# Patient Record
Sex: FEMALE | Race: WHITE | Employment: FULL TIME | URBAN - METROPOLITAN AREA
[De-identification: names, ages, dates, MRNs, and addresses within clinical notes are randomized per-mention and may not be internally consistent; named-entity substitution may affect disease eponyms.]

---

## 2019-09-06 ENCOUNTER — HOSPITAL ENCOUNTER (EMERGENCY)
Age: 34
Discharge: HOME | End: 2019-09-06
Attending: EMERGENCY MEDICINE
Payer: COMMERCIAL

## 2019-09-06 ENCOUNTER — APPOINTMENT (OUTPATIENT)
Dept: GENERAL RADIOLOGY | Age: 34
End: 2019-09-06
Attending: EMERGENCY MEDICINE
Payer: COMMERCIAL

## 2019-09-06 VITALS
HEART RATE: 72 BPM | RESPIRATION RATE: 16 BRPM | HEIGHT: 68 IN | OXYGEN SATURATION: 100 % | DIASTOLIC BLOOD PRESSURE: 58 MMHG | SYSTOLIC BLOOD PRESSURE: 102 MMHG | WEIGHT: 200 LBS | TEMPERATURE: 97.9 F | BODY MASS INDEX: 30.31 KG/M2

## 2019-09-06 DIAGNOSIS — M25.512 PAIN IN JOINT OF LEFT SHOULDER: Primary | ICD-10-CM

## 2019-09-06 PROCEDURE — 73030 X-RAY EXAM OF SHOULDER: CPT

## 2019-09-06 PROCEDURE — 99283 EMERGENCY DEPT VISIT LOW MDM: CPT | Performed by: NURSE PRACTITIONER

## 2019-09-06 PROCEDURE — 74011250637 HC RX REV CODE- 250/637: Performed by: NURSE PRACTITIONER

## 2019-09-06 RX ORDER — TRAMADOL HYDROCHLORIDE 50 MG/1
50 TABLET ORAL
Status: COMPLETED | OUTPATIENT
Start: 2019-09-06 | End: 2019-09-06

## 2019-09-06 RX ORDER — DICLOFENAC SODIUM 50 MG/1
50 TABLET, DELAYED RELEASE ORAL 2 TIMES DAILY
Qty: 20 TAB | Refills: 0 | Status: SHIPPED | OUTPATIENT
Start: 2019-09-06 | End: 2019-09-16

## 2019-09-06 RX ORDER — METHOCARBAMOL 750 MG/1
750 TABLET, FILM COATED ORAL 3 TIMES DAILY
Qty: 30 TAB | Refills: 0 | Status: SHIPPED | OUTPATIENT
Start: 2019-09-06 | End: 2019-09-16

## 2019-09-06 RX ORDER — DEXAMETHASONE SODIUM PHOSPHATE 100 MG/10ML
10 INJECTION INTRAMUSCULAR; INTRAVENOUS
Status: COMPLETED | OUTPATIENT
Start: 2019-09-06 | End: 2019-09-06

## 2019-09-06 RX ADMIN — TRAMADOL HYDROCHLORIDE 50 MG: 50 TABLET, FILM COATED ORAL at 12:07

## 2019-09-06 RX ADMIN — DEXAMETHASONE SODIUM PHOSPHATE 10 MG: 10 INJECTION INTRAMUSCULAR; INTRAVENOUS at 12:08

## 2019-09-06 NOTE — ED TRIAGE NOTES
Pt reports L shoulder hurts, states it is hurting her entire arm. Pt states she does a lot of repetitive movement at work and that she feels that is the cause.  Pain with ROM Scheduled tomorrow at 8:30

## 2019-09-06 NOTE — ED PROVIDER NOTES
Patient states left shoulder pain that radiates down her left arm. She states she has numbness in her left fingers. She states pain has been getting progressively worse over the past week. She states today while at work she started having pain with range of motion. She states she works at the [employer]. She denies known injury. She denies neck pain. The history is provided by the patient. No past medical history on file. No past surgical history on file. No family history on file. Social History     Socioeconomic History    Marital status:      Spouse name: Not on file    Number of children: Not on file    Years of education: Not on file    Highest education level: Not on file   Occupational History    Not on file   Social Needs    Financial resource strain: Not on file    Food insecurity:     Worry: Not on file     Inability: Not on file    Transportation needs:     Medical: Not on file     Non-medical: Not on file   Tobacco Use    Smoking status: Not on file   Substance and Sexual Activity    Alcohol use: Not on file    Drug use: Not on file    Sexual activity: Not on file   Lifestyle    Physical activity:     Days per week: Not on file     Minutes per session: Not on file    Stress: Not on file   Relationships    Social connections:     Talks on phone: Not on file     Gets together: Not on file     Attends Advent service: Not on file     Active member of club or organization: Not on file     Attends meetings of clubs or organizations: Not on file     Relationship status: Not on file    Intimate partner violence:     Fear of current or ex partner: Not on file     Emotionally abused: Not on file     Physically abused: Not on file     Forced sexual activity: Not on file   Other Topics Concern    Not on file   Social History Narrative    Not on file         ALLERGIES: Patient has no known allergies.     Review of Systems   Constitutional: Negative for chills and fever.   Cardiovascular: Negative for chest pain. Gastrointestinal: Negative for abdominal pain, nausea and vomiting. Musculoskeletal: Positive for arthralgias. Negative for back pain, joint swelling and neck pain. Neurological: Positive for numbness. Vitals:    09/06/19 1104   BP: 144/88   Pulse: 86   Resp: 16   Temp: 97.9 °F (36.6 °C)   SpO2: 98%   Weight: 90.7 kg (200 lb)   Height: 5' 8\" (1.727 m)            Physical Exam   Constitutional: She is oriented to person, place, and time. No distress. HENT:   Head: Normocephalic and atraumatic. Eyes: Conjunctivae and EOM are normal.   Neck: Normal range of motion. Neck supple. No spinous process tenderness and no muscular tenderness present. Cardiovascular: Normal rate and regular rhythm. Pulmonary/Chest: Effort normal and breath sounds normal.   Musculoskeletal:        Left shoulder: She exhibits decreased range of motion (due to pain), tenderness and pain. Left hand: She exhibits no tenderness, no bony tenderness, normal two-point discrimination and no swelling. Normal sensation noted. Normal strength noted. Neurological: She is alert and oriented to person, place, and time. Skin: Skin is warm and dry. She is not diaphoretic. Psychiatric: She has a normal mood and affect. Her behavior is normal.   Nursing note and vitals reviewed. Xr Shoulder Lt Ap/lat Min 2 V    Result Date: 9/6/2019  Left shoulder Clinical indications: Left shoulder pain for one week FINDINGS: Three views of the left shoulder show no fracture or dislocation. The joint spaces are well-maintained. The soft tissues are unremarkable. IMPRESSION: Negative left shoulder    MDM  Number of Diagnoses or Management Options  Pain in joint of left shoulder:   Diagnosis management comments: Xray negative for acute abnormalities.         Amount and/or Complexity of Data Reviewed  Tests in the radiology section of CPT®: ordered and reviewed  Tests in the medicine section of CPT®: ordered    Risk of Complications, Morbidity, and/or Mortality  Diagnostic procedures: low  Management options: low    Patient Progress  Patient progress: stable         Procedures

## 2019-09-06 NOTE — LETTER
[facility] EMERGENCY DEPT 
[address] [facility] 
[phone] Work/School Note Date: 9/6/2019 To Whom It May concern: 
 
Rojelio Zarco was seen and treated today in the emergency room by the following provider(s): 
Attending Provider: Salomón Maher DO 
Nurse Practitioner: SRIRAM Brown. Rojelio Zarco was seen in the emergency department 09/06/2019.  
 
Sincerely, 
 
 
 
 
SRIRAM Cameron

## 2019-09-06 NOTE — DISCHARGE INSTRUCTIONS
Medications as prescribed. Follow up with your primary care provider for a recheck if symptoms fail to improve or worsen. Return to the emergency department as needed.

## 2019-09-06 NOTE — ED NOTES
I have reviewed discharge instructions with the patient. The patient verbalized understanding. Patient left ED via Discharge Method: ambulatory to Home with self. Opportunity for questions and clarification provided. Patient given 2 scripts. To continue your aftercare when you leave the hospital, you may receive an automated call from our care team to check in on how you are doing. This is a free service and part of our promise to provide the best care and service to meet your aftercare needs.  If you have questions, or wish to unsubscribe from this service please call [phone]. Thank you for Choosing our [address] [place] [address] Emergency Department.

## 2022-05-27 ENCOUNTER — OFFICE VISIT (OUTPATIENT)
Dept: FAMILY MEDICINE CLINIC | Facility: CLINIC | Age: 37
End: 2022-05-27
Payer: COMMERCIAL

## 2022-05-27 VITALS
HEART RATE: 100 BPM | RESPIRATION RATE: 19 BRPM | WEIGHT: 211.4 LBS | BODY MASS INDEX: 32.04 KG/M2 | HEIGHT: 68 IN | SYSTOLIC BLOOD PRESSURE: 132 MMHG | OXYGEN SATURATION: 97 % | DIASTOLIC BLOOD PRESSURE: 70 MMHG

## 2022-05-27 DIAGNOSIS — R51.9 SINUS HEADACHE: ICD-10-CM

## 2022-05-27 DIAGNOSIS — J02.0 PHARYNGITIS DUE TO STREPTOCOCCUS SPECIES: ICD-10-CM

## 2022-05-27 DIAGNOSIS — J30.89 OTHER ALLERGIC RHINITIS: ICD-10-CM

## 2022-05-27 DIAGNOSIS — Z00.00 ROUTINE GENERAL MEDICAL EXAMINATION AT A HEALTH CARE FACILITY: Primary | ICD-10-CM

## 2022-05-27 DIAGNOSIS — F41.1 GENERALIZED ANXIETY DISORDER: ICD-10-CM

## 2022-05-27 DIAGNOSIS — G43.009 MIGRAINE WITHOUT AURA AND WITHOUT STATUS MIGRAINOSUS, NOT INTRACTABLE: ICD-10-CM

## 2022-05-27 DIAGNOSIS — E55.9 VITAMIN D DEFICIENCY: ICD-10-CM

## 2022-05-27 DIAGNOSIS — M54.6 CHRONIC MIDLINE THORACIC BACK PAIN: ICD-10-CM

## 2022-05-27 DIAGNOSIS — G89.29 CHRONIC MIDLINE THORACIC BACK PAIN: ICD-10-CM

## 2022-05-27 DIAGNOSIS — J32.9 RECURRENT SINUSITIS: ICD-10-CM

## 2022-05-27 PROBLEM — R07.0 THROAT PAIN: Status: ACTIVE | Noted: 2022-05-27

## 2022-05-27 PROBLEM — M54.9 BACK PAIN: Status: ACTIVE | Noted: 2022-05-27

## 2022-05-27 PROBLEM — J02.9 THROAT INFECTION: Status: ACTIVE | Noted: 2022-05-27

## 2022-05-27 LAB
GROUP A STREP ANTIGEN, POC: POSITIVE
VALID INTERNAL CONTROL, POC: YES

## 2022-05-27 PROCEDURE — 96372 THER/PROPH/DIAG INJ SC/IM: CPT | Performed by: NURSE PRACTITIONER

## 2022-05-27 PROCEDURE — 99214 OFFICE O/P EST MOD 30 MIN: CPT | Performed by: NURSE PRACTITIONER

## 2022-05-27 PROCEDURE — 99395 PREV VISIT EST AGE 18-39: CPT | Performed by: NURSE PRACTITIONER

## 2022-05-27 PROCEDURE — 87880 STREP A ASSAY W/OPTIC: CPT | Performed by: NURSE PRACTITIONER

## 2022-05-27 RX ORDER — TIZANIDINE 4 MG/1
4 TABLET ORAL
Qty: 30 TABLET | Refills: 2 | Status: SHIPPED | OUTPATIENT
Start: 2022-05-27

## 2022-05-27 RX ORDER — CEFDINIR 300 MG/1
300 CAPSULE ORAL 2 TIMES DAILY
Qty: 20 CAPSULE | Refills: 0 | Status: SHIPPED | OUTPATIENT
Start: 2022-05-27 | End: 2022-06-06

## 2022-05-27 RX ORDER — SUMATRIPTAN 50 MG/1
50 TABLET, FILM COATED ORAL
COMMUNITY
End: 2022-05-27

## 2022-05-27 RX ORDER — FAMOTIDINE 40 MG/1
TABLET, FILM COATED ORAL
COMMUNITY
Start: 2022-05-06 | End: 2022-05-27 | Stop reason: DRUGHIGH

## 2022-05-27 RX ORDER — ERGOCALCIFEROL 1.25 MG/1
CAPSULE ORAL
Qty: 5 CAPSULE | Refills: 2 | Status: SHIPPED | OUTPATIENT
Start: 2022-05-27 | End: 2022-08-19 | Stop reason: SDUPTHER

## 2022-05-27 RX ORDER — TRIAMCINOLONE ACETONIDE 40 MG/ML
40 INJECTION, SUSPENSION INTRA-ARTICULAR; INTRAMUSCULAR ONCE
Status: COMPLETED | OUTPATIENT
Start: 2022-05-27 | End: 2022-05-27

## 2022-05-27 RX ORDER — OMEPRAZOLE 40 MG/1
40 CAPSULE, DELAYED RELEASE ORAL DAILY
Qty: 30 CAPSULE | Refills: 5 | Status: SHIPPED | OUTPATIENT
Start: 2022-05-27

## 2022-05-27 RX ORDER — HYDROCODONE BITARTRATE AND ACETAMINOPHEN 5; 325 MG/1; MG/1
1 TABLET ORAL EVERY 6 HOURS PRN
Qty: 10 TABLET | Refills: 0 | Status: SHIPPED | OUTPATIENT
Start: 2022-05-27 | End: 2022-05-30

## 2022-05-27 RX ORDER — PREDNISONE 20 MG/1
TABLET ORAL
Qty: 11 TABLET | Refills: 0 | Status: SHIPPED | OUTPATIENT
Start: 2022-05-27 | End: 2022-06-13 | Stop reason: ALTCHOICE

## 2022-05-27 RX ORDER — KETOROLAC TROMETHAMINE 30 MG/ML
60 INJECTION, SOLUTION INTRAMUSCULAR; INTRAVENOUS ONCE
Status: COMPLETED | OUTPATIENT
Start: 2022-05-27 | End: 2022-05-27

## 2022-05-27 RX ORDER — AZELASTINE 1 MG/ML
SPRAY, METERED NASAL
COMMUNITY
Start: 2022-05-06

## 2022-05-27 RX ORDER — KETOROLAC TROMETHAMINE 10 MG/1
10 TABLET, FILM COATED ORAL EVERY 6 HOURS
COMMUNITY
Start: 2021-11-15 | End: 2022-05-27

## 2022-05-27 RX ORDER — ERGOCALCIFEROL 1.25 MG/1
CAPSULE ORAL
COMMUNITY
Start: 2022-04-03 | End: 2022-05-27 | Stop reason: SDUPTHER

## 2022-05-27 RX ADMIN — TRIAMCINOLONE ACETONIDE 40 MG: 40 INJECTION, SUSPENSION INTRA-ARTICULAR; INTRAMUSCULAR at 14:30

## 2022-05-27 RX ADMIN — KETOROLAC TROMETHAMINE 60 MG: 30 INJECTION, SOLUTION INTRAMUSCULAR; INTRAVENOUS at 14:30

## 2022-05-27 ASSESSMENT — ENCOUNTER SYMPTOMS
BLOOD IN STOOL: 0
ALLERGIC/IMMUNOLOGIC NEGATIVE: 1
SORE THROAT: 1
STRIDOR: 0
WHEEZING: 0
PHOTOPHOBIA: 0
CONSTIPATION: 0
BACK PAIN: 1
NAUSEA: 0
CHOKING: 0
RHINORRHEA: 0
ABDOMINAL DISTENTION: 0
VOMITING: 0
COUGH: 0
EYE ITCHING: 0
VOICE CHANGE: 0
ABDOMINAL PAIN: 0
RECTAL PAIN: 0
EYE PAIN: 0
ANAL BLEEDING: 0
EYE REDNESS: 0
SINUS PAIN: 1
CHEST TIGHTNESS: 0
EYE DISCHARGE: 0
APNEA: 0
SINUS PRESSURE: 1
GASTROINTESTINAL NEGATIVE: 1
TROUBLE SWALLOWING: 0
FACIAL SWELLING: 0
EYES NEGATIVE: 1
COLOR CHANGE: 0
RESPIRATORY NEGATIVE: 1
DIARRHEA: 0
SHORTNESS OF BREATH: 0

## 2022-05-27 NOTE — PROGRESS NOTES
PROGRESS NOTE    Chief Complaint   Patient presents with    Annual Exam     presenting for CPX and to discuss labs, migraines, depression and anxiety. States that since she has started the Prozac she has not had kayli horrible migraines. States that she got strep in october and has been sxic since even with abx. States that she has an appointment with ENT in 5 days. SUBJECTIVE:     Marquis Chaudhary is a very pleasant 30 y.o. female with hx of anxiety, migraine headache, seasonal allergies, vitamin D deficiency, GERD, back pain, and arthritis, seen today in office for her annual medical physical.  Patient continues to have sinus headache, pressure, bilateral ear fullness and pain with right worse than left. Patient also continues to have sore throat, painful swallowing. Patient has been treated for acute pharyngitis with strep coverage and has failed course of Keflex, Z-Fabiano, 2 courses of oral prednisone. Patient has been taking her Astelin and Zyrtec daily in addition to famotidine at bedtime for postnasal drip coverage in addition to reflux as cause. Patient has also been referred to ENT and has an appointment on 5/31 to establish care. Today, patient reports very painful swallowing with right ear pain in addition to having difficulty in sleeping the last couple nights due to discomfort. She reports no current fever or chills, no chest pain, no shortness of breath no palpitation, no unilateral focal weakness, no nausea, no vomiting, no diarrhea, no rash. Patient also has history of thoracic and lumbar back pain and arthritis. She has had more tightness and discomfort to her upper back recently. No recent trauma or falls or injuries. Past Medical History, Past Surgical History, Family history, Social History, and Medications were all reviewed with the patient today and updated as necessary.        Current Outpatient Medications   Medication Sig Dispense Refill    azelastine (ASTELIN) 0.1 % nasal spray 1 SPRAY BY BOTH NOSTRILS ROUTE TWO (2) TIMES A DAY. USE IN EACH NOSTRIL AS DIRECTED      vitamin D (ERGOCALCIFEROL) 1.25 MG (95067 UT) CAPS capsule TAKE 1 CAPSULE BY MOUTH EVERY SEVEN (7) DAYS. WITH FOOD 5 capsule 2    omeprazole (PRILOSEC) 40 MG delayed release capsule Take 1 capsule by mouth daily At bedtime for stomach/reflux 30 capsule 5    cefdinir (OMNICEF) 300 MG capsule Take 1 capsule by mouth 2 times daily for 10 days With food (antibiotic) 20 capsule 0    tiZANidine (ZANAFLEX) 4 MG tablet Take 1 tablet by mouth nightly as needed (Muscle Spasm) May cause drowsiness 30 tablet 2    predniSONE (DELTASONE) 20 MG tablet Take 2 tablets with food by mouth every morning for 4 days, then 1 tablet with food by mouth every morning for 3 days then stop. (start 5/28/22) 11 tablet 0    HYDROcodone-acetaminophen (NORCO) 5-325 MG per tablet Take 1 tablet by mouth every 6 hours as needed for Pain for up to 3 days. Intended supply: 3 days. Take lowest dose possible to manage pain 10 tablet 0    FLUoxetine (PROZAC) 10 MG capsule Take 10 mg by mouth daily      Norgestim-Eth Estrad Triphasic 0.18/0.215/0.25 MG-25 MCG TABS 1 tablet po qday per package instructions      Rimegepant Sulfate (NURTEC) 75 MG TBDP Take 75 mg by mouth daily as needed       No current facility-administered medications for this visit. Allergies   Allergen Reactions    Sertraline Anxiety     Worsening symptoms with jaw clenching     Patient Active Problem List   Diagnosis    Back pain    Throat pain    Throat infection     History reviewed. No pertinent past medical history.   Past Surgical History:   Procedure Laterality Date    CHOLECYSTECTOMY      GYN       Family History   Problem Relation Age of Onset    No Known Problems Mother     No Known Problems Father      Social History     Tobacco Use    Smoking status: Former Smoker    Smokeless tobacco: Never Used   Substance Use Topics    Alcohol use: Yes         REVIEW OF SYSTEM    Review of Systems   Constitutional: Negative. Negative for activity change, appetite change, chills, diaphoresis, fatigue, fever and unexpected weight change. HENT: Positive for congestion, ear pain, sinus pressure, sinus pain and sore throat. Negative for dental problem, drooling, ear discharge, facial swelling, hearing loss, mouth sores, nosebleeds, postnasal drip, rhinorrhea, sneezing, tinnitus, trouble swallowing and voice change. Eyes: Negative. Negative for photophobia, pain, discharge, redness, itching and visual disturbance. Respiratory: Negative. Negative for apnea, cough, choking, chest tightness, shortness of breath, wheezing and stridor. Cardiovascular: Negative. Negative for chest pain, palpitations and leg swelling. Gastrointestinal: Negative. Negative for abdominal distention, abdominal pain, anal bleeding, blood in stool, constipation, diarrhea, nausea, rectal pain and vomiting. Endocrine: Negative. Negative for cold intolerance, heat intolerance, polydipsia, polyphagia and polyuria. Genitourinary: Negative. Negative for decreased urine volume, difficulty urinating, dysuria, enuresis, flank pain, frequency, genital sores, hematuria and urgency. Musculoskeletal: Positive for arthralgias, back pain, myalgias and neck pain. Negative for gait problem, joint swelling and neck stiffness. Skin: Negative. Negative for color change, pallor, rash and wound. Allergic/Immunologic: Negative. Negative for environmental allergies, food allergies and immunocompromised state. Neurological: Positive for headaches (hx of migraines). Negative for dizziness, tremors, seizures, syncope, facial asymmetry, speech difficulty, weakness, light-headedness and numbness. Hematological: Negative. Negative for adenopathy. Does not bruise/bleed easily. Psychiatric/Behavioral: Positive for sleep disturbance.  Negative for agitation, behavioral problems, confusion, decreased concentration, dysphoric mood, hallucinations, self-injury and suicidal ideas. The patient is nervous/anxious (controlled). The patient is not hyperactive. OBJECTIVE:  /70 (Site: Left Upper Arm, Position: Sitting, Cuff Size: Medium Adult)   Pulse 100   Resp 19   Ht 5' 8\" (1.727 m)   Wt 211 lb 6.4 oz (95.9 kg)   SpO2 97%   BMI 32.14 kg/m²      Physical Exam  Constitutional:       General: She is not in acute distress. Appearance: Normal appearance. She is not ill-appearing, toxic-appearing or diaphoretic. HENT:      Head: Normocephalic and atraumatic. Right Ear: Ear canal and external ear normal. There is no impacted cerumen. Left Ear: Ear canal and external ear normal. There is no impacted cerumen. Ears:      Comments: Bilateral TM dull with effusion. No erythema, no rupture, no active drainage or discharge     Nose: No congestion or rhinorrhea. Comments: Positive for deviated septum     Mouth/Throat:      Mouth: Mucous membranes are moist.      Pharynx: Oropharynx is clear. Posterior oropharyngeal erythema present. Comments: Right tonsillar hypertrophy grade 2-3. No exudate noted  Eyes:      Extraocular Movements: Extraocular movements intact. Conjunctiva/sclera: Conjunctivae normal.      Pupils: Pupils are equal, round, and reactive to light. Neck:      Vascular: No carotid bruit. Cardiovascular:      Rate and Rhythm: Normal rate and regular rhythm. Pulses: Normal pulses. Heart sounds: Normal heart sounds. Pulmonary:      Effort: Pulmonary effort is normal. No respiratory distress. Breath sounds: Normal breath sounds. No stridor. No wheezing, rhonchi or rales. Chest:      Chest wall: No tenderness. Abdominal:      General: Abdomen is flat. Bowel sounds are normal. There is no distension. Palpations: Abdomen is soft. There is no shifting dullness, fluid wave, hepatomegaly, splenomegaly, mass or pulsatile mass.       Tenderness: There is no abdominal tenderness. There is no right CVA tenderness, left CVA tenderness, guarding or rebound. Negative signs include Silva's sign, Rovsing's sign, McBurney's sign, psoas sign and obturator sign. Hernia: No hernia is present. Musculoskeletal:         General: Tenderness (Left upper scapular region with tightness and spasm) present. Normal range of motion. Cervical back: Normal range of motion and neck supple. Tenderness present. No rigidity. Lymphadenopathy:      Cervical: Cervical adenopathy present. Skin:     General: Skin is warm and dry. Capillary Refill: Capillary refill takes less than 2 seconds. Neurological:      General: No focal deficit present. Mental Status: She is alert and oriented to person, place, and time. Psychiatric:         Mood and Affect: Mood normal.         Behavior: Behavior normal.         Thought Content: Thought content normal.         Judgment: Judgment normal.           Medical problems and test results were reviewed with the patient today. Lab Results   Component Value Date     05/19/2022    K 4.3 05/19/2022     05/19/2022    CO2 23 05/19/2022    BUN 9 05/19/2022    CREATININE 0.96 05/19/2022    GLUCOSE 74 05/19/2022    CALCIUM 9.7 05/19/2022    PROT 7.1 05/19/2022    LABALBU 4.3 05/19/2022    BILITOT 0.2 05/19/2022    ALKPHOS 67 05/19/2022    AST 12 05/19/2022    ALT 18 05/19/2022    GFRAA 98 10/27/2021    AGRATIO 1.5 05/19/2022       Lab Results   Component Value Date    TSH 2.160 05/19/2022               ASSESSMENT and PLAN    1. Routine general medical examination at a health care facility   Anticipatory guidance for age-seatbelts, avoid cell phone use in car (may use hands free), no texting while driving, avoid social drugs and tobacco, limit alcohol, fall safety, personal safety with appropriate use of helmets and equipment for protection, and appropriate use of sun screen and sun protection to prevent skin cancer. Encourage healthy diet and exercise daily. 2. Pharyngitis due to Streptococcus species  -     AMB POC RAPID STREP A  -     Strep A culture, throat; Future    Patient with reoccurring strep. Patient completed course of Keflex and prednisone last month with improvement while on antibiotics but symptoms returned as soon as medication are completed. Patient also completed a course of Z-Fabiano a couple weeks prior and low-dose prednisone with minimal improvement. Today her posterior pharynx showed right tonsillar hypertrophy with erythema and inflammation. Cervical adenopathy enlargement and tenderness noted. POC strep is positive. Throat culture obtained and sent for sensitivity. Will have patient start cefdinir twice daily for 10 days. Reviewed risk and side effects of medication including GI upset and increased risk for opportunistic infection such as yeast and C. difficile. Patient was advised to take medication with food and to increase probiotic supplementation (via yogurt, cottage cheese, cultured foods/drinks or OTC probiotic supplements) to avoid any opportunistic infection. Due to reoccurring strep and recurrent sinusitis/headache/deviated septum, referral has been placed to ENT and patient has an appointment on 5/21 to establish care. 3. Recurrent sinusitis  -     cefdinir (OMNICEF) 300 MG capsule; Take 1 capsule by mouth 2 times daily for 10 days With food (antibiotic), Disp-20 capsule, R-0Normal  -     predniSONE (DELTASONE) 20 MG tablet; Take 2 tablets with food by mouth every morning for 4 days, then 1 tablet with food by mouth every morning for 3 days then stop. (start 5/28/22), Disp-11 tablet, R-0Normal  -     CT SINUS W WO CONTRAST; Future  -     CT FACIAL BONES W CONTRAST; Future    As above. Due to level of discomfort and pain, administered Kenalog 40 mg IM in office along with Toradol 60 mg. We will have patient start prednisone course orally tomorrow.   Reviewed risks and side effects of medication including increased GI upset, increased risks for cardiac disease, flushing, hyperactivity, irritability, agitation, insomnia. Patient was advised to take medication with food and to take early in the day. Pt was also advised to stay hydrated. CT of sinus/facial bones ordered for evaluation. Patient to keep her appointment with ENT on 5/31 to establish care and further work-up. 4. Sinus headache  -     ketorolac (TORADOL) injection 60 mg; 60 mg, IntraMUSCular, ONCE, 1 dose, On Fri 5/27/22 at 1515Do not administer for more than 5 days  -     predniSONE (DELTASONE) 20 MG tablet; Take 2 tablets with food by mouth every morning for 4 days, then 1 tablet with food by mouth every morning for 3 days then stop. (start 5/28/22), Disp-11 tablet, R-0Normal  -     CT SINUS W WO CONTRAST; Future  -     CT FACIAL BONES W CONTRAST; Future    As above. 5. Other allergic rhinitis   As above. Patient was advised to continue with Astelin but add in Flonase with 1 spray from each nasal spray per nostril once in the morning and once at bedtime. Continue with Zyrtec. We will continue to hold Singulair at this time due to her history of anxiety. 6. Chronic midline thoracic back pain  -     ketorolac (TORADOL) injection 60 mg; 60 mg, IntraMUSCular, ONCE, 1 dose, On Fri 5/27/22 at 1515Do not administer for more than 5 days  -     tiZANidine (ZANAFLEX) 4 MG tablet; Take 1 tablet by mouth nightly as needed (Muscle Spasm) May cause drowsiness, Disp-30 tablet, R-2Normal  -     predniSONE (DELTASONE) 20 MG tablet; Take 2 tablets with food by mouth every morning for 4 days, then 1 tablet with food by mouth every morning for 3 days then stop. (start 5/28/22), Disp-11 tablet, R-0Normal  -     HYDROcodone-acetaminophen (NORCO) 5-325 MG per tablet; Take 1 tablet by mouth every 6 hours as needed for Pain for up to 3 days. Intended supply: 3 days.  Take lowest dose possible to manage pain, Disp-10 tablet, R-0Normal    Due to level of pain and discomfort to her upper back, throat, administer Kenalog 40 mg IM and Toradol 60 mg IM today in office for relief. Patient can continue with Tylenol as needed OTC for inflammation and pain. Advised patient to avoid taking ibuprofen and other oral NSAID when taking prednisone due to increased risk of GI bleed. Norco as needed for severe pain. Review risk and side effects of medication including drowsiness, dizziness, increased risk of constipation, increased risk of injury. Patient was advised not to take medication and drive or operate machinery due to increased risk of injury. Patient was also advised to exercise caution when getting up after taking medication to avoid falls. Patient was also advised to not take Norco concurrently with tizanidine due to similar sedation risk and increased risk of overdose/injuries. 7. Vitamin D deficiency  -     vitamin D (ERGOCALCIFEROL) 1.25 MG (61307 UT) CAPS capsule; TAKE 1 CAPSULE BY MOUTH EVERY SEVEN (7) DAYS. WITH FOOD, Disp-5 capsule, R-2Normal    Vitamin D level low. We will have patient restart 50,000 international units of ergocalciferol once weekly with food. We will recheck level in 3 months. 8. Migraine without aura and without status migrainosus, not intractable   Stable. Continue current therapy. 9. Generalized anxiety disorder   Will have patient continue with Prozac 10 mg once daily at this time. We will follow-up in 2 to 3 weeks. If no improvement, we will increase dose.       Orders Placed This Encounter   Procedures    Strep A culture, throat     Standing Status:   Future     Standing Expiration Date:   5/27/2023    CT SINUS W WO CONTRAST     Standing Status:   Future     Standing Expiration Date:   5/27/2023     Order Specific Question:   STAT Creatinine as needed:     Answer:   No     Order Specific Question:   Reason for exam:     Answer:   recurrent sinusitis, headache,    CT FACIAL BONES W CONTRAST     Standing Status:   Future     Standing Expiration Date:   5/27/2023     Order Specific Question:   STAT Creatinine as needed:     Answer:   No    AMB POC RAPID STREP A         Elements of this note have been dictated using speech recognition software. As a result, errors of speech recognition may have occurred. On this date 5/27/2022 I have spent 40 minutes reviewing previous notes, test results and face to face with the patient discussing the diagnosis and importance of compliance with the treatment plan as well as documenting on the day of the visit. Greater than 50% of this visit was spent counseling the patient about test results, prognosis, importance of compliance, education about disease process, benefits of medications, instructions for management of acute symptoms, and follow up plans.          Brenda Nava, APRN - CNP

## 2022-05-29 LAB
BACTERIA SPEC CULT: ABNORMAL
SERVICE CMNT-IMP: ABNORMAL

## 2022-06-02 ENCOUNTER — HOSPITAL ENCOUNTER (OUTPATIENT)
Dept: CT IMAGING | Age: 37
Discharge: HOME | End: 2022-06-05
Payer: COMMERCIAL

## 2022-06-02 DIAGNOSIS — R51.9 SINUS HEADACHE: ICD-10-CM

## 2022-06-02 DIAGNOSIS — J32.9 RECURRENT SINUSITIS: ICD-10-CM

## 2022-06-02 PROCEDURE — 70486 CT MAXILLOFACIAL W/O DYE: CPT

## 2022-06-13 ENCOUNTER — TELEMEDICINE (OUTPATIENT)
Dept: FAMILY MEDICINE CLINIC | Facility: CLINIC | Age: 37
End: 2022-06-13
Payer: COMMERCIAL

## 2022-06-13 DIAGNOSIS — R09.81 NASAL CONGESTION: ICD-10-CM

## 2022-06-13 DIAGNOSIS — J02.9 SORE THROAT: ICD-10-CM

## 2022-06-13 DIAGNOSIS — J02.0 ACUTE STREPTOCOCCAL PHARYNGITIS: ICD-10-CM

## 2022-06-13 DIAGNOSIS — J01.01 ACUTE RECURRENT MAXILLARY SINUSITIS: Primary | ICD-10-CM

## 2022-06-13 DIAGNOSIS — R09.82 POST-NASAL DRIP: ICD-10-CM

## 2022-06-13 DIAGNOSIS — J34.89 SINUS PRESSURE: ICD-10-CM

## 2022-06-13 PROCEDURE — 99213 OFFICE O/P EST LOW 20 MIN: CPT | Performed by: NURSE PRACTITIONER

## 2022-06-13 RX ORDER — AMOXICILLIN AND CLAVULANATE POTASSIUM 875; 125 MG/1; MG/1
1 TABLET, FILM COATED ORAL 2 TIMES DAILY
Qty: 20 TABLET | Refills: 0 | Status: SHIPPED | OUTPATIENT
Start: 2022-06-13 | End: 2022-06-23

## 2022-06-13 ASSESSMENT — ENCOUNTER SYMPTOMS
EYE PAIN: 0
RHINORRHEA: 1
TROUBLE SWALLOWING: 0
RECTAL PAIN: 0
DIARRHEA: 0
RESPIRATORY NEGATIVE: 1
ABDOMINAL DISTENTION: 0
ANAL BLEEDING: 0
BACK PAIN: 0
CHEST TIGHTNESS: 0
SORE THROAT: 1
EYES NEGATIVE: 1
GASTROINTESTINAL NEGATIVE: 1
VOICE CHANGE: 0
SHORTNESS OF BREATH: 0
STRIDOR: 0
ABDOMINAL PAIN: 0
EYE DISCHARGE: 0
VOMITING: 0
SINUS PRESSURE: 1
BLOOD IN STOOL: 0
WHEEZING: 0
FACIAL SWELLING: 0
NAUSEA: 0
ALLERGIC/IMMUNOLOGIC NEGATIVE: 1
COUGH: 0
CONSTIPATION: 0
SINUS PAIN: 1

## 2022-06-13 NOTE — PROGRESS NOTES
[address] [facility] 109 [address] [facility]  Phone: [phone] Fax [phone]  Bright Friedman MS, APRN, FNP-C  6/13/2022    Louie Stevens is a 30 y.o. female who was seen by synchronous (real-time) audio-video technology on 6/13/2022 for Pharyngitis (Pt was seen by her PCP-Dixie Ramirez NP on 5/27/22 for acute strep pharyngitis and acute recurrent maxillary sinusitis. Pt was treated with Cefdinir 300 mg po bid for 10 days, Prednisone taper, and was continued on her Astelin, Flonase, and Zyrtec. Pt reports improving some, but has had a continued sore throat and reports continued nasal congestion with pale yellow rhinorrhea, continued bilat maxillary sinus pressure and pain, and continued post nasal drip. ) and Sinusitis (Pt denies any fever, chills, body aches, malaise, SOB, wheezing, CP, cough, N/V/D/abd pain associated. Pt had CT of her sinuses 6/2/22-result posted in lab section of note. Pt states that she sees her ENT this Thursday, 6/16/22. )        Assessment & Plan:     1. Acute recurrent maxillary sinusitis  2. Acute streptococcal pharyngitis  Pt was seen by her PCP-Dixie Ramirez NP on 5/27/22 for acute strep pharyngitis and acute recurrent maxillary sinusitis. Pt was treated with Cefdinir 300 mg po bid for 10 days, Prednisone taper, and was continued on her Astelin, Flonase, and Zyrtec. Pt reports improving some, but has had a continued sore throat and reports continued nasal congestion with pale yellow rhinorrhea, continued bilat maxillary sinus pressure and pain, and continued post nasal drip. Pt denies any fever, chills, body aches, malaise, SOB, wheezing, CP, cough, N/V/D/abd pain associated. Pt had CT of her sinuses 6/2/22-result posted in lab section of note. Pt states that she sees her ENT this Thursday, 6/16/22. Discussed with pt. Pt improved some, but have some persistent symptoms of sore throat/sinusitis. Will cover with additional abx. Checked allergies.  Will cover with Augmentin 875 mg po bid with food for 10 days. Will treat other symptoms as below. Pt encouraged to keep appointment with her ENT Thursday 6/16/22 as scheduled. ENT can decide if pt should continue for the full 10 days of additional Augmentin based on her physical exam findings in office. Pt to f/u with her PCP-Dixie Fonseca NP in 4 weeks to discuss findings of ENT. Agrees to call sooner for concerns/new or worsening symptoms. Agrees to go to ER for severe symptoms as discussed. - amoxicillin-clavulanate (AUGMENTIN) 875-125 MG per tablet; Take 1 tablet by mouth 2 times daily for 10 days  Dispense: 20 tablet; Refill: 0    3. Nasal congestion  4. Sinus pressure  5. Post-nasal drip  6. Sore throat  Will have pt continue her Astelin, Flonase, and Zyrtec. Recommend warm salt water gargles as well. Return in about 4 weeks (around 7/11/2022) for With Naty Brady to discuss findings of ENT. Call sooner for concerns. ER for severe symptoms. Raymundo Reis [phone]  Subjective:       Prior to Admission medications    Medication Sig Start Date End Date Taking? Authorizing Provider   amoxicillin-clavulanate (AUGMENTIN) 875-125 MG per tablet Take 1 tablet by mouth 2 times daily for 10 days 6/13/22 6/23/22 Yes TIARA Weller - NP   azelastine (ASTELIN) 0.1 % nasal spray 1 SPRAY BY BOTH NOSTRILS ROUTE TWO (2) TIMES A DAY. USE IN EACH NOSTRIL AS DIRECTED 5/6/22  Yes Historical Provider, MD   vitamin D (ERGOCALCIFEROL) 1.25 MG (38806 UT) CAPS capsule TAKE 1 CAPSULE BY MOUTH EVERY SEVEN (7) DAYS.  WITH FOOD 5/27/22  Yes TIARA Cisneros CNP   omeprazole (PRILOSEC) 40 MG delayed release capsule Take 1 capsule by mouth daily At bedtime for stomach/reflux 5/27/22  Yes TIARA Cisneros CNP   tiZANidine (ZANAFLEX) 4 MG tablet Take 1 tablet by mouth nightly as needed (Muscle Spasm) May cause drowsiness 5/27/22  Yes TIARA Sheppard CNP   FLUoxetine (PROZAC) 10 MG capsule Take 10 mg by mouth daily 4/25/22  Yes Ar Automatic Reconciliation   Norgestim-Eth Estrad Triphasic 0.18/0.215/0.25 MG-25 MCG TABS 1 tablet po qday per package instructions 9/11/19  Yes Ar Automatic Reconciliation   Rimegepant Sulfate (NURTEC) 75 MG TBDP Take 75 mg by mouth daily as needed 3/23/22  Yes Ar Automatic Reconciliation       Allergies   Allergen Reactions    Sertraline Anxiety     Worsening symptoms with jaw clenching   , History reviewed. No pertinent past medical history. ,   Past Surgical History:   Procedure Laterality Date    CHOLECYSTECTOMY      GYN     ,   Social History     Tobacco Use    Smoking status: Former Smoker    Smokeless tobacco: Never Used   Substance Use Topics    Alcohol use: Yes    Drug use: Never   ,   Family History   Problem Relation Age of Onset    No Known Problems Mother     No Known Problems Father          Review of Systems   Constitutional: Negative. Negative for appetite change, chills, diaphoresis, fatigue, fever and unexpected weight change. HENT: Positive for congestion (with pale yellow d/c), postnasal drip, rhinorrhea (pale yellow), sinus pressure (bilat maxillary sinus pressure/pain), sinus pain (bilat maxillary sinus pressure/pain) and sore throat. Negative for ear discharge, ear pain, facial swelling, hearing loss, mouth sores, nosebleeds, sneezing, tinnitus, trouble swallowing and voice change. Eyes: Negative. Negative for pain, discharge and visual disturbance. Respiratory: Negative. Negative for cough, chest tightness, shortness of breath, wheezing and stridor. Cardiovascular: Negative. Negative for chest pain, palpitations and leg swelling. Gastrointestinal: Negative. Negative for abdominal distention, abdominal pain, anal bleeding, blood in stool, constipation, diarrhea, nausea, rectal pain and vomiting. Endocrine: Negative. Genitourinary: Negative.   Negative for decreased urine volume, difficulty urinating, dyspareunia, dysuria, flank pain, frequency, genital sores, hematuria, menstrual problem, pelvic pain, urgency, vaginal bleeding, vaginal discharge and vaginal pain. Musculoskeletal: Negative. Negative for arthralgias, back pain, gait problem, joint swelling, myalgias, neck pain and neck stiffness. Skin: Negative. Negative for pallor and rash. Allergic/Immunologic: Negative. Negative for environmental allergies. Neurological: Negative. Negative for dizziness, tremors, syncope, weakness, light-headedness, numbness and headaches. Hematological: Negative. Negative for adenopathy. Does not bruise/bleed easily. Physical Exam  Constitutional:       General: She is not in acute distress. Appearance: Normal appearance. She is not ill-appearing, toxic-appearing or diaphoretic. HENT:      Head: Normocephalic and atraumatic. Pulmonary:      Effort: Pulmonary effort is normal. No respiratory distress. Comments: No audible wheezing  Neurological:      Mental Status: She is alert and oriented to person, place, and time. Psychiatric:         Mood and Affect: Mood normal.         Behavior: Behavior normal.         Thought Content:  Thought content normal.         Judgment: Judgment normal.       Component      Latest Ref Rng & Units 5/27/2022           7:35 PM   Special Requests       NO SPECIAL REQUESTS   Culture       HEAVY Streptococcus pyogenes sero group A (A)     Component      Latest Ref Rng & Units 5/27/2022           2:04 PM   VALID INTERNAL CONTROL, POC       yes   Group A Strep Antigen, POC      Negative Positive     Component      Latest Ref Rng & Units 5/19/2022          11:03 AM   GLUCOSE, FASTING,GF      65 - 99 mg/dL 74   BUN,BUNPL      6 - 20 mg/dL 9   Creatinine      0.57 - 1.00 mg/dL 0.96   EGFR      >59 mL/min/1.73 79   Bun/Cre Ratio      9 - 23 NA 9   Sodium      134 - 144 mmol/L 139   Potassium      3.5 - 5.2 mmol/L 4.3   Chloride      96 - 106 mmol/L 100   CO2      20 - 29 mmol/L 23   CALCIUM, SERUM, 439662      8.7 - 10.2 mg/dL 9. 7   Total Protein      6.0 - 8.5 g/dL 7.1   Albumin      3.8 - 4.8 g/dL 4.3   Globulin, Total      1.5 - 4.5 g/dL 2.8   Albumin/Globulin Ratio      1.2 - 2.2 NA 1.5   Bilirubin      0.0 - 1.2 mg/dL 0.2   Alk Phos      44 - 121 IU/L 67   AST      0 - 40 IU/L 12   ALT      0 - 32 IU/L 18     22  Narrative   CT PARANASAL SINUSES WITHOUT CONTRAST.       INDICATION: Chronic sinusitis, more on the right, worsening.       TECHNIQUE: Axial 2.5 mm scans with coronal reconstructions.  Radiation dose   reduction techniques were used for this study.  Our CT scanners use one or more   of the following:  Automated exposure control, adjustment of the mA and or kV   according to patient size, iterative reconstruction.       COMPARISON: None.       FINDINGS:    FRONTAL SINUSES: Small and clear without mucosal thickening or fluid.         ETHMOID SINUSES: Clear both anteriorly and posteriorly.         SPHENOID SINUSES: Also clear without mucosal thickening or fluid.         MAXILLARY SINUSES / OSTEOMEATAL UNITS:   RIGHT: No mucosal thickening or fluid. Patent infundibulum and thin uncinate   process. LEFT:  Mucous retention cyst posterior floor. Patent infundibulum and thin   uncinate process. No free fluid.       TURBINATES: Small left muna bullosa.         NASAL SEPTUM: Deviated and a leftward transverse nasal septal spur.       MASTOID air cells: Included portion clear.       Included intracranial contents: Unremarkable.  Globes and orbits: Also   unremarkable.           Impression   Mucous retention cyst left maxillary sinus. No free fluid. Nasal   septal deviation. Small muna bullosa on the left. Zak Seay (: 1985) is a 30 y.o. female, Established patient patient, seen via A/V VV for evaluation of the following chief complaint(s):  Pharyngitis (Pt was seen by her PCP-Kim Candance Long, NP on 22 for acute strep pharyngitis and acute recurrent maxillary sinusitis.  Pt was treated with Cefdinir 300 mg po bid for 10 days, Prednisone taper, and was continued on her Astelin, Flonase, and Zyrtec. Pt reports improving some, but has had a continued sore throat and reports continued nasal congestion with pale yellow rhinorrhea, continued bilat maxillary sinus pressure and pain, and continued post nasal drip. ) and Sinusitis (Pt denies any fever, chills, body aches, malaise, SOB, wheezing, CP, cough, N/V/D/abd pain associated. Pt had CT of her sinuses 6/2/22-result posted in lab section of note. Pt states that she sees her ENT this Thursday, 6/16/22. ) LMP 4 weeks ago per pt. Denies chance of pregnancy. Reports menses due any day. We discussed the expected course, resolution and complications of the diagnosis(es) in detail. Medication risks, benefits, costs, interactions, and alternatives were discussed as indicated. I advised her to contact the office if her condition worsens, changes or fails to improve as anticipated. She expressed understanding with the diagnosis(es) and plan. Silvio Favre, was evaluated through a synchronous (real-time) audio-video encounter. The patient (or guardian if applicable) is aware that this is a billable service. Verbal consent to proceed has been obtained within the past 12 months. The visit was conducted pursuant to the emergency declaration under the [facility], [address] [place] [address] authority and the [employer] General Act. Patient identification was verified, and a caregiver was present when appropriate. The patient was located in a state where the provider was credentialed to provide care. Silvio Favre, was evaluated through a synchronous (real-time) audio-video encounter. The patient (or guardian if applicable) is aware that this is a billable service, which includes applicable co-pays. This Virtual Visit was conducted with patient's (and/or legal guardian's) consent.  The visit was conducted pursuant to the emergency declaration under the [facility], [facility] authority and the [employer] General Act. Patient identification was verified, and a caregiver was present when appropriate. The patient was located at Other: work. Provider was located at [facility] ([address] [place], [place] [address]t): [address] Pankaj Galvan [address],  [address]    TIARA Polo NP

## 2022-06-13 NOTE — PATIENT INSTRUCTIONS
Patient Education      Patient Education        Sore Throat: Care Instructions  Overview     Infection by bacteria or a virus causes most sore throats. Cigarette smoke, dry air, air pollution, allergies, and yelling can also cause a sore throat. Sore throats can be painful and annoying. Fortunately, most sore throats go away on their own. If you have a bacterial infection, your doctor may prescribeantibiotics. Follow-up care is a key part of your treatment and safety. Be sure to make and go to all appointments, and call your doctor if you are having problems. It's also a good idea to know your test results and keep alist of the medicines you take. How can you care for yourself at home?  If your doctor prescribed antibiotics, take them as directed. Do not stop taking them just because you feel better. You need to take the full course of antibiotics.  Gargle with warm salt water several times a day to help reduce swelling and relieve pain. Mix 1/2 teaspoon of salt in 1 cup of warm water.  Take an over-the-counter pain medicine, such as acetaminophen (Tylenol), ibuprofen (Advil, Motrin), or naproxen (Aleve). Read and follow all instructions on the label.  Be careful when taking over-the-counter cold or flu medicines and Tylenol at the same time. Many of these medicines have acetaminophen, which is Tylenol. Read the labels to make sure that you are not taking more than the recommended dose. Too much acetaminophen (Tylenol) can be harmful.  Drink plenty of fluids. Fluids may help soothe an irritated throat. Hot fluids, such as tea or soup, may help decrease throat pain.  Use over-the-counter throat lozenges to soothe pain. Regular cough drops or hard candy may also help. These should not be given to young children because of the risk of choking.  Do not smoke or allow others to smoke around you. If you need help quitting, talk to your doctor about stop-smoking programs and medicines.  These can increase your chances of quitting for good.  Use a vaporizer or humidifier to add moisture to your bedroom. Follow the directions for cleaning the machine. When should you call for help? Call your doctor now or seek immediate medical care if:     You have trouble breathing.      Your sore throat gets much worse on one side.      You have new or worse trouble swallowing.      You have a new or higher fever. Watch closely for changes in your health, and be sure to contact your doctor ifyou do not get better as expected. Where can you learn more? Go to https://Alo7peMeasurefuleb.[employer]. org and sign in to your [employer] account. Enter F440 in the [employer] box to learn more about \"Sore Throat: Care Instructions. \"     If you do not have an account, please click on the \"Sign Up Now\" link. Current as of: September 8, 2021               Content Version: 13.2  © 2006-2022 [employer]. Care instructions adapted under license by [facility] ([facility]). If you have questions about a medical condition or this instruction, always ask your healthcare professional. [place] [address] any warranty or liability for your use of this information. Sinusitis: Care Instructions  Your Care Instructions     Sinusitis is an infection of the lining of the sinus cavities in your head. Sinusitis often follows a cold. It causes pain and pressure in your head andface. In most cases, sinusitis gets better on its own in 1 to 2 weeks. But some mildsymptoms may last for several weeks. Sometimes antibiotics are needed. Follow-up care is a key part of your treatment and safety. Be sure to make and go to all appointments, and call your doctor if you are having problems. It's also a good idea to know your test results and keep alist of the medicines you take. How can you care for yourself at home?    Take an over-the-counter pain medicine, such as acetaminophen (Tylenol), ibuprofen (Advil, Motrin), or naproxen (Aleve). Read and follow all instructions on the label.  If the doctor prescribed antibiotics, take them as directed. Do not stop taking them just because you feel better. You need to take the full course of antibiotics.  Be careful when taking over-the-counter cold or flu medicines and Tylenol at the same time. Many of these medicines have acetaminophen, which is Tylenol. Read the labels to make sure that you are not taking more than the recommended dose. Too much acetaminophen (Tylenol) can be harmful.  Breathe warm, moist air from a steamy shower, a hot bath, or a sink filled with hot water. Avoid cold, dry air. Using a humidifier in your home may help. Follow the directions for cleaning the machine.  Use saline (saltwater) nasal washes. This can help keep your nasal passages open and wash out mucus and bacteria. You can buy saline nose drops at a grocery store or drugstore. Or you can make your own at home by adding 1 teaspoon of salt and 1 teaspoon of baking soda to 2 cups of distilled water. If you make your own, fill a bulb syringe with the solution, insert the tip into your nostril, and squeeze gently. Margueritte Gash your nose.  Put a hot, wet towel or a warm gel pack on your face 3 or 4 times a day for 5 to 10 minutes each time.  Try a decongestant nasal spray like oxymetazoline (Afrin). Do not use it for more than 3 days in a row. Using it for more than 3 days can make your congestion worse. When should you call for help? Call your doctor now or seek immediate medical care if:     You have new or worse swelling or redness in your face or around your eyes.      You have a new or higher fever. Watch closely for changes in your health, and be sure to contact your doctor if:     You have new or worse facial pain.      The mucus from your nose becomes thicker (like pus) or has new blood in it.      You are not getting better as expected. Where can you learn more?   Go to https://chpepiceweb.health[employer]. org and sign in to your [employer] account. Enter U475 in the [facility] box to learn more about \"Sinusitis: Care Instructions. \"     If you do not have an account, please click on the \"Sign Up Now\" link. Current as of: September 8, 2021               Content Version: 13.2  © 0332-4633 [employer], [facility]. Care instructions adapted under license by [facility] ([facility]). If you have questions about a medical condition or this instruction, always ask your healthcare professional. [place] [address] any warranty or liability for your use of this information.

## 2022-07-13 RX ORDER — DROSPIRENONE 4 MG/1
1 TABLET, FILM COATED ORAL EVERY MORNING
COMMUNITY
Start: 2022-06-20

## 2022-07-15 ENCOUNTER — OFFICE VISIT (OUTPATIENT)
Dept: FAMILY MEDICINE CLINIC | Facility: CLINIC | Age: 37
End: 2022-07-15
Payer: COMMERCIAL

## 2022-07-15 VITALS
SYSTOLIC BLOOD PRESSURE: 118 MMHG | WEIGHT: 213 LBS | HEIGHT: 68 IN | OXYGEN SATURATION: 98 % | BODY MASS INDEX: 32.28 KG/M2 | DIASTOLIC BLOOD PRESSURE: 72 MMHG | HEART RATE: 82 BPM

## 2022-07-15 DIAGNOSIS — J32.9 RECURRENT SINUSITIS: ICD-10-CM

## 2022-07-15 DIAGNOSIS — F41.1 GENERALIZED ANXIETY DISORDER: ICD-10-CM

## 2022-07-15 DIAGNOSIS — R09.82 POST-NASAL DRIP: ICD-10-CM

## 2022-07-15 DIAGNOSIS — J34.89 SINUS PRESSURE: ICD-10-CM

## 2022-07-15 DIAGNOSIS — J02.9 SORE THROAT: Primary | ICD-10-CM

## 2022-07-15 LAB
GROUP A STREP ANTIGEN, POC: NEGATIVE
VALID INTERNAL CONTROL, POC: YES

## 2022-07-15 PROCEDURE — 87880 STREP A ASSAY W/OPTIC: CPT | Performed by: NURSE PRACTITIONER

## 2022-07-15 PROCEDURE — 99214 OFFICE O/P EST MOD 30 MIN: CPT | Performed by: NURSE PRACTITIONER

## 2022-07-15 RX ORDER — BUPROPION HYDROCHLORIDE 150 MG/1
150 TABLET ORAL EVERY MORNING
Qty: 30 TABLET | Refills: 3 | Status: SHIPPED | OUTPATIENT
Start: 2022-07-15 | End: 2022-08-23 | Stop reason: SDUPTHER

## 2022-07-15 RX ORDER — MONTELUKAST SODIUM 10 MG/1
10 TABLET ORAL NIGHTLY
Qty: 30 TABLET | Refills: 5 | Status: SHIPPED | OUTPATIENT
Start: 2022-07-15 | End: 2022-07-18 | Stop reason: SDUPTHER

## 2022-07-15 ASSESSMENT — PATIENT HEALTH QUESTIONNAIRE - PHQ9
SUM OF ALL RESPONSES TO PHQ QUESTIONS 1-9: 0
2. FEELING DOWN, DEPRESSED OR HOPELESS: 0
SUM OF ALL RESPONSES TO PHQ9 QUESTIONS 1 & 2: 0
1. LITTLE INTEREST OR PLEASURE IN DOING THINGS: 0
SUM OF ALL RESPONSES TO PHQ QUESTIONS 1-9: 0

## 2022-07-17 ENCOUNTER — TELEPHONE (OUTPATIENT)
Dept: FAMILY MEDICINE CLINIC | Facility: CLINIC | Age: 37
End: 2022-07-17

## 2022-07-17 ASSESSMENT — ENCOUNTER SYMPTOMS
CONSTIPATION: 0
RECTAL PAIN: 0
ABDOMINAL PAIN: 0
STRIDOR: 0
ANAL BLEEDING: 0
NAUSEA: 0
VOMITING: 0
BLOOD IN STOOL: 0
COLOR CHANGE: 0
DIARRHEA: 0
WHEEZING: 0
VOICE CHANGE: 0
PHOTOPHOBIA: 0
APNEA: 0
ABDOMINAL DISTENTION: 0
ALLERGIC/IMMUNOLOGIC NEGATIVE: 1
EYE PAIN: 0
SINUS PRESSURE: 0
CHEST TIGHTNESS: 0
SINUS PAIN: 1
EYES NEGATIVE: 1
RHINORRHEA: 0
EYE REDNESS: 0
SHORTNESS OF BREATH: 0
FACIAL SWELLING: 0
SORE THROAT: 0
COUGH: 1
CHOKING: 0
EYE DISCHARGE: 0
GASTROINTESTINAL NEGATIVE: 1
EYE ITCHING: 0
BACK PAIN: 0
TROUBLE SWALLOWING: 0

## 2022-07-17 NOTE — TELEPHONE ENCOUNTER
Please start PA for singulair 10 mg daily. Currenlty taking zyrtec, flonase, and astelin nasal spray. Still has recurrent sinusitis and postnasal drip.

## 2022-07-18 DIAGNOSIS — J34.89 SINUS PRESSURE: ICD-10-CM

## 2022-07-18 DIAGNOSIS — J32.9 RECURRENT SINUSITIS: ICD-10-CM

## 2022-07-18 RX ORDER — MONTELUKAST SODIUM 10 MG/1
10 TABLET ORAL NIGHTLY
Qty: 30 TABLET | Refills: 5 | Status: SHIPPED | OUTPATIENT
Start: 2022-07-18

## 2022-07-18 NOTE — PROGRESS NOTES
Pharmacy states they did not receive this medication and are requesting we resend it.  Resent as prescribed by Larisa Peralta

## 2022-07-21 ENCOUNTER — TELEMEDICINE (OUTPATIENT)
Dept: FAMILY MEDICINE CLINIC | Facility: CLINIC | Age: 37
End: 2022-07-21
Payer: COMMERCIAL

## 2022-07-21 DIAGNOSIS — J02.9 SORE THROAT: ICD-10-CM

## 2022-07-21 DIAGNOSIS — R05.9 COUGH: ICD-10-CM

## 2022-07-21 DIAGNOSIS — J02.0 ACUTE STREPTOCOCCAL PHARYNGITIS: ICD-10-CM

## 2022-07-21 DIAGNOSIS — R09.81 NASAL CONGESTION: ICD-10-CM

## 2022-07-21 DIAGNOSIS — R09.82 POST-NASAL DRIP: ICD-10-CM

## 2022-07-21 DIAGNOSIS — J34.89 SINUS PRESSURE: ICD-10-CM

## 2022-07-21 DIAGNOSIS — J01.00 ACUTE MAXILLARY SINUSITIS, RECURRENCE NOT SPECIFIED: Primary | ICD-10-CM

## 2022-07-21 PROCEDURE — 99213 OFFICE O/P EST LOW 20 MIN: CPT | Performed by: NURSE PRACTITIONER

## 2022-07-21 RX ORDER — BENZONATATE 100 MG/1
100 CAPSULE ORAL 3 TIMES DAILY PRN
Qty: 21 CAPSULE | Refills: 0 | Status: SHIPPED | OUTPATIENT
Start: 2022-07-21 | End: 2022-07-28

## 2022-07-21 RX ORDER — PREDNISONE 20 MG/1
TABLET ORAL
Qty: 11 TABLET | Refills: 0 | Status: SHIPPED | OUTPATIENT
Start: 2022-07-21 | End: 2022-08-23 | Stop reason: ALTCHOICE

## 2022-07-21 RX ORDER — AMOXICILLIN AND CLAVULANATE POTASSIUM 875; 125 MG/1; MG/1
1 TABLET, FILM COATED ORAL 2 TIMES DAILY
Qty: 20 TABLET | Refills: 0 | Status: SHIPPED | OUTPATIENT
Start: 2022-07-21 | End: 2022-07-31

## 2022-07-21 ASSESSMENT — ENCOUNTER SYMPTOMS
CONSTIPATION: 0
STRIDOR: 0
CHOKING: 0
ABDOMINAL DISTENTION: 0
NAUSEA: 0
WHEEZING: 0
RHINORRHEA: 1
APNEA: 0
CHEST TIGHTNESS: 0
VOICE CHANGE: 0
ABDOMINAL PAIN: 0
BACK PAIN: 0
EYE PAIN: 0
RECTAL PAIN: 0
ALLERGIC/IMMUNOLOGIC NEGATIVE: 1
EYES NEGATIVE: 1
SINUS PAIN: 1
VOMITING: 0
EYE DISCHARGE: 0
DIARRHEA: 0
FACIAL SWELLING: 0
TROUBLE SWALLOWING: 0
SORE THROAT: 1
GASTROINTESTINAL NEGATIVE: 1
BLOOD IN STOOL: 0
SINUS PRESSURE: 1
COUGH: 1
SHORTNESS OF BREATH: 0
ANAL BLEEDING: 0

## 2022-07-21 NOTE — PROGRESS NOTES
(DELTASONE) 20 MG tablet; 1 tab po bid with food for four days then 1 tab po daily with food for three days  Dispense: 11 tablet; Refill: 0    7. Cough  Will give PRN Tessalon as directed to help with PND induced dry cough. - benzonatate (TESSALON) 100 MG capsule; Take 1 capsule by mouth 3 times daily as needed for Cough  Dispense: 21 capsule; Refill: 0        Return if symptoms worsen or fail to improve, for Call in a few days if no better or sooner for worsening symptoms. ER for severe symptoms. Subjective:       Prior to Admission medications    Medication Sig Start Date End Date Taking? Authorizing Provider   amoxicillin-clavulanate (AUGMENTIN) 875-125 MG per tablet Take 1 tablet by mouth in the morning and 1 tablet before bedtime. Do all this for 10 days. 7/21/22 7/31/22 Yes TIARA Vigil NP   predniSONE (DELTASONE) 20 MG tablet 1 tab po bid with food for four days then 1 tab po daily with food for three days 7/21/22  Yes TIARA Mancuso NP   benzonatate (TESSALON) 100 MG capsule Take 1 capsule by mouth 3 times daily as needed for Cough 7/21/22 7/28/22 Yes TIARA Vigil NP   montelukast (SINGULAIR) 10 MG tablet Take 1 tablet by mouth nightly For allergies/postnasal drip 7/18/22  Yes TIARA Alvarez CNP   buPROPion (WELLBUTRIN XL) 150 MG extended release tablet Take 1 tablet by mouth every morning With food for anxiety/depression prevention 7/15/22  Yes TIARA Alvarez CNP   SLYND 4 MG TABS  6/20/22  Yes Historical Provider, MD   azelastine (ASTELIN) 0.1 % nasal spray 1 SPRAY BY BOTH NOSTRILS ROUTE TWO (2) TIMES A DAY. USE IN EACH NOSTRIL AS DIRECTED 5/6/22  Yes Historical Provider, MD   vitamin D (ERGOCALCIFEROL) 1.25 MG (72033 UT) CAPS capsule TAKE 1 CAPSULE BY MOUTH EVERY SEVEN (7) DAYS.  WITH FOOD 5/27/22  Yes TIARA Alvarez CNP   omeprazole (PRILOSEC) 40 MG delayed release capsule Take 1 capsule by mouth daily At bedtime for stomach/reflux 5/27/22  Yes Shiv Denise APRN - CNP   tiZANidine (ZANAFLEX) 4 MG tablet Take 1 tablet by mouth nightly as needed (Muscle Spasm) May cause drowsiness 5/27/22  Yes TIARA Krishna - CNP   FLUoxetine (PROZAC) 10 MG capsule Take 10 mg by mouth daily 4/25/22  Yes Ar Automatic Reconciliation   Rimegepant Sulfate (NURTEC) 75 MG TBDP Take 75 mg by mouth daily as needed 3/23/22  Yes Ar Automatic Reconciliation       Allergies   Allergen Reactions    Sertraline Anxiety     Worsening symptoms with jaw clenching   , History reviewed. No pertinent past medical history. ,   Past Surgical History:   Procedure Laterality Date    CHOLECYSTECTOMY      GYN     ,   Social History     Tobacco Use    Smoking status: Former    Smokeless tobacco: Never   Substance Use Topics    Alcohol use: Yes    Drug use: Never   ,   Family History   Problem Relation Age of Onset    No Known Problems Mother     No Known Problems Father          Review of Systems   Constitutional: Negative. Negative for appetite change, chills, diaphoresis, fatigue, fever and unexpected weight change. HENT:  Positive for congestion (yellow d/c), postnasal drip, rhinorrhea (yellow), sinus pressure (left sided maxillary sinus pressure/pain), sinus pain (left sided maxillary sinus pressure/pain) and sore throat (left sided). Negative for dental problem, drooling, ear discharge, ear pain, facial swelling, hearing loss, mouth sores, nosebleeds, sneezing, tinnitus, trouble swallowing and voice change. Eyes: Negative. Negative for pain, discharge and visual disturbance. Respiratory:  Positive for cough (dry from PND). Negative for apnea, choking, chest tightness, shortness of breath, wheezing and stridor. Cardiovascular: Negative. Negative for chest pain, palpitations and leg swelling. Gastrointestinal: Negative. Negative for abdominal distention, abdominal pain, anal bleeding, blood in stool, constipation, diarrhea, nausea, rectal pain and vomiting. Endocrine: Negative. Genitourinary: Negative. Negative for decreased urine volume, difficulty urinating, dyspareunia, dysuria, flank pain, frequency, genital sores, hematuria, menstrual problem, pelvic pain, urgency, vaginal bleeding, vaginal discharge and vaginal pain. Musculoskeletal: Negative. Negative for arthralgias, back pain, gait problem, joint swelling, myalgias, neck pain and neck stiffness. Skin: Negative. Negative for pallor and rash. Allergic/Immunologic: Negative. Negative for environmental allergies. Neurological: Negative. Negative for dizziness, tremors, syncope, weakness, light-headedness, numbness and headaches. Hematological:  Positive for adenopathy (left sided cervical lymph nodes tender per pt). Does not bruise/bleed easily. Physical Exam  Constitutional:       General: She is not in acute distress. Appearance: Normal appearance. She is not ill-appearing, toxic-appearing or diaphoretic. HENT:      Head: Normocephalic and atraumatic. Pulmonary:      Effort: Pulmonary effort is normal. No respiratory distress. Comments: No audible wheezing. Speaks in clear sentences. No drooling. Tolerating secretions  Neurological:      Mental Status: She is alert and oriented to person, place, and time. Psychiatric:         Mood and Affect: Mood normal.         Behavior: Behavior normal.         Thought Content: Thought content normal.         Judgment: Judgment normal.           Porsha Fountain (: 1985) is a 30 y.o. female, Established patient patient, seen via A/V VV for evaluation of the following chief complaint(s):  Pharyngitis (Pt saw her PCP-Dixie Peter, NP 7/15/22 for URI symptoms. Strep test was negative at that time. Ronnie Mejia felt it was viral and tx pt with Zyrtec, Flonase, Astelin, and Singulair. Pt reports taking as directed but symptoms persisted/worsened yesterday.  Pt reports having nasal congestion w/ yellow d/c, left sided maxillary sinus pressure/pain, worsened sore throat, PND, dry cough from PND, and tender lymph nodes to left side of neck. Denies any fever, chills, malaise, SOB, wheezing, CP, drooling, N/V/D/abd pain. Pt states that she is under the care of ENT and plans to have tonsillectomy and septoplasty 10/11/22. LMP-7/19/22 per pt. We discussed the expected course, resolution and complications of the diagnosis(es) in detail. Medication risks, benefits, costs, interactions, and alternatives were discussed as indicated. I advised her to contact the office if her condition worsens, changes or fails to improve as anticipated. She expressed understanding with the diagnosis(es) and plan. Mauricio Masters, was evaluated through a synchronous (real-time) audio-video encounter. The patient (or guardian if applicable) is aware that this is a billable service. Verbal consent to proceed has been obtained within the past 12 months. The visit was conducted pursuant to the emergency declaration under the [address] [place] [address] and the [employer] Act. Patient identification was verified, and a caregiver was present when appropriate. The patient was located in a state where the provider was credentialed to provide care. Mauricio Masters, was evaluated through a synchronous (real-time) audio-video encounter. The patient (or guardian if applicable) is aware that this is a billable service, which includes applicable co-pays. This Virtual Visit was conducted with patient's (and/or legal guardian's) consent. The visit was conducted pursuant to the emergency declaration under the [address] [place] [address] and the [employer] Act. Patient identification was verified, and a caregiver was present when appropriate.    The patient was located at Other: work .   Provider was located at [facility] ([address] [place], [place] [address]t): [address] Karina Galvan [address],  [address]      Meera Live APRN - NP

## 2022-08-19 DIAGNOSIS — E55.9 VITAMIN D DEFICIENCY: ICD-10-CM

## 2022-08-23 ENCOUNTER — TELEMEDICINE (OUTPATIENT)
Dept: FAMILY MEDICINE CLINIC | Facility: CLINIC | Age: 37
End: 2022-08-23
Payer: COMMERCIAL

## 2022-08-23 DIAGNOSIS — F41.1 GENERALIZED ANXIETY DISORDER: ICD-10-CM

## 2022-08-23 DIAGNOSIS — F32.A DEPRESSION, UNSPECIFIED DEPRESSION TYPE: Primary | ICD-10-CM

## 2022-08-23 PROCEDURE — 99213 OFFICE O/P EST LOW 20 MIN: CPT | Performed by: NURSE PRACTITIONER

## 2022-08-23 RX ORDER — ESCITALOPRAM OXALATE 10 MG/1
TABLET ORAL
Qty: 30 TABLET | Refills: 1 | Status: SHIPPED | OUTPATIENT
Start: 2022-08-23 | End: 2022-08-23 | Stop reason: SDUPTHER

## 2022-08-23 RX ORDER — ERGOCALCIFEROL 1.25 MG/1
CAPSULE ORAL
Qty: 5 CAPSULE | Refills: 2 | Status: SHIPPED | OUTPATIENT
Start: 2022-08-23

## 2022-08-23 RX ORDER — BUPROPION HYDROCHLORIDE 150 MG/1
150 TABLET ORAL EVERY MORNING
Qty: 30 TABLET | Refills: 5 | Status: SHIPPED | OUTPATIENT
Start: 2022-08-23 | End: 2022-09-13 | Stop reason: DRUGHIGH

## 2022-08-23 RX ORDER — ESCITALOPRAM OXALATE 10 MG/1
TABLET ORAL
Qty: 30 TABLET | Refills: 1 | Status: SHIPPED | OUTPATIENT
Start: 2022-08-23 | End: 2022-09-13 | Stop reason: SDUPTHER

## 2022-08-23 ASSESSMENT — ENCOUNTER SYMPTOMS
WHEEZING: 0
COUGH: 0
CONSTIPATION: 0
SINUS PRESSURE: 0
EYES NEGATIVE: 1
VOMITING: 0
SHORTNESS OF BREATH: 0
BACK PAIN: 0
ABDOMINAL DISTENTION: 0
GASTROINTESTINAL NEGATIVE: 1
NAUSEA: 0
RECTAL PAIN: 0
BLOOD IN STOOL: 0
CHEST TIGHTNESS: 0
SINUS PAIN: 0
EYE PAIN: 0
ABDOMINAL PAIN: 0
TROUBLE SWALLOWING: 0
CHOKING: 0
RHINORRHEA: 0
COLOR CHANGE: 0
EYE DISCHARGE: 0
STRIDOR: 0
PHOTOPHOBIA: 0
FACIAL SWELLING: 0
DIARRHEA: 0
ANAL BLEEDING: 0
APNEA: 0
ALLERGIC/IMMUNOLOGIC NEGATIVE: 1
EYE ITCHING: 0
SORE THROAT: 0
VOICE CHANGE: 0
EYE REDNESS: 0

## 2022-08-23 NOTE — PROGRESS NOTES
PROGRESS NOTE        Consent: This patient and/or their healthcare decision maker is aware that this patient-initiated Telehealth encounter is a billable service, with coverage as determined by their insurance carrier. Patient is aware that they may receive a bill and has provided verbal consent to proceed: Yes    I was in the officewhile conducting this encounter. Patient was at home. This virtual visit was conducted via [employer]. Pursuant to the emergency declaration under the [facility], [facility] authority and the [employer] Act, this Virtual  Visit was conducted to reduce the patient's risk of exposure to COVID-19 and provide continuity of care for an established patient. Services were provided through a video synchronous discussion virtually to substitute for in-person clinic visit. Due to this being a TeleHealth evaluation, many elements of the physical examination are unable to be assessed. On this date 8/23/2022 I have spent 20 minutes reviewing previous notes, test results and face to face (virtual) with the patient discussing the diagnosis and importance of compliance with the treatment plan as well as documenting on the day of the visit. . Greater than 50% of this visit was spent counseling the patient about test results, prognosis, importance of compliance, education about disease process, benefits of medications, instructions for management of acute symptoms, and follow up plans. Chief Complaint   Patient presents with    Follow-up     Presenting for follow up on depression and anxiety. States that she is taking all of her medication but would like to discuss going back to Zoloft because she doesn't really feel a difference on the Prozac. States that she knows she had some jaw clenching with the Zoloft but it made her feel better.         SUBJECTIVE:     Odalys Wiggins is a very pleasant (ERGOCALCIFEROL) 1.25 MG (55793 UT) CAPS capsule TAKE 1 CAPSULE BY MOUTH EVERY SEVEN (7) DAYS. WITH FOOD 5 capsule 2    omeprazole (PRILOSEC) 40 MG delayed release capsule Take 1 capsule by mouth daily At bedtime for stomach/reflux 30 capsule 5    tiZANidine (ZANAFLEX) 4 MG tablet Take 1 tablet by mouth nightly as needed (Muscle Spasm) May cause drowsiness 30 tablet 2    Rimegepant Sulfate (NURTEC) 75 MG TBDP Take 75 mg by mouth daily as needed       No current facility-administered medications for this visit. Allergies   Allergen Reactions    Sertraline Anxiety     Worsening symptoms with jaw clenching     Patient Active Problem List   Diagnosis    Back pain    Throat pain    Throat infection     History reviewed. No pertinent past medical history. Past Surgical History:   Procedure Laterality Date    CHOLECYSTECTOMY      GYN       Family History   Problem Relation Age of Onset    No Known Problems Mother     No Known Problems Father      Social History     Tobacco Use    Smoking status: Former    Smokeless tobacco: Never   Substance Use Topics    Alcohol use: Yes         Review of Systems   Constitutional: Negative. Negative for activity change, appetite change, chills, diaphoresis, fatigue, fever and unexpected weight change. HENT:  Negative for congestion, dental problem, drooling, ear discharge, ear pain, facial swelling, hearing loss, mouth sores, nosebleeds, postnasal drip, rhinorrhea, sinus pressure, sinus pain, sneezing, sore throat, tinnitus, trouble swallowing and voice change. Eyes: Negative. Negative for photophobia, pain, discharge, redness, itching and visual disturbance. Respiratory:  Negative for apnea, cough, choking, chest tightness, shortness of breath, wheezing and stridor. Cardiovascular: Negative. Negative for chest pain, palpitations and leg swelling. Gastrointestinal: Negative.   Negative for abdominal distention, abdominal pain, anal bleeding, blood in stool, constipation, diarrhea, nausea, rectal pain and vomiting. Endocrine: Negative. Negative for cold intolerance, heat intolerance, polydipsia, polyphagia and polyuria. Genitourinary: Negative. Negative for decreased urine volume, difficulty urinating, dysuria, enuresis, flank pain, frequency, genital sores, hematuria and urgency. Musculoskeletal: Negative. Negative for arthralgias, back pain, gait problem, joint swelling, myalgias, neck pain and neck stiffness. Skin: Negative. Negative for color change, pallor, rash and wound. Allergic/Immunologic: Negative. Negative for environmental allergies, food allergies and immunocompromised state. Neurological: Negative. Negative for dizziness, tremors, seizures, syncope, facial asymmetry, speech difficulty, weakness, light-headedness, numbness and headaches. Hematological: Negative. Negative for adenopathy. Does not bruise/bleed easily. Psychiatric/Behavioral:  Positive for dysphoric mood. Negative for agitation, behavioral problems, confusion, decreased concentration, hallucinations, self-injury, sleep disturbance and suicidal ideas. The patient is nervous/anxious. The patient is not hyperactive. OBJECTIVE:    Patient's vital signs were not performed as encounter was performed virtually. Location of virtual visit was patient's home. Physical Exam  Constitutional:       General: She is not in acute distress. Appearance: Normal appearance. She is not toxic-appearing or diaphoretic. HENT:      Head: Normocephalic and atraumatic. Neurological:      General: No focal deficit present. Mental Status: She is alert and oriented to person, place, and time. Psychiatric:         Mood and Affect: Mood normal.         Behavior: Behavior normal.         Thought Content: Thought content normal.         Judgment: Judgment normal.        Medical problems and test results were reviewed with the patient today.    PHQ-9  8/22/2022 7/15/2022 11/3/2021   Little interest or pleasure in doing things 1 0 -   Little interest or pleasure in doing things - - 2   Feeling down, depressed, or hopeless 1 0 -   Trouble falling or staying asleep, or sleeping too much - - 3   Feeling tired or having little energy - - 3   Poor appetite, weight loss, or overeating - - 3   Feeling bad about yourself - or that you are a failure or have let yourself or your family down - - 3   Trouble concentrating on things such as school, work, reading, or watching TV - - 2   Moving or speaking so slowly that other people could have noticed; or the opposite being so fidgety that others notice - - 2   Thoughts of being better off dead, or hurting yourself in some way - - 1   PHQ-2 Score 2 0 -   Total Score PHQ 2 - - 4   PHQ-9 Total Score 2 0 -   PHQ 9 Score - - 21   How difficult have these problems made it for you to do your work, take care of your home and get along with others - - Very difficult      YURIDIA-7 SCREENING 8/22/2022 11/3/2021   Feeling nervous, anxious, or on edge Nearly every day -   Not being able to stop or control worrying Several days -   Worrying too much about different things More than half the days -   Trouble relaxing More than half the days -   Being so restless that it is hard to sit still Several days -   Becoming easily annoyed or irritable Nearly every day -   Feeling afraid as if something awful might happen Several days -   YURIDIA-7 Total Score 13 -   How difficult have these problems made it for you to do your work, take care of things at home, or get along with other people? Somewhat difficult Somewhat difficult   Feeling nervous, anxious, or on edge - Several days   YURIDIA-7 Total Score - 16      ASSESSMENT and PLAN    1. Depression, unspecified depression type  -     buPROPion (WELLBUTRIN XL) 150 MG extended release tablet;  Take 1 tablet by mouth every morning With food for anxiety/depression prevention, Disp-30 tablet, R-5Normal  -     escitalopram (LEXAPRO) 10 MG tablet; Take 1/2 tablet orally with food once daily for one week before increasing to 1 tablet orally once daily with food for anxiety/depression prevention, Disp-30 tablet, R-1Normal    PHQ-9 score of 2 and mattie score of 13. Patient reports no significant improvement in current regimen of Prozac 10 mg and Wellbutrin 150 mg daily. She was on a higher dose of Prozac 20 mg previously but not able to tolerate medication due to side effects. She has also tried Zoloft in the past and reports feeling better with use of medication but recall having significant jaw clenching side effects as a result. We discussed different treatment for anxiety/depression including other SSRI or may be a trial of SNRI. We will trial a low-dose of Lexapro in place with hopes of less side effects risk and increase effectiveness. We will have patient continue to take Wellbutrin 150 mg daily. We will follow-up for medication evaluation in 2 to 3 weeks. Reiterated side effects and advised patient to let us know if she has any questions or concerns. Also advised and stressed importance of stopping medication and seek help immediately if she has any thoughts or plans of self-harm or suicide or homicide. 2. Generalized anxiety disorder  -     buPROPion (WELLBUTRIN XL) 150 MG extended release tablet; Take 1 tablet by mouth every morning With food for anxiety/depression prevention, Disp-30 tablet, R-5Normal  -     escitalopram (LEXAPRO) 10 MG tablet; Take 1/2 tablet orally with food once daily for one week before increasing to 1 tablet orally once daily with food for anxiety/depression prevention, Disp-30 tablet, R-1Normal    As above. Elements of this note have been dictated using speech recognition software. As a result, errors of speech recognition may have occurred. Return in about 3 weeks (around 9/13/2022) for Medication follow up (ok to be virtual).      Joseph Barreto, TIARA - CNP

## 2022-09-12 PROBLEM — G43.109 MIGRAINE WITH AURA: Status: ACTIVE | Noted: 2022-09-12

## 2022-09-13 ENCOUNTER — TELEMEDICINE (OUTPATIENT)
Dept: FAMILY MEDICINE CLINIC | Facility: CLINIC | Age: 37
End: 2022-09-13
Payer: COMMERCIAL

## 2022-09-13 DIAGNOSIS — F32.A DEPRESSION, UNSPECIFIED DEPRESSION TYPE: ICD-10-CM

## 2022-09-13 DIAGNOSIS — J32.9 RECURRENT SINUSITIS: ICD-10-CM

## 2022-09-13 DIAGNOSIS — J34.89 SINUS PRESSURE: ICD-10-CM

## 2022-09-13 DIAGNOSIS — F41.1 GENERALIZED ANXIETY DISORDER: Primary | ICD-10-CM

## 2022-09-13 PROCEDURE — 99214 OFFICE O/P EST MOD 30 MIN: CPT | Performed by: NURSE PRACTITIONER

## 2022-09-13 RX ORDER — PREDNISONE 20 MG/1
TABLET ORAL
Qty: 8 TABLET | Refills: 0 | Status: SHIPPED | OUTPATIENT
Start: 2022-09-13 | End: 2022-10-04 | Stop reason: ALTCHOICE

## 2022-09-13 RX ORDER — ESCITALOPRAM OXALATE 10 MG/1
TABLET ORAL
Qty: 30 TABLET | Refills: 2 | Status: SHIPPED | OUTPATIENT
Start: 2022-09-13 | End: 2022-10-04 | Stop reason: DRUGHIGH

## 2022-09-13 RX ORDER — BUPROPION HYDROCHLORIDE 300 MG/1
300 TABLET ORAL EVERY MORNING
Qty: 30 TABLET | Refills: 3 | Status: SHIPPED | OUTPATIENT
Start: 2022-09-13

## 2022-09-13 ASSESSMENT — ENCOUNTER SYMPTOMS
CHEST TIGHTNESS: 0
COLOR CHANGE: 0
RECTAL PAIN: 0
EYE ITCHING: 0
BLOOD IN STOOL: 0
CHOKING: 0
GASTROINTESTINAL NEGATIVE: 1
TROUBLE SWALLOWING: 0
WHEEZING: 0
SHORTNESS OF BREATH: 0
ALLERGIC/IMMUNOLOGIC NEGATIVE: 1
SORE THROAT: 1
STRIDOR: 0
SINUS PRESSURE: 1
RESPIRATORY NEGATIVE: 1
RHINORRHEA: 0
CONSTIPATION: 0
EYE DISCHARGE: 0
ABDOMINAL PAIN: 0
EYE PAIN: 0
ANAL BLEEDING: 0
VOMITING: 0
EYE REDNESS: 0
VOICE CHANGE: 0
EYES NEGATIVE: 1
BACK PAIN: 0
COUGH: 0
DIARRHEA: 0
FACIAL SWELLING: 0
PHOTOPHOBIA: 0
APNEA: 0
SINUS PAIN: 1
ABDOMINAL DISTENTION: 0
NAUSEA: 0

## 2022-09-13 NOTE — PROGRESS NOTES
PROGRESS NOTE        Consent: This patient and/or their healthcare decision maker is aware that this patient-initiated Telehealth encounter is a billable service, with coverage as determined by their insurance carrier. Patient is aware that they may receive a bill and has provided verbal consent to proceed: Yes    I was in the officewhile conducting this encounter. Patient was at home. This virtual visit was conducted via [employer]. Pursuant to the emergency declaration under the [facility], [facility] waiver authority and the [employer] Act, this Virtual  Visit was conducted to reduce the patient's risk of exposure to COVID-19 and provide continuity of care for an established patient. Services were provided through a video synchronous discussion virtually to substitute for in-person clinic visit. Due to this being a TeleHealth evaluation, many elements of the physical examination are unable to be assessed. On this date 9/13/2022 I have spent 30 minutes reviewing previous notes, test results and face to face (virtual) with the patient discussing the diagnosis and importance of compliance with the treatment plan as well as documenting on the day of the visit. . Greater than 50% of this visit was spent counseling the patient about test results, prognosis, importance of compliance, education about disease process, benefits of medications, instructions for management of acute symptoms, and follow up plans. Chief Complaint   Patient presents with    Follow-up     Patient taking lexapro and wellbutrin. States that her symptoms have improved.        SUBJECTIVE:     Gregg Maldonado is a very pleasant 30 y.o. female , with past medical history significant for anxiety, migraine headache, seasonal allergies, recurrent strep throat, tonsillitis and sinusitis - currently following ENT, vitamin D deficiency, GERD, back pain, and arthritis, seen virtually regarding her anxiety management. Patient reports no side effects with use of Lexapro 10 mg and Wellbutrin 150 mg XL. She does report no significant effectiveness with use of medication however but reports no worsening in symptom of depression or anxiety. Patient does report increase in sinus pressure, drainage that is bloody to clear whitish coloration and increase in sweating and feeling feverish. She is currently following ENT and currently scheduled to have septoplasty in addition to tonsillectomy on 10/11. Pertinent negatives include no fever or chills, no coughing, no chest pain, no shortness of breath, no palpitation, no unilateral or focal weakness, no nausea, no vomiting, no diarrhea. Past Medical History, Past Surgical History, Family history, Social History, and Medications were all reviewed with the patient today and updated as necessary. Current Outpatient Medications   Medication Sig Dispense Refill    buPROPion (WELLBUTRIN XL) 300 MG extended release tablet Take 1 tablet by mouth every morning With food 30 tablet 3    predniSONE (DELTASONE) 20 MG tablet Take 2 tablets with food by mouth every morning for 3 days, then 1 tablet with food by mouth every morning for 2 days then stop. 8 tablet 0    escitalopram (LEXAPRO) 10 MG tablet Take 1 tablet orally once daily with food for anxiety/depression prevention 30 tablet 2    vitamin D (ERGOCALCIFEROL) 1.25 MG (28545 UT) CAPS capsule TAKE 1 CAPSULE BY MOUTH EVERY SEVEN (7) DAYS. WITH FOOD 5 capsule 2    montelukast (SINGULAIR) 10 MG tablet Take 1 tablet by mouth nightly For allergies/postnasal drip 30 tablet 5    SLYND 4 MG TABS       azelastine (ASTELIN) 0.1 % nasal spray 1 SPRAY BY BOTH NOSTRILS ROUTE TWO (2) TIMES A DAY.  USE IN EACH NOSTRIL AS DIRECTED      omeprazole (PRILOSEC) 40 MG delayed release capsule Take 1 capsule by mouth daily At bedtime for stomach/reflux 30 capsule 5    tiZANidine (ZANAFLEX) 4 MG tablet Take 1 tablet by mouth nightly as needed (Muscle Spasm) May cause drowsiness 30 tablet 2    Rimegepant Sulfate (NURTEC) 75 MG TBDP Take 75 mg by mouth daily as needed       No current facility-administered medications for this visit. Allergies   Allergen Reactions    Sertraline Anxiety     Worsening symptoms with jaw clenching     Patient Active Problem List   Diagnosis    Back pain    Throat pain    Throat infection    Migraine with aura     History reviewed. No pertinent past medical history. Past Surgical History:   Procedure Laterality Date    CHOLECYSTECTOMY      GYN       Family History   Problem Relation Age of Onset    No Known Problems Mother     No Known Problems Father      Social History     Tobacco Use    Smoking status: Former    Smokeless tobacco: Never   Substance Use Topics    Alcohol use: Yes         Review of Systems   Constitutional:  Positive for fatigue. Negative for activity change, appetite change, chills, diaphoresis, fever and unexpected weight change. HENT:  Positive for congestion, sinus pressure, sinus pain and sore throat. Negative for dental problem, drooling, ear discharge, ear pain, facial swelling, hearing loss, mouth sores, nosebleeds, postnasal drip, rhinorrhea, sneezing, tinnitus, trouble swallowing and voice change. Eyes: Negative. Negative for photophobia, pain, discharge, redness, itching and visual disturbance. Respiratory: Negative. Negative for apnea, cough, choking, chest tightness, shortness of breath, wheezing and stridor. Cardiovascular: Negative. Negative for chest pain, palpitations and leg swelling. Gastrointestinal: Negative. Negative for abdominal distention, abdominal pain, anal bleeding, blood in stool, constipation, diarrhea, nausea, rectal pain and vomiting. Endocrine: Negative. Negative for cold intolerance, heat intolerance, polydipsia, polyphagia and polyuria. Genitourinary: Negative.   Negative for decreased urine volume, difficulty urinating, dysuria, enuresis, flank pain, frequency, genital sores, hematuria and urgency. Musculoskeletal: Negative. Negative for arthralgias, back pain, gait problem, joint swelling, myalgias, neck pain and neck stiffness. Skin: Negative. Negative for color change, pallor, rash and wound. Allergic/Immunologic: Negative. Negative for environmental allergies, food allergies and immunocompromised state. Neurological: Negative. Negative for dizziness, tremors, seizures, syncope, facial asymmetry, speech difficulty, weakness, light-headedness, numbness and headaches. Hematological: Negative. Negative for adenopathy. Does not bruise/bleed easily. Psychiatric/Behavioral:  Positive for decreased concentration. Negative for agitation, behavioral problems, confusion, dysphoric mood, hallucinations, self-injury, sleep disturbance and suicidal ideas. The patient is nervous/anxious. The patient is not hyperactive. OBJECTIVE:    Patient's vital signs were not performed as encounter was performed virtually. Location of virtual visit was patient's home. Physical Exam  Constitutional:       General: She is not in acute distress. Appearance: Normal appearance. She is not toxic-appearing or diaphoretic. HENT:      Head: Normocephalic and atraumatic. Neurological:      General: No focal deficit present. Mental Status: She is alert and oriented to person, place, and time. Psychiatric:         Mood and Affect: Mood normal.         Behavior: Behavior normal.         Thought Content: Thought content normal.         Judgment: Judgment normal.        Medical problems and test results were reviewed with the patient today. ASSESSMENT and PLAN    1. Generalized anxiety disorder  -     buPROPion (WELLBUTRIN XL) 300 MG extended release tablet; Take 1 tablet by mouth every morning With food, Disp-30 tablet, R-3Normal  -     escitalopram (LEXAPRO) 10 MG tablet;  Take 1 tablet orally once daily with food for anxiety/depression prevention, Disp-30 tablet, R-2    Patient reports doing well with Lexapro no side effects. No worsening in symptoms. We will increase in Wellbutrin dose of 300 mg and keep Lexapro at 10 mg at this time due to concern of side effects with jaw clenching associated with Zoloft and Prozac. Advised patient to let us know if she has any side effects or worsening in symptoms. We will follow-up for medication check in 2 to 3 weeks. 2. Depression, unspecified depression type  -     buPROPion (WELLBUTRIN XL) 300 MG extended release tablet; Take 1 tablet by mouth every morning With food, Disp-30 tablet, R-3Normal  -     escitalopram (LEXAPRO) 10 MG tablet; Take 1 tablet orally once daily with food for anxiety/depression prevention, Disp-30 tablet, R-2    As above. 3. Sinus pressure  -     predniSONE (DELTASONE) 20 MG tablet; Take 2 tablets with food by mouth every morning for 3 days, then 1 tablet with food by mouth every morning for 2 days then stop., Disp-8 tablet, R-0Normal    Patient with history of recurring sinus pressure and infection along with reoccurring strep throat. She is currently following ENT and currently scheduled for septoplasty with tonsillectomy on 10/11. As there are no signs symptom of infection at this time although there is congestion and pressure, will start prednisone course for 5 days only. There was side effects. We will hold off on oral antibiotics at this time. Advised patient let us know if symptoms do not improve or resolve. Continue with nasal spray as currently prescribed. 4. Recurrent sinusitis  -     predniSONE (DELTASONE) 20 MG tablet; Take 2 tablets with food by mouth every morning for 3 days, then 1 tablet with food by mouth every morning for 2 days then stop., Disp-8 tablet, R-0Normal    As above. Elements of this note have been dictated using speech recognition software.  As a result, errors of speech recognition may have occurred.     Return in about 3 weeks (around 10/4/2022) for Medication follow up virtually at 8 am.     Araceli John, TIARA - CNP

## 2022-10-04 ENCOUNTER — TELEMEDICINE (OUTPATIENT)
Dept: FAMILY MEDICINE CLINIC | Facility: CLINIC | Age: 37
End: 2022-10-04
Payer: COMMERCIAL

## 2022-10-04 DIAGNOSIS — F32.A DEPRESSION, UNSPECIFIED DEPRESSION TYPE: ICD-10-CM

## 2022-10-04 DIAGNOSIS — F41.1 GENERALIZED ANXIETY DISORDER: Primary | ICD-10-CM

## 2022-10-04 DIAGNOSIS — J34.89 SINUS PRESSURE: ICD-10-CM

## 2022-10-04 PROCEDURE — 99214 OFFICE O/P EST MOD 30 MIN: CPT | Performed by: NURSE PRACTITIONER

## 2022-10-04 RX ORDER — ESCITALOPRAM OXALATE 20 MG/1
20 TABLET ORAL DAILY
Qty: 30 TABLET | Refills: 3 | Status: SHIPPED | OUTPATIENT
Start: 2022-10-04

## 2022-10-04 RX ORDER — TRAZODONE HYDROCHLORIDE 50 MG/1
TABLET ORAL
Qty: 30 TABLET | Refills: 3 | Status: SHIPPED | OUTPATIENT
Start: 2022-10-04

## 2022-10-04 ASSESSMENT — ENCOUNTER SYMPTOMS
ANAL BLEEDING: 0
SORE THROAT: 1
CHEST TIGHTNESS: 0
VOICE CHANGE: 0
ABDOMINAL PAIN: 0
RHINORRHEA: 0
EYE REDNESS: 0
SINUS PAIN: 1
APNEA: 0
EYE DISCHARGE: 0
EYES NEGATIVE: 1
GASTROINTESTINAL NEGATIVE: 1
DIARRHEA: 0
EYE PAIN: 0
TROUBLE SWALLOWING: 0
CONSTIPATION: 0
EYE ITCHING: 0
SINUS PRESSURE: 1
BACK PAIN: 0
ABDOMINAL DISTENTION: 0
COUGH: 0
NAUSEA: 0
FACIAL SWELLING: 0
VOMITING: 0
WHEEZING: 0
CHOKING: 0
ALLERGIC/IMMUNOLOGIC NEGATIVE: 1
STRIDOR: 0
RECTAL PAIN: 0
RESPIRATORY NEGATIVE: 1
COLOR CHANGE: 0
SHORTNESS OF BREATH: 0
BLOOD IN STOOL: 0
PHOTOPHOBIA: 0

## 2022-10-04 NOTE — PROGRESS NOTES
PROGRESS NOTE        Consent: This patient and/or their healthcare decision maker is aware that this patient-initiated Telehealth encounter is a billable service, with coverage as determined by their insurance carrier. Patient is aware that they may receive a bill and has provided verbal consent to proceed: Yes    I was in the officewhile conducting this encounter. Patient was at home. This virtual visit was conducted via [employer]. Pursuant to the emergency declaration under the [facility], [facility] waiver authority and the [employer] Act, this Virtual  Visit was conducted to reduce the patient's risk of exposure to COVID-19 and provide continuity of care for an established patient. Services were provided through a video synchronous discussion virtually to substitute for in-person clinic visit. Due to this being a TeleHealth evaluation, many elements of the physical examination are unable to be assessed. On this date 10/4/2022 I have spent 30 minutes reviewing previous notes, test results and face to face (virtual) with the patient discussing the diagnosis and importance of compliance with the treatment plan as well as documenting on the day of the visit. . Greater than 50% of this visit was spent counseling the patient about test results, prognosis, importance of compliance, education about disease process, benefits of medications, instructions for management of acute symptoms, and follow up plans. Chief Complaint   Patient presents with    Follow-up         SUBJECTIVE:     Mandeep Potter is a very pleasant 30 y.o. female , with past medical history significant for anxiety, migraine headache, seasonal allergies, recurrent strep throat, tonsillitis and sinusitis - currently following ENT, vitamin D deficiency, GERD, back pain, and arthritis, seen virtually regarding anxiety/depression management.   Patient is currently on Wellbutrin 300 mg daily and Lexapro 10 mg daily for prevention. She would like to have Lexapro to be increased to 20 mg daily to assist with her anxiety management. She does endorse difficulty sleeping. She has taken tizanidine in the past to assist with muscle relaxant and to help her with sleep. However, she is only able to sleep for few hours prior to waking back up. She is now out of tizanidine. She has not tried any prescriptive medication to assist with sleep. No thoughts or plans of self-harm or suicide or homicide. Also of note, patient was seen at the ED on 9/19 for complaints of dizziness, nausea, weakness and left shoulder pain. Patient was at work at that time when she started to have symptoms. She had a negative EKG, labs, chest x-ray. Patient was discharged home. Patient did have significant sinus pressure and congestion which may be contributory to her symptoms. She is currently following Dr. Zahra Bell with ENT and is currently scheduled for septoplasty, inferior turbinate reduction, sinus endoscopy and tonsillectomy on 10/11. Past Medical History, Past Surgical History, Family history, Social History, and Medications were all reviewed with the patient today and updated as necessary. Current Outpatient Medications   Medication Sig Dispense Refill    escitalopram (LEXAPRO) 20 MG tablet Take 1 tablet by mouth daily For anxiety/depression prevention 30 tablet 3    traZODone (DESYREL) 50 MG tablet Take 1/2 to 1 tablet orally at bedtime as needed for sleep 30 tablet 3    buPROPion (WELLBUTRIN XL) 300 MG extended release tablet Take 1 tablet by mouth every morning With food 30 tablet 3    vitamin D (ERGOCALCIFEROL) 1.25 MG (94499 UT) CAPS capsule TAKE 1 CAPSULE BY MOUTH EVERY SEVEN (7) DAYS.  WITH FOOD 5 capsule 2    montelukast (SINGULAIR) 10 MG tablet Take 1 tablet by mouth nightly For allergies/postnasal drip 30 tablet 5    SLYND 4 MG TABS       azelastine (ASTELIN) 0.1 % nasal spray 1 SPRAY BY BOTH NOSTRILS ROUTE TWO (2) TIMES A DAY. USE IN EACH NOSTRIL AS DIRECTED      omeprazole (PRILOSEC) 40 MG delayed release capsule Take 1 capsule by mouth daily At bedtime for stomach/reflux 30 capsule 5    tiZANidine (ZANAFLEX) 4 MG tablet Take 1 tablet by mouth nightly as needed (Muscle Spasm) May cause drowsiness 30 tablet 2    Rimegepant Sulfate (NURTEC) 75 MG TBDP Take 75 mg by mouth daily as needed       No current facility-administered medications for this visit. Allergies   Allergen Reactions    Sertraline Anxiety     Worsening symptoms with jaw clenching     Patient Active Problem List   Diagnosis    Back pain    Throat pain    Throat infection    Migraine with aura     History reviewed. No pertinent past medical history. Past Surgical History:   Procedure Laterality Date    CHOLECYSTECTOMY      GYN       Family History   Problem Relation Age of Onset    No Known Problems Mother     No Known Problems Father      Social History     Tobacco Use    Smoking status: Former    Smokeless tobacco: Never   Substance Use Topics    Alcohol use: Yes         Review of Systems   Constitutional:  Positive for fatigue. Negative for activity change, appetite change, chills, diaphoresis, fever and unexpected weight change. HENT:  Positive for congestion, ear pain, sinus pressure, sinus pain and sore throat. Negative for dental problem, drooling, ear discharge, facial swelling, hearing loss, mouth sores, nosebleeds, postnasal drip, rhinorrhea, sneezing, tinnitus, trouble swallowing and voice change. Eyes: Negative. Negative for photophobia, pain, discharge, redness, itching and visual disturbance. Respiratory: Negative. Negative for apnea, cough, choking, chest tightness, shortness of breath, wheezing and stridor. Cardiovascular: Negative. Negative for chest pain, palpitations and leg swelling. Gastrointestinal: Negative.   Negative for abdominal distention, abdominal pain, anal bleeding, blood in stool, constipation, diarrhea, nausea, rectal pain and vomiting. Endocrine: Negative. Negative for cold intolerance, heat intolerance, polydipsia, polyphagia and polyuria. Genitourinary: Negative. Negative for decreased urine volume, difficulty urinating, dysuria, enuresis, flank pain, frequency, genital sores, hematuria and urgency. Musculoskeletal: Negative. Negative for arthralgias, back pain, gait problem, joint swelling, myalgias, neck pain and neck stiffness. Skin: Negative. Negative for color change, pallor, rash and wound. Allergic/Immunologic: Negative. Negative for environmental allergies, food allergies and immunocompromised state. Neurological: Negative. Negative for dizziness, tremors, seizures, syncope, facial asymmetry, speech difficulty, weakness, light-headedness, numbness and headaches. Hematological: Negative. Negative for adenopathy. Does not bruise/bleed easily. Psychiatric/Behavioral:  Positive for decreased concentration and sleep disturbance. Negative for agitation, behavioral problems, confusion, dysphoric mood, hallucinations, self-injury and suicidal ideas. The patient is nervous/anxious. The patient is not hyperactive. OBJECTIVE:    Patient's vital signs were not performed as encounter was performed virtually. Location of virtual visit was patient's home. Physical Exam  Constitutional:       General: She is not in acute distress. Appearance: Normal appearance. She is not toxic-appearing or diaphoretic. HENT:      Head: Normocephalic and atraumatic. Pulmonary:      Comments: Due to limitation nature of virtual visit, I was unable to auscultate patient. No audible wheezing noted via video. Neurological:      General: No focal deficit present. Mental Status: She is alert and oriented to person, place, and time.    Psychiatric:         Mood and Affect: Mood normal.         Behavior: Behavior normal.         Thought Content: Thought content normal.         Judgment: Judgment normal.        Medical problems and test results were reviewed with the patient today. ASSESSMENT and PLAN    1. Generalized anxiety disorder  -     escitalopram (LEXAPRO) 20 MG tablet; Take 1 tablet by mouth daily For anxiety/depression prevention, Disp-30 tablet, R-3Normal  -     traZODone (DESYREL) 50 MG tablet; Take 1/2 to 1 tablet orally at bedtime as needed for sleep, Disp-30 tablet, R-3Normal    Patient is currently taking Wellbutrin 300 mg with Lexapro 10 mg daily but reports persistent symptoms of anxiety/depression. She is interested in increasing dose of Lexapro. We will increase dose to 20 mg daily. Continue Wellbutrin 300 mg daily. She is also having difficulty with sleeping at bedtime which may be contributory to her symptoms. We will also prescribe trazodone as needed at bedtime for sleep. Review risk and side effects of medication including drowsiness, dizziness, increased risk of constipation, increased risk of injury. Patient was advised not to take medication and drive or operate machinery due to increased risk of injury. Patient was also advised to exercise caution when getting up after taking medication to avoid falls. Follow-up in 6 to 8 weeks for medication evaluation. 2. Depression, unspecified depression type  -     escitalopram (LEXAPRO) 20 MG tablet; Take 1 tablet by mouth daily For anxiety/depression prevention, Disp-30 tablet, R-3Normal    As above. 3. Sinus pressure   Patient to continue current regimen and to keep follow-up appointment with her ENT specialist for treatment recommendation. Elements of this note have been dictated using speech recognition software. As a result, errors of speech recognition may have occurred. Return in about 8 weeks (around 11/29/2022) for Medication follow up (ok to be virtual).      Jean Sauceda, APRN - CNP

## 2022-10-06 NOTE — PERIOP NOTE
Patient verified name and . Order for consent not found in EHR and matches case posting; patient verifies procedure. Type 1b surgery, PAT phone assessment complete. Orders not received. Labs per surgeon: None  Labs per anesthesia protocol: None    Patient answered medical/surgical history questions at their best of ability. All prior to admission medications documented in [facility]. Patient instructed to take the following medications the day of surgery according to anesthesia guidelines with a small sip of water: Wellbutrin, Lexapro, Slynd, Will On the day before surgery please take Acetaminophen 1000mg in the morning and then again before bed. You may substitute for Tylenol 650 mg. Hold all vitamins 7 days prior to surgery and NSAIDS 5 days prior to surgery. Prescription meds to hold:None  Patient instructed on the following:    > Arrive at A Entrance, time of arrival to be called the day before by 1700  > NPO after midnight, unless otherwise indicated, including gum, mints, and ice chips  > Responsible adult must drive patient to the hospital, stay during surgery, and patient will need supervision 24 hours after anesthesia  > Use antibacterial soap in shower the night before surgery and on the morning of surgery  > All piercings must be removed prior to arrival.    > Leave all valuables (money and jewelry) at home but bring insurance card and ID on DOS.   > You may be required to pay a deductible or co-pay on the day of your procedure. You can pre-pay by calling [phone] if your surgery is at the [facility] or [phone] if your surgery is at the [facility]. > Do not wear make-up, nail polish, lotions, cologne, perfumes, powders, or oil on skin. Artificial nails are not permitted.

## 2022-10-10 ENCOUNTER — ANESTHESIA EVENT (OUTPATIENT)
Dept: SURGERY | Age: 37
End: 2022-10-10
Payer: COMMERCIAL

## 2022-10-11 ENCOUNTER — ANESTHESIA (OUTPATIENT)
Dept: SURGERY | Age: 37
End: 2022-10-11
Payer: COMMERCIAL

## 2022-10-11 ENCOUNTER — HOSPITAL ENCOUNTER (OUTPATIENT)
Age: 37
Setting detail: OUTPATIENT SURGERY
Discharge: HOME | End: 2022-10-11
Attending: OTOLARYNGOLOGY | Admitting: OTOLARYNGOLOGY
Payer: COMMERCIAL

## 2022-10-11 VITALS
RESPIRATION RATE: 16 BRPM | OXYGEN SATURATION: 97 % | HEIGHT: 69 IN | BODY MASS INDEX: 31.96 KG/M2 | TEMPERATURE: 97.2 F | SYSTOLIC BLOOD PRESSURE: 140 MMHG | DIASTOLIC BLOOD PRESSURE: 85 MMHG | HEART RATE: 87 BPM | WEIGHT: 215.8 LBS

## 2022-10-11 LAB — HCG UR QL: NEGATIVE

## 2022-10-11 PROCEDURE — 2500000003 HC RX 250 WO HCPCS: Performed by: OTOLARYNGOLOGY

## 2022-10-11 PROCEDURE — 6360000002 HC RX W HCPCS: Performed by: ANESTHESIOLOGY

## 2022-10-11 PROCEDURE — 3600000004 HC SURGERY LEVEL 4 BASE: Performed by: OTOLARYNGOLOGY

## 2022-10-11 PROCEDURE — 6370000000 HC RX 637 (ALT 250 FOR IP): Performed by: ANESTHESIOLOGY

## 2022-10-11 PROCEDURE — 7100000000 HC PACU RECOVERY - FIRST 15 MIN: Performed by: OTOLARYNGOLOGY

## 2022-10-11 PROCEDURE — 3600000014 HC SURGERY LEVEL 4 ADDTL 15MIN: Performed by: OTOLARYNGOLOGY

## 2022-10-11 PROCEDURE — 7100000010 HC PHASE II RECOVERY - FIRST 15 MIN: Performed by: OTOLARYNGOLOGY

## 2022-10-11 PROCEDURE — C1726 CATH, BAL DIL, NON-VASCULAR: HCPCS | Performed by: OTOLARYNGOLOGY

## 2022-10-11 PROCEDURE — 6370000000 HC RX 637 (ALT 250 FOR IP): Performed by: OTOLARYNGOLOGY

## 2022-10-11 PROCEDURE — 6360000002 HC RX W HCPCS: Performed by: REGISTERED NURSE

## 2022-10-11 PROCEDURE — 81025 URINE PREGNANCY TEST: CPT

## 2022-10-11 PROCEDURE — 2720000010 HC SURG SUPPLY STERILE: Performed by: OTOLARYNGOLOGY

## 2022-10-11 PROCEDURE — 2709999900 HC NON-CHARGEABLE SUPPLY: Performed by: OTOLARYNGOLOGY

## 2022-10-11 PROCEDURE — 2500000003 HC RX 250 WO HCPCS: Performed by: REGISTERED NURSE

## 2022-10-11 PROCEDURE — 7100000011 HC PHASE II RECOVERY - ADDTL 15 MIN: Performed by: OTOLARYNGOLOGY

## 2022-10-11 PROCEDURE — 3700000000 HC ANESTHESIA ATTENDED CARE: Performed by: OTOLARYNGOLOGY

## 2022-10-11 PROCEDURE — 7100000001 HC PACU RECOVERY - ADDTL 15 MIN: Performed by: OTOLARYNGOLOGY

## 2022-10-11 PROCEDURE — 3700000001 HC ADD 15 MINUTES (ANESTHESIA): Performed by: OTOLARYNGOLOGY

## 2022-10-11 PROCEDURE — 2580000003 HC RX 258: Performed by: ANESTHESIOLOGY

## 2022-10-11 RX ORDER — FENTANYL CITRATE 50 UG/ML
INJECTION, SOLUTION INTRAMUSCULAR; INTRAVENOUS PRN
Status: DISCONTINUED | OUTPATIENT
Start: 2022-10-11 | End: 2022-10-11 | Stop reason: SDUPTHER

## 2022-10-11 RX ORDER — SUCCINYLCHOLINE CHLORIDE 20 MG/ML
INJECTION INTRAMUSCULAR; INTRAVENOUS PRN
Status: DISCONTINUED | OUTPATIENT
Start: 2022-10-11 | End: 2022-10-11 | Stop reason: SDUPTHER

## 2022-10-11 RX ORDER — OXYCODONE HYDROCHLORIDE 5 MG/1
5 TABLET ORAL PRN
Status: COMPLETED | OUTPATIENT
Start: 2022-10-11 | End: 2022-10-11

## 2022-10-11 RX ORDER — HYDROMORPHONE HYDROCHLORIDE 1 MG/ML
INJECTION, SOLUTION INTRAMUSCULAR; INTRAVENOUS; SUBCUTANEOUS PRN
Status: DISCONTINUED | OUTPATIENT
Start: 2022-10-11 | End: 2022-10-11 | Stop reason: SDUPTHER

## 2022-10-11 RX ORDER — ONDANSETRON 4 MG/1
4 TABLET, ORALLY DISINTEGRATING ORAL ONCE
Status: COMPLETED | OUTPATIENT
Start: 2022-10-11 | End: 2022-10-11

## 2022-10-11 RX ORDER — ONDANSETRON 2 MG/ML
INJECTION INTRAMUSCULAR; INTRAVENOUS PRN
Status: DISCONTINUED | OUTPATIENT
Start: 2022-10-11 | End: 2022-10-11 | Stop reason: SDUPTHER

## 2022-10-11 RX ORDER — LIDOCAINE HYDROCHLORIDE AND EPINEPHRINE BITARTRATE 20; .01 MG/ML; MG/ML
INJECTION, SOLUTION SUBCUTANEOUS PRN
Status: DISCONTINUED | OUTPATIENT
Start: 2022-10-11 | End: 2022-10-11 | Stop reason: HOSPADM

## 2022-10-11 RX ORDER — OXYCODONE HYDROCHLORIDE 5 MG/1
10 TABLET ORAL PRN
Status: COMPLETED | OUTPATIENT
Start: 2022-10-11 | End: 2022-10-11

## 2022-10-11 RX ORDER — HYDROMORPHONE HYDROCHLORIDE 1 MG/ML
0.5 INJECTION, SOLUTION INTRAMUSCULAR; INTRAVENOUS; SUBCUTANEOUS EVERY 5 MIN PRN
Status: COMPLETED | OUTPATIENT
Start: 2022-10-11 | End: 2022-10-11

## 2022-10-11 RX ORDER — FENTANYL CITRATE 50 UG/ML
100 INJECTION, SOLUTION INTRAMUSCULAR; INTRAVENOUS
Status: DISCONTINUED | OUTPATIENT
Start: 2022-10-11 | End: 2022-10-11 | Stop reason: HOSPADM

## 2022-10-11 RX ORDER — DIPHENHYDRAMINE HYDROCHLORIDE 50 MG/ML
12.5 INJECTION INTRAMUSCULAR; INTRAVENOUS
Status: DISCONTINUED | OUTPATIENT
Start: 2022-10-11 | End: 2022-10-11 | Stop reason: HOSPADM

## 2022-10-11 RX ORDER — DEXAMETHASONE SODIUM PHOSPHATE 10 MG/ML
INJECTION INTRAMUSCULAR; INTRAVENOUS PRN
Status: DISCONTINUED | OUTPATIENT
Start: 2022-10-11 | End: 2022-10-11 | Stop reason: SDUPTHER

## 2022-10-11 RX ORDER — ONDANSETRON 2 MG/ML
4 INJECTION INTRAMUSCULAR; INTRAVENOUS
Status: DISCONTINUED | OUTPATIENT
Start: 2022-10-11 | End: 2022-10-11 | Stop reason: HOSPADM

## 2022-10-11 RX ORDER — ACETAMINOPHEN 500 MG
1000 TABLET ORAL ONCE
Status: COMPLETED | OUTPATIENT
Start: 2022-10-11 | End: 2022-10-11

## 2022-10-11 RX ORDER — LIDOCAINE HYDROCHLORIDE 20 MG/ML
INJECTION, SOLUTION EPIDURAL; INFILTRATION; INTRACAUDAL; PERINEURAL PRN
Status: DISCONTINUED | OUTPATIENT
Start: 2022-10-11 | End: 2022-10-11 | Stop reason: SDUPTHER

## 2022-10-11 RX ORDER — OXYMETAZOLINE HYDROCHLORIDE 0.05 G/100ML
SPRAY NASAL PRN
Status: DISCONTINUED | OUTPATIENT
Start: 2022-10-11 | End: 2022-10-11 | Stop reason: HOSPADM

## 2022-10-11 RX ORDER — PROPOFOL 10 MG/ML
INJECTION, EMULSION INTRAVENOUS PRN
Status: DISCONTINUED | OUTPATIENT
Start: 2022-10-11 | End: 2022-10-11 | Stop reason: SDUPTHER

## 2022-10-11 RX ORDER — SODIUM CHLORIDE 0.9 % (FLUSH) 0.9 %
5-40 SYRINGE (ML) INJECTION EVERY 12 HOURS SCHEDULED
Status: DISCONTINUED | OUTPATIENT
Start: 2022-10-11 | End: 2022-10-11 | Stop reason: HOSPADM

## 2022-10-11 RX ORDER — MIDAZOLAM HYDROCHLORIDE 2 MG/2ML
2 INJECTION, SOLUTION INTRAMUSCULAR; INTRAVENOUS
Status: COMPLETED | OUTPATIENT
Start: 2022-10-11 | End: 2022-10-11

## 2022-10-11 RX ORDER — SODIUM CHLORIDE, SODIUM LACTATE, POTASSIUM CHLORIDE, CALCIUM CHLORIDE 600; 310; 30; 20 MG/100ML; MG/100ML; MG/100ML; MG/100ML
INJECTION, SOLUTION INTRAVENOUS CONTINUOUS
Status: DISCONTINUED | OUTPATIENT
Start: 2022-10-11 | End: 2022-10-11 | Stop reason: HOSPADM

## 2022-10-11 RX ORDER — SODIUM CHLORIDE 0.9 % (FLUSH) 0.9 %
5-40 SYRINGE (ML) INJECTION PRN
Status: DISCONTINUED | OUTPATIENT
Start: 2022-10-11 | End: 2022-10-11 | Stop reason: HOSPADM

## 2022-10-11 RX ADMIN — ONDANSETRON 4 MG: 4 TABLET, ORALLY DISINTEGRATING ORAL at 15:37

## 2022-10-11 RX ADMIN — ACETAMINOPHEN 1000 MG: 500 TABLET, FILM COATED ORAL at 12:18

## 2022-10-11 RX ADMIN — OXYCODONE HYDROCHLORIDE 10 MG: 5 TABLET ORAL at 14:53

## 2022-10-11 RX ADMIN — ONDANSETRON 4 MG: 2 INJECTION INTRAMUSCULAR; INTRAVENOUS at 13:54

## 2022-10-11 RX ADMIN — PROPOFOL 200 MG: 10 INJECTION, EMULSION INTRAVENOUS at 13:12

## 2022-10-11 RX ADMIN — FENTANYL CITRATE 50 MCG: 50 INJECTION, SOLUTION INTRAMUSCULAR; INTRAVENOUS at 13:12

## 2022-10-11 RX ADMIN — Medication 140 MG: at 13:12

## 2022-10-11 RX ADMIN — HYDROMORPHONE HYDROCHLORIDE 0.2 MG: 1 INJECTION, SOLUTION INTRAMUSCULAR; INTRAVENOUS; SUBCUTANEOUS at 13:29

## 2022-10-11 RX ADMIN — MIDAZOLAM HYDROCHLORIDE 2 MG: 1 INJECTION, SOLUTION INTRAMUSCULAR; INTRAVENOUS at 12:30

## 2022-10-11 RX ADMIN — SODIUM CHLORIDE, POTASSIUM CHLORIDE, SODIUM LACTATE AND CALCIUM CHLORIDE: 600; 310; 30; 20 INJECTION, SOLUTION INTRAVENOUS at 12:24

## 2022-10-11 RX ADMIN — DEXAMETHASONE SODIUM PHOSPHATE 10 MG: 10 INJECTION INTRAMUSCULAR; INTRAVENOUS at 13:54

## 2022-10-11 RX ADMIN — PROPOFOL 50 MG: 10 INJECTION, EMULSION INTRAVENOUS at 13:26

## 2022-10-11 RX ADMIN — FENTANYL CITRATE 50 MCG: 50 INJECTION, SOLUTION INTRAMUSCULAR; INTRAVENOUS at 13:07

## 2022-10-11 RX ADMIN — HYDROMORPHONE HYDROCHLORIDE 0.5 MG: 1 INJECTION, SOLUTION INTRAMUSCULAR; INTRAVENOUS; SUBCUTANEOUS at 14:38

## 2022-10-11 RX ADMIN — HYDROMORPHONE HYDROCHLORIDE 0.5 MG: 1 INJECTION, SOLUTION INTRAMUSCULAR; INTRAVENOUS; SUBCUTANEOUS at 14:43

## 2022-10-11 RX ADMIN — LIDOCAINE HYDROCHLORIDE 100 MG: 20 INJECTION, SOLUTION EPIDURAL; INFILTRATION; INTRACAUDAL; PERINEURAL at 13:12

## 2022-10-11 RX ADMIN — HYDROMORPHONE HYDROCHLORIDE 0.2 MG: 1 INJECTION, SOLUTION INTRAMUSCULAR; INTRAVENOUS; SUBCUTANEOUS at 13:52

## 2022-10-11 ASSESSMENT — PAIN SCALES - GENERAL
PAINLEVEL_OUTOF10: 5
PAINLEVEL_OUTOF10: 7
PAINLEVEL_OUTOF10: 8
PAINLEVEL_OUTOF10: 5
PAINLEVEL_OUTOF10: 2
PAINLEVEL_OUTOF10: 9
PAINLEVEL_OUTOF10: 7
PAINLEVEL_OUTOF10: 5
PAINLEVEL_OUTOF10: 6
PAINLEVEL_OUTOF10: 3

## 2022-10-11 ASSESSMENT — PAIN DESCRIPTION - DESCRIPTORS
DESCRIPTORS: ACHING;BURNING
DESCRIPTORS: ACHING;BURNING
DESCRIPTORS: ACHING;SORE
DESCRIPTORS: ACHING;BURNING;SORE
DESCRIPTORS: SORE

## 2022-10-11 ASSESSMENT — PAIN DESCRIPTION - LOCATION
LOCATION: NOSE;THROAT
LOCATION: THROAT
LOCATION: NOSE;THROAT

## 2022-10-11 ASSESSMENT — PAIN DESCRIPTION - FREQUENCY: FREQUENCY: CONTINUOUS

## 2022-10-11 ASSESSMENT — PAIN DESCRIPTION - PAIN TYPE: TYPE: SURGICAL PAIN

## 2022-10-11 ASSESSMENT — PAIN - FUNCTIONAL ASSESSMENT: PAIN_FUNCTIONAL_ASSESSMENT: 0-10

## 2022-10-11 NOTE — ANESTHESIA PRE PROCEDURE
Department of Anesthesiology  Preprocedure Note       Name:  Federico Severance   Age:  30 y.o.  :  1985                                          MRN:  [id number]         Date:  10/11/2022      Surgeon: Abdifatah Rogers):  Miles Fox DO    Procedure: Procedure(s):  SEPTOPLASTY GRAFT  INFERIOR TURBINATE REDUCTION  SINUS ENDOSCOPY LEFT MAXILARY TISSUE REMOVAL USING BALLOONS/ LEFT ROSS BULLOSA EXCISION  TONSILLECTOMY    Medications prior to admission:   Prior to Admission medications    Medication Sig Start Date End Date Taking? Authorizing Provider   escitalopram (LEXAPRO) 20 MG tablet Take 1 tablet by mouth daily For anxiety/depression prevention 10/4/22   Chris Ebbing, APRN - CNP   traZODone (DESYREL) 50 MG tablet Take 1/2 to 1 tablet orally at bedtime as needed for sleep 10/4/22   Chris Ebbing, APRN - CNP   buPROPion (WELLBUTRIN XL) 300 MG extended release tablet Take 1 tablet by mouth every morning With food 22   Chris Ebbing, APRN - CNP   vitamin D (ERGOCALCIFEROL) 1.25 MG (76451 UT) CAPS capsule TAKE 1 CAPSULE BY MOUTH EVERY SEVEN (7) DAYS. WITH FOOD 22   Chris Ebbing, APRN - CNP   montelukast (SINGULAIR) 10 MG tablet Take 1 tablet by mouth nightly For allergies/postnasal drip 22   Chris Ebbing, APRN - CNP   SLYND 4 MG TABS Take 1 tablet by mouth every morning 22   Historical Provider, MD   azelastine (ASTELIN) 0.1 % nasal spray 1 SPRAY BY BOTH NOSTRILS ROUTE TWO (2) TIMES A DAY.  USE IN EACH NOSTRIL AS DIRECTED 22   Historical Provider, MD   omeprazole (PRILOSEC) 40 MG delayed release capsule Take 1 capsule by mouth daily At bedtime for stomach/reflux 22   Chris Ebbing, APRN - CNP   tiZANidine (ZANAFLEX) 4 MG tablet Take 1 tablet by mouth nightly as needed (Muscle Spasm) May cause drowsiness 22   Chris Ebbing, APRN - CNP   Rimegepant Sulfate (NURTEC) 75 MG TBDP Take 75 mg by mouth daily as needed 3/23/22   Ar Automatic Reconciliation       Current medications:    Current Facility-Administered Medications   Medication Dose Route Frequency Provider Last Rate Last Admin    acetaminophen (TYLENOL) tablet 1,000 mg  1,000 mg Oral Once Adele De La Fuente MD        fentaNYL (SUBLIMAZE) injection 100 mcg  100 mcg IntraVENous Once PRN Adele De La Fuente MD        lactated ringers infusion   IntraVENous Continuous Adele De La Fuente MD        sodium chloride flush 0.9 % injection 5-40 mL  5-40 mL IntraVENous 2 times per day Adele De La Fuente MD        sodium chloride flush 0.9 % injection 5-40 mL  5-40 mL IntraVENous PRN Adele De La Fuente MD        midazolam PF (VERSED) injection 2 mg  2 mg IntraVENous Once PRN Adele De La Fuente MD           Allergies: Allergies   Allergen Reactions    Sertraline Anxiety     Worsening symptoms with jaw clenching       Problem List:    Patient Active Problem List   Diagnosis Code    Back pain M54.9    Throat pain R07.0    Throat infection J02.9    Migraine with aura G43.109       Past Medical History:  History reviewed. No pertinent past medical history.     Past Surgical History:        Procedure Laterality Date    CHOLECYSTECTOMY      GYN         Social History:    Social History     Tobacco Use    Smoking status: Former    Smokeless tobacco: Never   Substance Use Topics    Alcohol use: Yes     Comment: occasionally                                Counseling given: Not Answered      Vital Signs (Current):   Vitals:    10/06/22 1013 10/11/22 1200   BP:  129/82   Pulse:  (!) 104   Resp:  18   Temp:  98.1 °F (36.7 °C)   TempSrc:  Temporal   SpO2:  97%   Weight: 215 lb (97.5 kg) 215 lb 12.8 oz (97.9 kg)   Height: 5' 8.5\" (1.74 m)                                               BP Readings from Last 3 Encounters:   10/11/22 129/82   07/15/22 118/72   05/27/22 132/70       NPO Status:                                                                                 BMI:   Wt Readings from Last 3 Encounters:   10/11/22 215 lb 12.8 oz (97.9 kg)   07/15/22 213 lb (96.6 kg)   05/27/22 211 lb 6.4 oz (95.9 kg)     Body mass index is 32.34 kg/m². CBC:   Lab Results   Component Value Date/Time    MCV 87.5 05/19/2022 08:25 AM       CMP:   Lab Results   Component Value Date/Time     05/19/2022 11:03 AM    K 4.3 05/19/2022 11:03 AM     05/19/2022 11:03 AM    CO2 23 05/19/2022 11:03 AM    BUN 9 05/19/2022 11:03 AM    CREATININE 0.96 05/19/2022 11:03 AM    GFRAA 98 10/27/2021 10:16 AM    AGRATIO 1.5 05/19/2022 11:03 AM    GLUCOSE 74 05/19/2022 11:03 AM    PROT 7.1 05/19/2022 11:03 AM    CALCIUM 9.7 05/19/2022 11:03 AM    BILITOT 0.2 05/19/2022 11:03 AM    ALKPHOS 67 05/19/2022 11:03 AM    AST 12 05/19/2022 11:03 AM    ALT 18 05/19/2022 11:03 AM       POC Tests: No results for input(s): POCGLU, POCNA, POCK, POCCL, POCBUN, POCHEMO, POCHCT in the last 72 hours. Coags: No results found for: PROTIME, INR, APTT    HCG (If Applicable): No results found for: PREGTESTUR, PREGSERUM, HCG, HCGQUANT     ABGs: No results found for: PHART, PO2ART, EEM0FNH, MTV6PNH, BEART, C3BUITKV     Type & Screen (If Applicable):  No results found for: LABABO, LABRH    Drug/Infectious Status (If Applicable):  No results found for: HIV, HEPCAB    COVID-19 Screening (If Applicable): No results found for: COVID19        Anesthesia Evaluation  Patient summary reviewed and Nursing notes reviewed  Airway: Mallampati: II  TM distance: >3 FB   Neck ROM: full  Mouth opening: > = 3 FB   Dental: normal exam         Pulmonary:Negative Pulmonary ROS and normal exam  breath sounds clear to auscultation                             Cardiovascular:Negative CV ROS  Exercise tolerance: good (>4 METS),           Rhythm: regular  Rate: normal                    Neuro/Psych:   (+) headaches:,             GI/Hepatic/Renal: Neg GI/Hepatic/Renal ROS            Endo/Other: Negative Endo/Other ROS                    Abdominal:             Vascular: negative vascular ROS. Other Findings:           Anesthesia Plan      general     ASA 2       Induction: intravenous. Anesthetic plan and risks discussed with patient.                         Phyllis Hicks MD   10/11/2022

## 2022-10-11 NOTE — DISCHARGE INSTRUCTIONS
Things to Remember After Surgery    1. Sleep and rest with head elevated on several pillows to decrease swelling and pressure. 2. You will have a 2 X 2 gauze under your nose to absorb the bloody discharge you will have the first 2 to 3 days after surgery. If you saturate the gauze more than 3 times in 30 minutes, please notify the office. 3. Numbness to the front teeth after nasal surgery is normal. The feeling usually returns in 6 to 8 weeks. 4. Clean the end of your nose with hydrogen peroxide. Do not try to clean inside. It may cause bleeding. 5. Do not blow your nose until you have had your first post-op appointment ( at least one week after surgery). 6. For a mild pain and a low-grade temperature, you may take Tylenol. No aspirin, Motrin, Advil, or Aleve for at least 3 weeks after nasal surgery. 7. If the nasal surgery requires an exterior cast and the cast falls off, please notify the office during office hours. 8. Nasal congestion after surgery is normal and will resolve after the splints and/or packing are removed. ACTIVITY   Bathe or shower as directed by your doctor. Avoid strenuous work and becoming overheated. Activity as directed by your doctor   Avoid bending over. DIET   Clear, cool liquids the first day; then soft diet the second day   Advance to regular diet on third day, unless your doctor orders otherwise. No milk products or foods with red color dyes   If nausea and vomiting continues, call your doctor. PAIN   Take pain medication as directed by your doctor. Call your doctor if pain is NOT relieved by medication. DO NOT take aspirin or blood thinners until directed by your doctor. FOLLOW-UP PHONE [phone] will be made by nursing staff   If you have any problems, call your doctor as needed. CALL YOUR DOCTOR IF   Bleeding is expected the first few days and should gradually clear.    Temperature of 10 1°F or above   Green or yellow discharge from nose   Stiff neck, changes in vision or mental status, confusion, or excessive drowsiness

## 2022-10-11 NOTE — ANESTHESIA POSTPROCEDURE EVALUATION
Department of Anesthesiology  Postprocedure Note    Patient: Yarely Barraza  MRN: [id number]  YOB: 1985  Date of evaluation: 10/11/2022      Procedure Summary     Date: 10/11/22 [place] [facility] [facility]  [place] [facility]    Anesthesia Start: 7587 Anesthesia Stop: 0903    Procedures:       SEPTOPLASTY GRAFT (Nose)      INFERIOR TURBINATE REDUCTION (Bilateral: Nose)      SINUS ENDOSCOPY LEFT MAXILARY TISSUE REMOVAL USING BALLOONS/ LEFT ROSS BULLOSA EXCISION (Bilateral: Nose)      TONSILLECTOMY (Throat) Diagnosis:       Chronic sinusitis, unspecified location      Acute recurrent sinusitis, unspecified location      Deviated nasal septum      Hypertrophy of nasal turbinates      Other specified disorders of nose and nasal sinuses      Acute recurrent streptococcal tonsillitis      Non-seasonal allergic rhinitis, unspecified trigger      Mouth breathing      (Chronic sinusitis, unspecified location [J32.9])      (Acute recurrent sinusitis, unspecified location [J01.91])      (Deviated nasal septum [J34.2])      (Hypertrophy of nasal turbinates [J34.3])      (Other specified disorders of nose and nasal sinuses [J34.89])      (Acute recurrent streptococcal tonsillitis [J03.01])      (Non-seasonal allergic rhinitis, unspecified trigger [J30.89])      (Mouth breathing [R06.5])    Surgeons: Lorie Nieto DO Responsible Provider: Tima Alarcon MD    Anesthesia Type: general ASA Status: 2          Anesthesia Type: No value filed.     Ottoniel Phase I: Ottoniel Score: 8    Ottoniel Phase II:        Anesthesia Post Evaluation    Patient location during evaluation: PACU  Patient participation: complete - patient participated  Level of consciousness: awake and alert  Airway patency: patent  Nausea & Vomiting: no nausea and no vomiting  Complications: no  Cardiovascular status: hemodynamically stable  Respiratory status: acceptable, nonlabored ventilation and spontaneous ventilation  Hydration status: euvolemic  Comments: /82   Pulse 81   Temp 97.2 °F (36.2 °C) (Temporal)   Resp 16   Ht 5' 8.5\" (1.74 m)   Wt 215 lb 12.8 oz (97.9 kg)   LMP 09/28/2022   SpO2 98%   BMI 32.34 kg/m²     Multimodal analgesia pain management approach

## 2022-10-12 NOTE — OP NOTE
[place]  OPERATIVE REPORT    Name:  Prabhakar Thibodeaux  MR#:  [id number]  :  1985  ACCOUNT #:  [de-identified]  DATE OF SERVICE:  10/11/2022    PREOPERATIVE DIAGNOSES:  Deviated nasal septum, inferior turbinate hypertrophy, nasal obstruction, chronic sinusitis, chronic tonsillitis and tonsillar hypertrophy. POSTOPERATIVE DIAGNOSES:  Deviated nasal septum, inferior turbinate hypertrophy, nasal obstruction, chronic sinusitis, chronic tonsillitis and tonsillar hypertrophy. PROCEDURES PERFORMED:  Tonsillectomy, septoplasty, bilateral submucosal resection of the inferior turbinates, left maxillary balloon sinuplasty with removal of contents, and excision of left muna bullosa. SURGEON:  [place]. DO Hayes    ASSISTANT:  None. ANESTHESIA:  General.    COMPLICATIONS:  None. SPECIMENS REMOVED:  None. IMPLANTS:  Bilateral Stewart splints. ESTIMATED BLOOD LOSS:  100 mL. HISTORY:  This is a 29-year-old female who presented because of feeling of chronic strep throat. Her throat is always sore. She always feels like there is an infection since it never goes away. She does take Augmentin on a regular basis to try to clear that up and that does seem to make it feel temporarily better until she stops it and then it comes back. So, a lot of time she does get the sinus infection at the same time that she gets a tonsil infection, so she has a history of recurrent sinus infections and the only thing again that helps it is the antibiotic along with an oral steroid. She has had trouble breathing through her nose for years. The right side is always worse than the left, but there is bilateral congestion every single day. She gets headaches, postnasal drip and she is a chronic mouth breather. She is currently using Astelin and Flonase and does not feel that they help and she also feels like she maintains a lot of ear pressure.   Physical exam does reveal a severely deviated nasal septum to the left.  She has also a deviation to the caudal portion of the septal cartilage. She has severe turbinate hypertrophy on both sides. She did have a CT scan done which showed infection and cyst within the left maxillary sinus and her tonsils were 3+ and cryptic. So, based on the history and physical exam, it was my recommendation that she undergo a septoplasty, turbinate reduction, left maxillary balloon sinuplasty with possible biopsy, excision of the left muna bullosa that she also had on her CT scan and a tonsillectomy. Procedure risks and benefits were discussed with her in the office, all questions were answered and she was agreeable to the surgery. SURGERY DETAILS:  The patient was identified in the preoperative waiting area, taken back to the operating room where she underwent general anesthesia. Approximately 3 mL of 1% lidocaine with epinephrine were injected into the inferior and middle turbinates along with the septum bilaterally. Afrin-soaked pledgets were placed in each side of the nose and left there during the oral portion of the procedure. The bed was turned 90 degrees. Elaine-Alexey mouth gag was inserted and opened. A curved Allis was used to medialize the left tonsil which was removed using the Bovie and the tonsillar fossa was cauterized using suction cautery. There was minimal bleeding from the left. Same thing was done on the right. A curved Allis was used to medialize the right tonsil which was removed using the Bovie and the tonsillar fossa was cauterized using suction cautery and there was minimal bleeding from the right side. So, once there was good hemostasis, Elaine-Alexey mouth gag was released and removed. The bed was turned back to its original position. Pledgets were removed from each side of the nose. A right-sided hemitransfixion incision was made in the anterior septum. A left-sided submucoperichondrial and submucoperiosteal flap were then raised.   I came back to the bony cartilaginous junction, broke through that, and raised a right-sided submucoperiosteal flap. Deviated portions of the nasal septum were both bony and cartilaginous and also involved the maxillary crest.  Using a combination of [place] elevator, Mesfin forceps, open Elon-Zamorano, and hammer and chisel, I was able to isolate and remove the deviated portions of the nasal septum. A 4-0 chromic was used to reapproximate the septal flaps back in the midline position and this was also used to close the hemitransfixion incision. A knife was used to make a small stab incision in the anterior portion of the inferior turbinates. Alpa Tyler was used to create a small pocket between the bone of the inferior turbinate and the mucosa medially and then a microdebrider with a turbinate blade was used to reduce the prominent bone and tissue of the inferior turbinates bilaterally. Boies elevator was used to medialize and lateralize the inferior turbinates and this gave the patient a widely patent nasal airway. I then used a Claflin to medialize and collapse the muna bullosa on the left middle turbinate. A 4 mm microdebrider blade was then used to excise the lateral portion of the middle turbinate opening and excising the muna bullosa. At that point, the introducer for the balloon sinuplasty kit for the maxillary sinus was used. I was able to feed the guidewire and balloon into the left maxillary sinus. The balloon was inflated to a pressure of 12 and deflated. The sinus was irrigated using 200 mL of saline. I was able to wash out a lot of purulent debris and reinspection of the inside of the sinus revealed a large cyst seen along the floor, which I was able to remove.   The Stewart splints with antibiotic ointment on them were placed in each side of the nose and then sewn in place anteriorly using a single nylon stitch and then the patient was awakened and taken to the postop recovery room in a stable condition.       Polo Brewer DO      [id number]  D:  10/11/2022 14:24  T:  10/12/2022 1:49  JOB #:  [id number]

## 2022-11-23 ENCOUNTER — TELEMEDICINE (OUTPATIENT)
Dept: FAMILY MEDICINE CLINIC | Facility: CLINIC | Age: 37
End: 2022-11-23
Payer: COMMERCIAL

## 2022-11-23 DIAGNOSIS — F32.A DEPRESSION, UNSPECIFIED DEPRESSION TYPE: ICD-10-CM

## 2022-11-23 DIAGNOSIS — M54.6 CHRONIC MIDLINE THORACIC BACK PAIN: ICD-10-CM

## 2022-11-23 DIAGNOSIS — G89.29 CHRONIC MIDLINE THORACIC BACK PAIN: ICD-10-CM

## 2022-11-23 DIAGNOSIS — F41.1 GENERALIZED ANXIETY DISORDER: Primary | ICD-10-CM

## 2022-11-23 DIAGNOSIS — E55.9 VITAMIN D DEFICIENCY: ICD-10-CM

## 2022-11-23 DIAGNOSIS — G43.009 MIGRAINE WITHOUT AURA AND WITHOUT STATUS MIGRAINOSUS, NOT INTRACTABLE: ICD-10-CM

## 2022-11-23 DIAGNOSIS — Z11.4 SCREENING FOR HUMAN IMMUNODEFICIENCY VIRUS: ICD-10-CM

## 2022-11-23 DIAGNOSIS — Z00.00 LABORATORY TESTS ORDERED AS PART OF A COMPLETE PHYSICAL EXAM (CPE): ICD-10-CM

## 2022-11-23 DIAGNOSIS — F51.01 PRIMARY INSOMNIA: ICD-10-CM

## 2022-11-23 PROCEDURE — 99214 OFFICE O/P EST MOD 30 MIN: CPT | Performed by: NURSE PRACTITIONER

## 2022-11-23 RX ORDER — RIMEGEPANT SULFATE 75 MG/75MG
75 TABLET, ORALLY DISINTEGRATING ORAL DAILY PRN
Qty: 30 TABLET | Refills: 11 | Status: SHIPPED | OUTPATIENT
Start: 2022-11-23

## 2022-11-23 RX ORDER — OMEPRAZOLE 40 MG/1
40 CAPSULE, DELAYED RELEASE ORAL DAILY
Qty: 90 CAPSULE | Refills: 3 | Status: SHIPPED | OUTPATIENT
Start: 2022-11-23

## 2022-11-23 RX ORDER — ESCITALOPRAM OXALATE 20 MG/1
20 TABLET ORAL DAILY
Qty: 90 TABLET | Refills: 3 | Status: SHIPPED | OUTPATIENT
Start: 2022-11-23

## 2022-11-23 RX ORDER — TRAZODONE HYDROCHLORIDE 50 MG/1
TABLET ORAL
Qty: 90 TABLET | Refills: 3 | Status: SHIPPED | OUTPATIENT
Start: 2022-11-23

## 2022-11-23 RX ORDER — FLUOXETINE 10 MG/1
CAPSULE ORAL
Qty: 90 CAPSULE | OUTPATIENT
Start: 2022-11-23

## 2022-11-23 RX ORDER — TIZANIDINE 4 MG/1
4 TABLET ORAL
Qty: 30 TABLET | Refills: 5 | Status: SHIPPED | OUTPATIENT
Start: 2022-11-23

## 2022-11-23 RX ORDER — BUPROPION HYDROCHLORIDE 300 MG/1
300 TABLET ORAL EVERY MORNING
Qty: 90 TABLET | Refills: 3 | Status: SHIPPED | OUTPATIENT
Start: 2022-11-23

## 2022-11-23 ASSESSMENT — ENCOUNTER SYMPTOMS
GASTROINTESTINAL NEGATIVE: 1
EYES NEGATIVE: 1
FACIAL SWELLING: 0
ALLERGIC/IMMUNOLOGIC NEGATIVE: 1
BACK PAIN: 0
RESPIRATORY NEGATIVE: 1
SORE THROAT: 0
COUGH: 0
RECTAL PAIN: 0
CONSTIPATION: 0
SINUS PAIN: 0
STRIDOR: 0
WHEEZING: 0
RHINORRHEA: 0
EYE DISCHARGE: 0
ABDOMINAL PAIN: 0
CHEST TIGHTNESS: 0
EYE REDNESS: 0
APNEA: 0
NAUSEA: 0
EYE PAIN: 0
CHOKING: 0
TROUBLE SWALLOWING: 0
COLOR CHANGE: 0
SINUS PRESSURE: 0
ANAL BLEEDING: 0
ABDOMINAL DISTENTION: 0
PHOTOPHOBIA: 0
BLOOD IN STOOL: 0
SHORTNESS OF BREATH: 0
VOICE CHANGE: 0
EYE ITCHING: 0
DIARRHEA: 0
VOMITING: 0

## 2022-11-23 NOTE — PROGRESS NOTES
PROGRESS NOTE        Consent: This patient and/or their healthcare decision maker is aware that this patient-initiated Telehealth encounter is a billable service, with coverage as determined by their insurance carrier. Patient is aware that they may receive a bill and has provided verbal consent to proceed: Yes    I was in the officewhile conducting this encounter. Patient was at home. This virtual visit was conducted via [employer]. Pursuant to the emergency declaration under the [facility], [facility] waiver authority and the [employer] and [employer] Act, this Virtual  Visit was conducted to reduce the patient's risk of exposure to COVID-19 and provide continuity of care for an established patient. Services were provided through a video synchronous discussion virtually to substitute for in-person clinic visit. Due to this being a TeleHealth evaluation, many elements of the physical examination are unable to be assessed. On this date 11/23/2022 I have spent 30 minutes reviewing previous notes, test results and face to face (virtual) with the patient discussing the diagnosis and importance of compliance with the treatment plan as well as documenting on the day of the visit. . Greater than 50% of this visit was spent counseling the patient about test results, prognosis, importance of compliance, education about disease process, benefits of medications, instructions for management of acute symptoms, and follow up plans. Chief Complaint   Patient presents with    Follow-up       SUBJECTIVE:     Ruiz Vergara is a very pleasant 30 y.o. female , with past medical history significant for  anxiety, migraine headache, seasonal allergies, recurrent strep throat, tonsillitis and sinusitis - currently following ENT, vitamin D deficiency, GERD, back pain, and arthritis, seen virtually regarding follow-up.   Patient is s/p septoplasty graft with inferior turbinate reduction and tonsillectomy since 10/11. Patient reports that she had significant congestion and discomfort for the first 3 weeks but reports that she is feeling much better now. She is able to breathe better. She also reports that Lexapro, Wellbutrin regimen are working well for her anxiety/depression management. She reports trazodone also provide great results for insomnia. Past Medical History, Past Surgical History, Family history, Social History, and Medications were all reviewed with the patient today and updated as necessary. Current Outpatient Medications   Medication Sig Dispense Refill    buPROPion (WELLBUTRIN XL) 300 MG extended release tablet Take 1 tablet by mouth every morning With food 90 tablet 3    escitalopram (LEXAPRO) 20 MG tablet Take 1 tablet by mouth daily For anxiety/depression prevention 90 tablet 3    omeprazole (PRILOSEC) 40 MG delayed release capsule Take 1 capsule by mouth daily At bedtime for stomach/reflux 90 capsule 3    tiZANidine (ZANAFLEX) 4 MG tablet Take 1 tablet by mouth nightly as needed (Muscle Spasm) May cause drowsiness 30 tablet 5    traZODone (DESYREL) 50 MG tablet Take 1/2 to 1 tablet orally at bedtime as needed for sleep 90 tablet 3    Rimegepant Sulfate (NURTEC) 75 MG TBDP Take 75 mg by mouth daily as needed (migraine) 30 tablet 11    vitamin D (ERGOCALCIFEROL) 1.25 MG (06067 UT) CAPS capsule TAKE 1 CAPSULE BY MOUTH EVERY SEVEN (7) DAYS. WITH FOOD 5 capsule 2    montelukast (SINGULAIR) 10 MG tablet Take 1 tablet by mouth nightly For allergies/postnasal drip 30 tablet 5    SLYND 4 MG TABS Take 1 tablet by mouth every morning      azelastine (ASTELIN) 0.1 % nasal spray 1 SPRAY BY BOTH NOSTRILS ROUTE TWO (2) TIMES A DAY. USE IN EACH NOSTRIL AS DIRECTED       No current facility-administered medications for this visit.      Allergies   Allergen Reactions    Sertraline Anxiety     Worsening symptoms with jaw clenching     Patient Active Problem List   Diagnosis    Back pain    Throat pain    Throat infection    Migraine with aura     History reviewed. No pertinent past medical history. Past Surgical History:   Procedure Laterality Date    CHOLECYSTECTOMY      GYN      NOSE SURGERY Bilateral 10/11/2022    INFERIOR TURBINATE REDUCTION performed by Elaine Rivero DO at [facility]    SEPTOPLASTY N/A 10/11/2022    SEPTOPLASTY GRAFT performed by Elaine Rivero DO at [address] [place] [address] Street Bilateral 10/11/2022    SINUS ENDOSCOPY LEFT MAXILARY TISSUE REMOVAL USING BALLOONS/ LEFT ROSS BULLOSA EXCISION performed by Elaine Rivero DO at [address] [place] [address] N/A 10/11/2022    TONSILLECTOMY performed by Elaine Rivero DO at [facility] MAIN OR     Family History   Problem Relation Age of Onset    No Known Problems Mother     No Known Problems Father      Social History     Tobacco Use    Smoking status: Former    Smokeless tobacco: Never   Substance Use Topics    Alcohol use: Yes     Comment: occasionally         Review of Systems   Constitutional: Negative. Negative for activity change, appetite change, chills, diaphoresis, fatigue, fever and unexpected weight change. HENT: Negative. Negative for congestion, dental problem, drooling, ear discharge, ear pain, facial swelling, hearing loss, mouth sores, nosebleeds, postnasal drip, rhinorrhea, sinus pressure, sinus pain, sneezing, sore throat, tinnitus, trouble swallowing and voice change. Eyes: Negative. Negative for photophobia, pain, discharge, redness, itching and visual disturbance. Respiratory: Negative. Negative for apnea, cough, choking, chest tightness, shortness of breath, wheezing and stridor. Cardiovascular: Negative. Negative for chest pain, palpitations and leg swelling. Gastrointestinal: Negative. Negative for abdominal distention, abdominal pain, anal bleeding, blood in stool, constipation, diarrhea, nausea, rectal pain and vomiting.    Endocrine: Negative. Negative for cold intolerance, heat intolerance, polydipsia, polyphagia and polyuria. Genitourinary: Negative. Negative for decreased urine volume, difficulty urinating, dysuria, enuresis, flank pain, frequency, genital sores, hematuria and urgency. Musculoskeletal: Negative. Negative for arthralgias, back pain, gait problem, joint swelling, myalgias, neck pain and neck stiffness. Skin: Negative. Negative for color change, pallor, rash and wound. Allergic/Immunologic: Negative. Negative for environmental allergies, food allergies and immunocompromised state. Neurological: Negative. Negative for dizziness, tremors, seizures, syncope, facial asymmetry, speech difficulty, weakness, light-headedness, numbness and headaches. Hematological: Negative. Negative for adenopathy. Does not bruise/bleed easily. Psychiatric/Behavioral:  Positive for dysphoric mood (Controlled) and sleep disturbance (Controlled). Negative for agitation, behavioral problems, confusion, decreased concentration, hallucinations, self-injury and suicidal ideas. The patient is nervous/anxious (Controlled). The patient is not hyperactive. OBJECTIVE:    Patient's vital signs were not performed as encounter was performed virtually. Location of virtual visit was patient's home. Physical Exam  Constitutional:       General: She is not in acute distress. Appearance: Normal appearance. She is not toxic-appearing or diaphoretic. HENT:      Head: Normocephalic and atraumatic. Neurological:      General: No focal deficit present. Mental Status: She is alert and oriented to person, place, and time. Psychiatric:         Mood and Affect: Mood normal.         Behavior: Behavior normal.         Thought Content: Thought content normal.         Judgment: Judgment normal.        Medical problems and test results were reviewed with the patient today.    PHQ-9  11/23/2022 8/22/2022 7/15/2022   Little interest or pleasure in doing things 0 1 0   Little interest or pleasure in doing things - - -   Feeling down, depressed, or hopeless 0 1 0   Trouble falling or staying asleep, or sleeping too much - - -   Feeling tired or having little energy - - -   Poor appetite, weight loss, or overeating - - -   Feeling bad about yourself - or that you are a failure or have let yourself or your family down - - -   Trouble concentrating on things such as school, work, reading, or watching TV - - -   Moving or speaking so slowly that other people could have noticed; or the opposite being so fidgety that others notice - - -   Thoughts of being better off dead, or hurting yourself in some way - - -   PHQ-2 Score 0 2 0   Total Score PHQ 2 - - -   PHQ-9 Total Score 0 2 0   PHQ 9 Score - - -   How difficult have these problems made it for you to do your work, take care of your home and get along with others - - -      YURIDIA-7 SCREENING 11/23/2022 8/22/2022 11/3/2021   Feeling nervous, anxious, or on edge Several days Nearly every day -   Not being able to stop or control worrying Several days Several days -   Worrying too much about different things Several days More than half the days -   Trouble relaxing Not at all More than half the days -   Being so restless that it is hard to sit still Not at all Several days -   Becoming easily annoyed or irritable Not at all Nearly every day -   Feeling afraid as if something awful might happen Not at all Several days -   YURIDIA-7 Total Score 3 13 -   How difficult have these problems made it for you to do your work, take care of things at home, or get along with other people? Somewhat difficult Somewhat difficult Somewhat difficult   Feeling nervous, anxious, or on edge - - Several days   YURIDIA-7 Total Score - - 16        ASSESSMENT and PLAN    1. Generalized anxiety disorder  -     buPROPion (WELLBUTRIN XL) 300 MG extended release tablet;  Take 1 tablet by mouth every morning With food, Disp-90 tablet, R-3Normal  -     escitalopram (LEXAPRO) 20 MG tablet; Take 1 tablet by mouth daily For anxiety/depression prevention, Disp-90 tablet, R-3Normal  -     traZODone (DESYREL) 50 MG tablet; Take 1/2 to 1 tablet orally at bedtime as needed for sleep, Disp-90 tablet, R-3Normal    Meek score is now down to 3 from 13 in August and PHQ-9 score is a 0. Patient reports significant improvement in her symptom and no side effects with current regimen. We will have patient continue with current therapy at this time. She agrees to let us know if there were any worsening in symptoms or if she has any new needs or questions. 2. Depression, unspecified depression type  -     buPROPion (WELLBUTRIN XL) 300 MG extended release tablet; Take 1 tablet by mouth every morning With food, Disp-90 tablet, R-3Normal  -     escitalopram (LEXAPRO) 20 MG tablet; Take 1 tablet by mouth daily For anxiety/depression prevention, Disp-90 tablet, R-3Normal    As above. 3. Primary insomnia  -     traZODone (DESYREL) 50 MG tablet; Take 1/2 to 1 tablet orally at bedtime as needed for sleep, Disp-90 tablet, R-3Normal    As above. 4. Chronic midline thoracic back pain  -     tiZANidine (ZANAFLEX) 4 MG tablet; Take 1 tablet by mouth nightly as needed (Muscle Spasm) May cause drowsiness, Disp-30 tablet, R-5Normal    Stable at this time. Refilled tizanidine for as needed use. Sedation and safety precaution reviewed discussed with patient. 5. Migraine without aura and without status migrainosus, not intractable  -     Rimegepant Sulfate (NURTEC) 75 MG TBDP;  Take 75 mg by mouth daily as needed (migraine), Disp-30 tablet, R-11Normal    Orders Placed This Encounter   Procedures    Vitamin D 25 Hydroxy     Standing Status:   Future     Standing Expiration Date:   11/23/2023    Lipid Panel     Standing Status:   Future     Standing Expiration Date:   11/23/2023    Comprehensive Metabolic Panel     Standing Status:   Future     Standing Expiration Date: 11/23/2023    HIV 1/2 Ag/Ab, 4TH Generation,W Rflx Confirm     Standing Status:   Future     Standing Expiration Date:   11/23/2023    CBC with Auto Differential     Standing Status:   Future     Standing Expiration Date:   11/23/2023    TSH with Reflex     Standing Status:   Future     Standing Expiration Date:   11/23/2023    AMB POC URINALYSIS DIP STICK MANUAL W/O MICRO     Standing Status:   Future     Standing Expiration Date:   11/23/2023           Elements of this note have been dictated using speech recognition software. As a result, errors of speech recognition may have occurred. Return in about 6 months (around 5/23/2023) for Physical with fasting labs prior.      TIARA Mcadams - CNP

## 2023-01-18 ENCOUNTER — TELEMEDICINE (OUTPATIENT)
Dept: FAMILY MEDICINE CLINIC | Facility: CLINIC | Age: 38
End: 2023-01-18
Payer: COMMERCIAL

## 2023-01-18 DIAGNOSIS — F32.A DEPRESSION, UNSPECIFIED DEPRESSION TYPE: ICD-10-CM

## 2023-01-18 DIAGNOSIS — F41.1 GENERALIZED ANXIETY DISORDER: Primary | ICD-10-CM

## 2023-01-18 PROCEDURE — 99214 OFFICE O/P EST MOD 30 MIN: CPT | Performed by: NURSE PRACTITIONER

## 2023-01-18 NOTE — PROGRESS NOTES
PROGRESS NOTE        Consent: This patient and/or their healthcare decision maker is aware that this patient-initiated Telehealth encounter is a billable service, with coverage as determined by their insurance carrier. Patient is aware that they may receive a bill and has provided verbal consent to proceed: Yes    I was in the officewhile conducting this encounter. Patient was at home. This virtual visit was conducted via [email or web address]. Pursuant to the emergency declaration under the [facility], [facility] waiver authority and the [employer] Act, this Virtual  Visit was conducted to reduce the patient's risk of exposure to COVID-19 and provide continuity of care for an established patient. Services were provided through a video synchronous discussion virtually to substitute for in-person clinic visit. Due to this being a TeleHealth evaluation, many elements of the physical examination are unable to be assessed. On this date 1/18/2023 I have spent 30 minutes reviewing previous notes, test results and face to face (virtual) with the patient discussing the diagnosis and importance of compliance with the treatment plan as well as documenting on the day of the visit. . Greater than 50% of this visit was spent counseling the patient about test results, prognosis, importance of compliance, education about disease process, benefits of medications, instructions for management of acute symptoms, and follow up plans. Chief Complaint   Patient presents with    Follow-up     States that Lexapro does not work as well as it has in the past and would like to know if she could take zoloft with wellbutrin.         SUBJECTIVE:     Luz Browne is a very pleasant 30 y.o. female, with past medical history significant for  anxiety, migraine headache, seasonal allergies, recurrent strep throat, tonsillitis and sinusitis - currently following ENT, vitamin D deficiency, GERD, back pain, and arthritis, seen virtually regarding anxiety management. Pt is currently on lexapro and wellbutrin. Reports meds were helping but not any longer. Pt would like to try zoloft again as medication helped but was causing her to have some jaw clenching. She would like take zoloft with wellbutrin. Pt reports no thoughts or plans of suicide, self harm or homicide. Past Medical History, Past Surgical History, Family history, Social History, and Medications were all reviewed with the patient today and updated as necessary. Current Outpatient Medications   Medication Sig Dispense Refill    sertraline (ZOLOFT) 50 MG tablet Take 1/2 tablet with food orally once daily for 1 week then increase to 1 tablet with food orally daily afterwards 30 tablet 1    buPROPion (WELLBUTRIN XL) 300 MG extended release tablet Take 1 tablet by mouth every morning With food 90 tablet 3    tiZANidine (ZANAFLEX) 4 MG tablet Take 1 tablet by mouth nightly as needed (Muscle Spasm) May cause drowsiness 30 tablet 5    traZODone (DESYREL) 50 MG tablet Take 1/2 to 1 tablet orally at bedtime as needed for sleep 90 tablet 3    Rimegepant Sulfate (NURTEC) 75 MG TBDP Take 75 mg by mouth daily as needed (migraine) 30 tablet 11    vitamin D (ERGOCALCIFEROL) 1.25 MG (95693 UT) CAPS capsule TAKE 1 CAPSULE BY MOUTH EVERY SEVEN (7) DAYS. WITH FOOD 5 capsule 2    SLYND 4 MG TABS Take 1 tablet by mouth every morning       No current facility-administered medications for this visit. Allergies   Allergen Reactions    Sertraline Anxiety     Worsening symptoms with jaw clenching     Patient Active Problem List   Diagnosis    Back pain    Throat pain    Throat infection    Migraine with aura     History reviewed. No pertinent past medical history.   Past Surgical History:   Procedure Laterality Date    CHOLECYSTECTOMY      GYN      NOSE SURGERY Bilateral 10/11/2022    INFERIOR TURBINATE REDUCTION performed by Susana Lefort, DO at [facility]    SEPTOPLASTY N/A 10/11/2022    SEPTOPLASTY GRAFT performed by Susana Lefort, DO at [address] Bilateral 10/11/2022    SINUS ENDOSCOPY LEFT MAXILARY TISSUE REMOVAL USING BALLOONS/ LEFT ROSS BULLOSA EXCISION performed by Susana Lefort, DO at [address] [place] [address] Drive N/A 10/11/2022    TONSILLECTOMY performed by Susana Lefort, DO at [facility] MAIN OR     Family History   Problem Relation Age of Onset    No Known Problems Mother     No Known Problems Father      Social History     Tobacco Use    Smoking status: Former    Smokeless tobacco: Never   Substance Use Topics    Alcohol use: Yes     Comment: occasionally         Review of Systems   Constitutional: Negative. Negative for activity change, appetite change, chills, diaphoresis, fatigue, fever and unexpected weight change. HENT: Negative. Negative for congestion, dental problem, drooling, ear discharge, ear pain, facial swelling, hearing loss, mouth sores, nosebleeds, postnasal drip, rhinorrhea, sinus pressure, sinus pain, sneezing, sore throat, tinnitus, trouble swallowing and voice change. Eyes: Negative. Negative for photophobia, pain, discharge, redness, itching and visual disturbance. Respiratory: Negative. Negative for apnea, cough, choking, chest tightness, shortness of breath, wheezing and stridor. Cardiovascular: Negative. Negative for chest pain, palpitations and leg swelling. Gastrointestinal: Negative. Negative for abdominal distention, abdominal pain, anal bleeding, blood in stool, constipation, diarrhea, nausea, rectal pain and vomiting. Endocrine: Negative. Negative for cold intolerance, heat intolerance, polydipsia, polyphagia and polyuria. Genitourinary: Negative. Negative for decreased urine volume, difficulty urinating, dysuria, enuresis, flank pain, frequency, genital sores, hematuria and urgency. Musculoskeletal: Negative.   Negative for arthralgias, back pain, gait problem, joint swelling, myalgias, neck pain and neck stiffness. Skin: Negative. Negative for color change, pallor, rash and wound. Allergic/Immunologic: Negative. Negative for environmental allergies, food allergies and immunocompromised state. Neurological: Negative. Negative for dizziness, tremors, seizures, syncope, facial asymmetry, speech difficulty, weakness, light-headedness, numbness and headaches. Hematological: Negative. Negative for adenopathy. Does not bruise/bleed easily. Psychiatric/Behavioral:  Positive for dysphoric mood. Negative for agitation, behavioral problems, confusion, decreased concentration, hallucinations, self-injury, sleep disturbance and suicidal ideas. The patient is nervous/anxious. The patient is not hyperactive. OBJECTIVE:    Patient's vital signs were not performed as encounter was performed virtually. Location of virtual visit was patient's home. Physical Exam  Constitutional:       General: She is not in acute distress. Appearance: Normal appearance. She is not toxic-appearing or diaphoretic. HENT:      Head: Normocephalic and atraumatic. Neurological:      General: No focal deficit present. Mental Status: She is alert and oriented to person, place, and time. Psychiatric:         Mood and Affect: Mood normal.         Behavior: Behavior normal.         Thought Content: Thought content normal.         Judgment: Judgment normal.        Medical problems and test results were reviewed with the patient today. ASSESSMENT and PLAN    1. Generalized anxiety disorder  -     sertraline (ZOLOFT) 50 MG tablet;  Take 1/2 tablet with food orally once daily for 1 week then increase to 1 tablet with food orally daily afterwards, Disp-30 tablet, R-1Normal    We will have patient wean off of Lexapro by taking half a tablet of Lexapro in addition to half a tablet of Zoloft daily for 1 week with food before stopping Lexapro completely and increase Zoloft to 1 tablet fully. Follow-up in 2 weeks. Advised patient to let us know if she was to have any side effects or to have any more jaw clenching restart. 2. Depression, unspecified depression type  -     sertraline (ZOLOFT) 50 MG tablet; Take 1/2 tablet with food orally once daily for 1 week then increase to 1 tablet with food orally daily afterwards, Disp-30 tablet, R-1Normal    As above. Elements of this note have been dictated using speech recognition software. As a result, errors of speech recognition may have occurred. Return in about 2 weeks (around 2/1/2023) for Medication follow up (ok to be virtual).      Sissy Gibson, TIARA - CNP

## 2023-01-19 ASSESSMENT — ENCOUNTER SYMPTOMS
BACK PAIN: 0
DIARRHEA: 0
EYE DISCHARGE: 0
PHOTOPHOBIA: 0
ANAL BLEEDING: 0
EYE ITCHING: 0
SHORTNESS OF BREATH: 0
EYE REDNESS: 0
RECTAL PAIN: 0
GASTROINTESTINAL NEGATIVE: 1
RHINORRHEA: 0
CHOKING: 0
VOICE CHANGE: 0
ABDOMINAL PAIN: 0
COLOR CHANGE: 0
VOMITING: 0
STRIDOR: 0
WHEEZING: 0
EYE PAIN: 0
NAUSEA: 0
CONSTIPATION: 0
RESPIRATORY NEGATIVE: 1
CHEST TIGHTNESS: 0
TROUBLE SWALLOWING: 0
BLOOD IN STOOL: 0
ABDOMINAL DISTENTION: 0
SINUS PAIN: 0
FACIAL SWELLING: 0
COUGH: 0
SORE THROAT: 0
EYES NEGATIVE: 1
ALLERGIC/IMMUNOLOGIC NEGATIVE: 1
APNEA: 0
SINUS PRESSURE: 0

## 2023-02-01 ENCOUNTER — TELEMEDICINE (OUTPATIENT)
Dept: FAMILY MEDICINE CLINIC | Facility: CLINIC | Age: 38
End: 2023-02-01
Payer: COMMERCIAL

## 2023-02-01 DIAGNOSIS — F41.1 GENERALIZED ANXIETY DISORDER: Primary | ICD-10-CM

## 2023-02-01 DIAGNOSIS — F32.A DEPRESSION, UNSPECIFIED DEPRESSION TYPE: ICD-10-CM

## 2023-02-01 PROCEDURE — 99214 OFFICE O/P EST MOD 30 MIN: CPT | Performed by: NURSE PRACTITIONER

## 2023-02-01 RX ORDER — PROPRANOLOL HYDROCHLORIDE 10 MG/1
10 TABLET ORAL 3 TIMES DAILY PRN
Qty: 30 TABLET | Refills: 0 | Status: SHIPPED | OUTPATIENT
Start: 2023-02-01

## 2023-02-01 SDOH — ECONOMIC STABILITY: FOOD INSECURITY: WITHIN THE PAST 12 MONTHS, YOU WORRIED THAT YOUR FOOD WOULD RUN OUT BEFORE YOU GOT MONEY TO BUY MORE.: SOMETIMES TRUE

## 2023-02-01 SDOH — ECONOMIC STABILITY: TRANSPORTATION INSECURITY
IN THE PAST 12 MONTHS, HAS LACK OF TRANSPORTATION KEPT YOU FROM MEETINGS, WORK, OR FROM GETTING THINGS NEEDED FOR DAILY LIVING?: NO

## 2023-02-01 SDOH — ECONOMIC STABILITY: FOOD INSECURITY: WITHIN THE PAST 12 MONTHS, THE FOOD YOU BOUGHT JUST DIDN'T LAST AND YOU DIDN'T HAVE MONEY TO GET MORE.: NEVER TRUE

## 2023-02-01 SDOH — ECONOMIC STABILITY: HOUSING INSECURITY
IN THE LAST 12 MONTHS, WAS THERE A TIME WHEN YOU DID NOT HAVE A STEADY PLACE TO SLEEP OR SLEPT IN A SHELTER (INCLUDING NOW)?: NO

## 2023-02-01 SDOH — ECONOMIC STABILITY: INCOME INSECURITY: HOW HARD IS IT FOR YOU TO PAY FOR THE VERY BASICS LIKE FOOD, HOUSING, MEDICAL CARE, AND HEATING?: NOT VERY HARD

## 2023-02-01 ASSESSMENT — ENCOUNTER SYMPTOMS
SHORTNESS OF BREATH: 0
PHOTOPHOBIA: 0
RHINORRHEA: 0
EYE ITCHING: 0
TROUBLE SWALLOWING: 0
ABDOMINAL PAIN: 0
EYE REDNESS: 0
VOMITING: 0
CONSTIPATION: 0
EYES NEGATIVE: 1
BLOOD IN STOOL: 0
EYE PAIN: 0
FACIAL SWELLING: 0
WHEEZING: 0
RESPIRATORY NEGATIVE: 1
NAUSEA: 0
DIARRHEA: 0
APNEA: 0
ABDOMINAL DISTENTION: 0
ALLERGIC/IMMUNOLOGIC NEGATIVE: 1
EYE DISCHARGE: 0
BACK PAIN: 0
COLOR CHANGE: 0
ANAL BLEEDING: 0
SINUS PAIN: 0
STRIDOR: 0
SINUS PRESSURE: 0
CHEST TIGHTNESS: 0
VOICE CHANGE: 0
COUGH: 0
GASTROINTESTINAL NEGATIVE: 1
CHOKING: 0
SORE THROAT: 0
RECTAL PAIN: 0

## 2023-02-01 NOTE — PROGRESS NOTES
PROGRESS NOTE        Consent: This patient and/or their healthcare decision maker is aware that this patient-initiated Telehealth encounter is a billable service, with coverage as determined by their insurance carrier. Patient is aware that they may receive a bill and has provided verbal consent to proceed: Yes    I was in the officewhile conducting this encounter. Patient was at home. This virtual visit was conducted via [facility]. Pursuant to the emergency declaration under the [facility], [facility] waiver authority and the [employer] Act, this Virtual  Visit was conducted to reduce the patient's risk of exposure to COVID-19 and provide continuity of care for an established patient. Services were provided through a video synchronous discussion virtually to substitute for in-person clinic visit. Due to this being a TeleHealth evaluation, many elements of the physical examination are unable to be assessed. On this date 2/1/2023 I have spent 30 minutes reviewing previous notes, test results and face to face (virtual) with the patient discussing the diagnosis and importance of compliance with the treatment plan as well as documenting on the day of the visit. . Greater than 50% of this visit was spent counseling the patient about test results, prognosis, importance of compliance, education about disease process, benefits of medications, instructions for management of acute symptoms, and follow up plans.         Chief Complaint   Patient presents with    Follow-up     States that she feels like she may need to increase the dosage because it does not seem to be working as well as it used to be       SUBJECTIVE:     Gurmeet Givens is a very pleasant 30 y.o. female , with past medical history significant for anxiety, migraine headache, seasonal allergies, recurrent strep throat, tonsillitis and sinusitis - currently following ENT, vitamin D deficiency, GERD, back pain, and arthritis, seen virtually regarding anxiety management. Pt was switched from lexapro to zoloft and she continues to remain on wellbutrin to assist with side effects of jaw clenching when she tried zoloft in the past. Today, pt reports no significant improvement in her anxiety. Patient reports no significant changes in her stressors. She did have 1 panic attack recently. Pt reports no chest pain, no shortness of breath, no palpitation (unless she has a panic attack), no orthopnea, no PND, no thoughts or plans of suicide, self harm or homicide. Past Medical History, Past Surgical History, Family history, Social History, and Medications were all reviewed with the patient today and updated as necessary. Current Outpatient Medications   Medication Sig Dispense Refill    propranolol (INDERAL) 10 MG tablet Take 1 tablet by mouth 3 times daily as needed (anxiety/panic attack) 30 tablet 0    sertraline (ZOLOFT) 50 MG tablet Take 1/2 tablet with food orally once daily for 1 week then increase to 1 tablet with food orally daily afterwards 30 tablet 1    buPROPion (WELLBUTRIN XL) 300 MG extended release tablet Take 1 tablet by mouth every morning With food 90 tablet 3    tiZANidine (ZANAFLEX) 4 MG tablet Take 1 tablet by mouth nightly as needed (Muscle Spasm) May cause drowsiness 30 tablet 5    traZODone (DESYREL) 50 MG tablet Take 1/2 to 1 tablet orally at bedtime as needed for sleep 90 tablet 3    Rimegepant Sulfate (NURTEC) 75 MG TBDP Take 75 mg by mouth daily as needed (migraine) 30 tablet 11    vitamin D (ERGOCALCIFEROL) 1.25 MG (24277 UT) CAPS capsule TAKE 1 CAPSULE BY MOUTH EVERY SEVEN (7) DAYS. WITH FOOD 5 capsule 2    SLYND 4 MG TABS Take 1 tablet by mouth every morning       No current facility-administered medications for this visit.      Allergies   Allergen Reactions    Sertraline Anxiety     Worsening symptoms with jaw clenching     Patient Active Problem List   Diagnosis    Back pain    Throat pain    Throat infection    Migraine with aura     History reviewed. No pertinent past medical history. Past Surgical History:   Procedure Laterality Date    CHOLECYSTECTOMY      GYN      NOSE SURGERY Bilateral 10/11/2022    INFERIOR TURBINATE REDUCTION performed by Kendra Foley DO at [facility]    SEPTOPLASTY N/A 10/11/2022    SEPTOPLASTY GRAFT performed by Kendra Foley DO at [address] [place] [address] Street Bilateral 10/11/2022    SINUS ENDOSCOPY LEFT MAXILARY TISSUE REMOVAL USING BALLOONS/ LEFT ROSS BULLOSA EXCISION performed by Kendra Foley DO at [address] [place] [address] N/A 10/11/2022    TONSILLECTOMY performed by Kendra Foley DO at [facility] MAIN OR     Family History   Problem Relation Age of Onset    No Known Problems Mother     No Known Problems Father      Social History     Tobacco Use    Smoking status: Former    Smokeless tobacco: Never   Substance Use Topics    Alcohol use: Yes     Comment: occasionally         Review of Systems   Constitutional: Negative. Negative for activity change, appetite change, chills, diaphoresis, fatigue, fever and unexpected weight change. HENT: Negative. Negative for congestion, dental problem, drooling, ear discharge, ear pain, facial swelling, hearing loss, mouth sores, nosebleeds, postnasal drip, rhinorrhea, sinus pressure, sinus pain, sneezing, sore throat, tinnitus, trouble swallowing and voice change. Eyes: Negative. Negative for photophobia, pain, discharge, redness, itching and visual disturbance. Respiratory: Negative. Negative for apnea, cough, choking, chest tightness, shortness of breath, wheezing and stridor. Cardiovascular: Negative. Negative for chest pain, palpitations and leg swelling. Gastrointestinal: Negative. Negative for abdominal distention, abdominal pain, anal bleeding, blood in stool, constipation, diarrhea, nausea, rectal pain and vomiting.    Endocrine: Negative. Negative for cold intolerance, heat intolerance, polydipsia, polyphagia and polyuria. Genitourinary: Negative. Negative for decreased urine volume, difficulty urinating, dysuria, enuresis, flank pain, frequency, genital sores, hematuria and urgency. Musculoskeletal: Negative. Negative for arthralgias, back pain, gait problem, joint swelling, myalgias, neck pain and neck stiffness. Skin: Negative. Negative for color change, pallor, rash and wound. Allergic/Immunologic: Negative. Negative for environmental allergies, food allergies and immunocompromised state. Neurological: Negative. Negative for dizziness, tremors, seizures, syncope, facial asymmetry, speech difficulty, weakness, light-headedness, numbness and headaches. Hematological: Negative. Negative for adenopathy. Does not bruise/bleed easily. Psychiatric/Behavioral:  Positive for dysphoric mood. Negative for agitation, behavioral problems, confusion, decreased concentration, hallucinations, self-injury, sleep disturbance and suicidal ideas. The patient is nervous/anxious. The patient is not hyperactive. OBJECTIVE:    Patient's vital signs were not performed as encounter was performed virtually. Location of virtual visit was patient's home. Physical Exam  Constitutional:       General: She is not in acute distress. Appearance: Normal appearance. She is not toxic-appearing or diaphoretic. HENT:      Head: Normocephalic and atraumatic. Neurological:      General: No focal deficit present. Mental Status: She is alert and oriented to person, place, and time. Psychiatric:         Mood and Affect: Mood normal.         Behavior: Behavior normal.         Thought Content: Thought content normal.         Judgment: Judgment normal.        Medical problems and test results were reviewed with the patient today. Lab Results   Component Value Date    TSH 2.160 05/19/2022       ASSESSMENT and PLAN    1.  Generalized anxiety disorder  -     propranolol (INDERAL) 10 MG tablet; Take 1 tablet by mouth 3 times daily as needed (anxiety/panic attack), Disp-30 tablet, R-0Normal  -     External Referral to Psychiatry    Patient is currently on Zoloft 50 mg and Wellbutrin 300 mg daily. She reports no significant improvement but she is only currently on the Zoloft 50 mg for about 1 week. She reports having 1 panic attack. We will give a prescription for propanolol for as needed use. She reports no significant change in stressors and is unsure why she is having more symptoms that are not controlled with current regimen. Referral was placed to psychiatry for consideration. Patient was noted to have a normal thyroid level along with CMP and CBC with last lab draw. She was also seen at the ED in September 2022 for complaints of dizziness. At that time cardiac work-up was negative with negative troponin and negative EKG. Will have patient come into the office in 2 days for follow-up and physical examination along with labs to rule out other underlying causes. Advised pt to go immediately to the ED with any loss or changes in fields of vision, chest pain or SOB, unilateral weakness, loss of speech, changes in speech, confusion, facial drooping, syncope, seizure or incontinence of bladder or bowel functions. 2. Depression, unspecified depression type  -     External Referral to Psychiatry    As above. Orders Placed This Encounter   Procedures    External Referral to Psychiatry     Referral Priority:   Routine     Referral Type:   Eval and Treat     Referral Reason:   Specialty Services Required     Requested Specialty:   Psychiatry     Number of Visits Requested:   1         Elements of this note have been dictated using speech recognition software. As a result, errors of speech recognition may have occurred. Return in about 2 days (around 2/3/2023) for in office for evaluation.      Ankita Loo, APRN - CNP

## 2023-02-03 ENCOUNTER — OFFICE VISIT (OUTPATIENT)
Dept: FAMILY MEDICINE CLINIC | Facility: CLINIC | Age: 38
End: 2023-02-03
Payer: COMMERCIAL

## 2023-02-03 VITALS
HEART RATE: 93 BPM | OXYGEN SATURATION: 97 % | DIASTOLIC BLOOD PRESSURE: 66 MMHG | SYSTOLIC BLOOD PRESSURE: 128 MMHG | RESPIRATION RATE: 18 BRPM | HEIGHT: 69 IN | BODY MASS INDEX: 32.19 KG/M2 | WEIGHT: 217.3 LBS

## 2023-02-03 DIAGNOSIS — F41.9 ANXIETY: ICD-10-CM

## 2023-02-03 DIAGNOSIS — R53.83 OTHER FATIGUE: ICD-10-CM

## 2023-02-03 DIAGNOSIS — R40.0 DAYTIME SOMNOLENCE: ICD-10-CM

## 2023-02-03 DIAGNOSIS — R07.89 CHEST PRESSURE: Primary | ICD-10-CM

## 2023-02-03 DIAGNOSIS — R29.818 SUSPECTED SLEEP APNEA: ICD-10-CM

## 2023-02-03 LAB
BILIRUBIN, URINE, POC: NEGATIVE
BLOOD URINE, POC: NEGATIVE
GLUCOSE URINE, POC: NEGATIVE
GRANS ABSOLUTE, POC: 5 K/UL
GRANULOCYTES %, POC: 61.5 %
HCG, PREGNANCY, URINE, POC: NEGATIVE
HEMATOCRIT, POC: 40.9 %
HEMOGLOBIN, POC: 13 G/DL
KETONES, URINE, POC: NEGATIVE
LEUKOCYTE ESTERASE, URINE, POC: NEGATIVE
LYMPHOCYTE %, POC: 28.6 %
LYMPHS ABSOLUTE, POC: 2.3 K/UL
MCH, POC: 29.1 PG (ref 40–?)
MCHC, POC: 31.8
MCV, POC: 91.6
MONOCYTE %, POC: 9.9 %
MONOCYTE, ABSOLUTE POC: 0.8 K/UL
MPV, POC: 6.5 FL
NITRITE, URINE, POC: NEGATIVE
PH, URINE, POC: 5.5 (ref 4.6–8)
PLATELET COUNT, POC: ABNORMAL K/UL
PROTEIN,URINE, POC: NEGATIVE
RBC, POC: 4.46 M/UL
RDW, POC: 13 %
SPECIFIC GRAVITY, URINE, POC: 1.01 (ref 1–1.03)
URINALYSIS CLARITY, POC: CLEAR
URINALYSIS COLOR, POC: YELLOW
UROBILINOGEN, POC: NORMAL
VALID INTERNAL CONTROL, POC: YES
WBC, POC: 8.2 K/UL

## 2023-02-03 PROCEDURE — 93000 ELECTROCARDIOGRAM COMPLETE: CPT | Performed by: NURSE PRACTITIONER

## 2023-02-03 PROCEDURE — 36415 COLL VENOUS BLD VENIPUNCTURE: CPT | Performed by: NURSE PRACTITIONER

## 2023-02-03 PROCEDURE — 99215 OFFICE O/P EST HI 40 MIN: CPT | Performed by: NURSE PRACTITIONER

## 2023-02-03 PROCEDURE — 85025 COMPLETE CBC W/AUTO DIFF WBC: CPT | Performed by: NURSE PRACTITIONER

## 2023-02-03 PROCEDURE — 81003 URINALYSIS AUTO W/O SCOPE: CPT | Performed by: NURSE PRACTITIONER

## 2023-02-03 PROCEDURE — 81025 URINE PREGNANCY TEST: CPT | Performed by: NURSE PRACTITIONER

## 2023-02-03 RX ORDER — BUPROPION HYDROCHLORIDE 150 MG/1
150 TABLET ORAL EVERY MORNING
Qty: 30 TABLET | Refills: 3 | Status: SHIPPED | OUTPATIENT
Start: 2023-02-03

## 2023-02-03 ASSESSMENT — ENCOUNTER SYMPTOMS
ABDOMINAL PAIN: 0
SINUS PRESSURE: 0
APNEA: 0
DIARRHEA: 0
FACIAL SWELLING: 0
EYE DISCHARGE: 0
GASTROINTESTINAL NEGATIVE: 1
CHEST TIGHTNESS: 0
ANAL BLEEDING: 0
SORE THROAT: 0
EYES NEGATIVE: 1
ALLERGIC/IMMUNOLOGIC NEGATIVE: 1
EYE PAIN: 0
CHOKING: 0
SINUS PAIN: 0
CONSTIPATION: 0
WHEEZING: 0
EYE REDNESS: 0
BACK PAIN: 0
SHORTNESS OF BREATH: 0
COLOR CHANGE: 0
NAUSEA: 0
VOICE CHANGE: 0
STRIDOR: 0
COUGH: 0
ABDOMINAL DISTENTION: 0
VOMITING: 0
RESPIRATORY NEGATIVE: 1
BLOOD IN STOOL: 0
EYE ITCHING: 0
RECTAL PAIN: 0
PHOTOPHOBIA: 0
RHINORRHEA: 0
TROUBLE SWALLOWING: 0

## 2023-02-03 ASSESSMENT — SLEEP AND FATIGUE QUESTIONNAIRES
HOW LIKELY ARE YOU TO NOD OFF OR FALL ASLEEP WHILE SITTING AND READING: 3
HOW LIKELY ARE YOU TO NOD OFF OR FALL ASLEEP WHILE LYING DOWN TO REST IN THE AFTERNOON WHEN CIRCUMSTANCES PERMIT: 3
HOW LIKELY ARE YOU TO NOD OFF OR FALL ASLEEP WHILE SITTING QUIETLY AFTER LUNCH WITHOUT ALCOHOL: 1
HOW LIKELY ARE YOU TO NOD OFF OR FALL ASLEEP IN A CAR, WHILE STOPPED FOR A FEW MINUTES IN TRAFFIC: 0
ESS TOTAL SCORE: 17
HOW LIKELY ARE YOU TO NOD OFF OR FALL ASLEEP WHILE WATCHING TV: 3
NECK CIRCUMFERENCE (INCHES): 14.5
HOW LIKELY ARE YOU TO NOD OFF OR FALL ASLEEP WHILE SITTING AND TALKING TO SOMEONE: 1
HOW LIKELY ARE YOU TO NOD OFF OR FALL ASLEEP WHEN YOU ARE A PASSENGER IN A CAR FOR AN HOUR WITHOUT A BREAK: 3
HOW LIKELY ARE YOU TO NOD OFF OR FALL ASLEEP WHILE SITTING INACTIVE IN A PUBLIC PLACE: 3

## 2023-02-03 NOTE — PROGRESS NOTES
PROGRESS NOTE    Chief Complaint   Patient presents with    Follow-up     Presenting for follow up to discuss YURIDIA, Depression and med follow up after starting Propranolol. States that she has not seen a difference. SUBJECTIVE:     Tomi Mrach is a very pleasant 41 y.o. female with hx of anxiety, migraine headache, seasonal allergies, recurrent strep throat, tonsillitis and sinusitis - currently following ENT, vitamin D deficiency, GERD, back pain, and arthritis , seen today in office for follow-up. Patient with complaints of uncontrolled anxiety feeling and sensation. Worsened in the last couple weeks. She is currently taking Zoloft 50 mg and Wellbutrin 300 mg.  Zoloft 50 mg has only been 1 week. Patient has been referred to psychiatry and she reports that she has an appointment next Wednesday 2/8 to establish care. She reports sensation as more of an overwhelming feeling and she feels like \" I want to cry\" frequently. She reports no chest pain but has had more of a chest tightness and \"change in breathing\". She reports no shortness of breath particularly had no dyspnea with exertion. She describes sensation as more of having to occasionally taking a deep breath. She reports no active chest pain, no palpitation, no unilateral or focal weakness, no left jaw pain, no diaphoresis, no GERD symptoms, no abdominal pain, no melena, no hematochezia, no hematemesis, no hemoptysis, no hematuria, no changes in no incontinence of bladder or bowel function. Past Medical History, Past Surgical History, Family history, Social History, and Medications were all reviewed with the patient today and updated as necessary.        Current Outpatient Medications   Medication Sig Dispense Refill    buPROPion (WELLBUTRIN XL) 150 MG extended release tablet Take 1 tablet by mouth every morning 30 tablet 3    propranolol (INDERAL) 10 MG tablet Take 1 tablet by mouth 3 times daily as needed (anxiety/panic attack) 30 tablet 0    sertraline (ZOLOFT) 50 MG tablet Take 1/2 tablet with food orally once daily for 1 week then increase to 1 tablet with food orally daily afterwards 30 tablet 1    tiZANidine (ZANAFLEX) 4 MG tablet Take 1 tablet by mouth nightly as needed (Muscle Spasm) May cause drowsiness 30 tablet 5    traZODone (DESYREL) 50 MG tablet Take 1/2 to 1 tablet orally at bedtime as needed for sleep 90 tablet 3    Rimegepant Sulfate (NURTEC) 75 MG TBDP Take 75 mg by mouth daily as needed (migraine) 30 tablet 11    vitamin D (ERGOCALCIFEROL) 1.25 MG (81171 UT) CAPS capsule TAKE 1 CAPSULE BY MOUTH EVERY SEVEN (7) DAYS. WITH FOOD 5 capsule 2    SLYND 4 MG TABS Take 1 tablet by mouth every morning       No current facility-administered medications for this visit. Allergies   Allergen Reactions    Sertraline Anxiety     Worsening symptoms with jaw clenching     Patient Active Problem List   Diagnosis    Back pain    Throat pain    Throat infection    Migraine with aura     History reviewed. No pertinent past medical history.   Past Surgical History:   Procedure Laterality Date    CHOLECYSTECTOMY      GYN      NOSE SURGERY Bilateral 10/11/2022    INFERIOR TURBINATE REDUCTION performed by Brenna Wesley DO at [facility]    SEPTOPLASTY N/A 10/11/2022    SEPTOPLASTY GRAFT performed by Brenna Wesley DO at [address] [place] [address] Bilateral 10/11/2022    SINUS ENDOSCOPY LEFT MAXILARY TISSUE REMOVAL USING BALLOONS/ LEFT ROSS BULLOSA EXCISION performed by Brenna Wesley DO at [address] [place] [address] N/A 10/11/2022    TONSILLECTOMY performed by Brenna Wesley DO at [facility] MAIN OR     Family History   Problem Relation Age of Onset    No Known Problems Mother     No Known Problems Father      Social History     Tobacco Use    Smoking status: Former    Smokeless tobacco: Never   Substance Use Topics    Alcohol use: Yes     Comment: occasionally         REVIEW OF SYSTEM    Review of Systems   Constitutional: Negative. Negative for activity change, appetite change, chills, diaphoresis, fatigue, fever and unexpected weight change. HENT: Negative. Negative for congestion, dental problem, drooling, ear discharge, ear pain, facial swelling, hearing loss, mouth sores, nosebleeds, postnasal drip, rhinorrhea, sinus pressure, sinus pain, sneezing, sore throat, tinnitus, trouble swallowing and voice change. Eyes: Negative. Negative for photophobia, pain, discharge, redness, itching and visual disturbance. Respiratory: Negative. Negative for apnea, cough, choking, chest tightness, shortness of breath, wheezing and stridor. Cardiovascular: Negative. Negative for chest pain, palpitations and leg swelling. Chest tightness and sensation of having to take a deep breath occasionally   Gastrointestinal: Negative. Negative for abdominal distention, abdominal pain, anal bleeding, blood in stool, constipation, diarrhea, nausea, rectal pain and vomiting. Endocrine: Negative. Negative for cold intolerance, heat intolerance, polydipsia, polyphagia and polyuria. Genitourinary: Negative. Negative for decreased urine volume, difficulty urinating, dysuria, enuresis, flank pain, frequency, genital sores, hematuria and urgency. Musculoskeletal: Negative. Negative for arthralgias, back pain, gait problem, joint swelling, myalgias, neck pain and neck stiffness. Skin: Negative. Negative for color change, pallor, rash and wound. Allergic/Immunologic: Negative. Negative for environmental allergies, food allergies and immunocompromised state. Neurological: Negative. Negative for dizziness, tremors, seizures, syncope, facial asymmetry, speech difficulty, weakness, light-headedness, numbness and headaches. Hematological: Negative. Negative for adenopathy. Does not bruise/bleed easily. Psychiatric/Behavioral:  Positive for dysphoric mood.  Negative for agitation, behavioral problems, confusion, decreased concentration, hallucinations, self-injury, sleep disturbance and suicidal ideas. The patient is nervous/anxious. The patient is not hyperactive. OBJECTIVE:  /66 (Site: Left Upper Arm, Position: Sitting, Cuff Size: Medium Adult)   Pulse 93   Resp 18   Ht 5' 9\" (1.753 m)   Wt 217 lb 4.8 oz (98.6 kg)   SpO2 97%   BMI 32.09 kg/m²      Physical Exam  Constitutional:       General: She is not in acute distress. Appearance: Normal appearance. She is not ill-appearing, toxic-appearing or diaphoretic. HENT:      Head: Normocephalic and atraumatic. Right Ear: Ear canal and external ear normal. There is no impacted cerumen. Left Ear: Ear canal and external ear normal. There is no impacted cerumen. Ears:      Comments: Bilateral TM dull with effusion. No erythema, no rupture, no active drainage or discharge     Nose: Nose normal. No congestion or rhinorrhea. Mouth/Throat:      Mouth: Mucous membranes are moist.      Pharynx: Oropharynx is clear. Eyes:      Extraocular Movements: Extraocular movements intact. Conjunctiva/sclera: Conjunctivae normal.      Pupils: Pupils are equal, round, and reactive to light. Neck:      Vascular: No carotid bruit. Comments: No goiter  Cardiovascular:      Rate and Rhythm: Normal rate and regular rhythm. Pulses: Normal pulses. Heart sounds: Normal heart sounds. No murmur heard. No friction rub. No gallop. Pulmonary:      Effort: Pulmonary effort is normal. No respiratory distress. Breath sounds: Normal breath sounds. No stridor. No wheezing, rhonchi or rales. Chest:      Chest wall: No tenderness. Abdominal:      General: Abdomen is flat. Bowel sounds are normal. There is no distension. Palpations: Abdomen is soft. There is no shifting dullness, fluid wave, hepatomegaly, splenomegaly, mass or pulsatile mass. Tenderness: There is no abdominal tenderness.  There is no right CVA tenderness, left CVA tenderness, guarding or rebound. Negative signs include Silva's sign, Rovsing's sign, McBurney's sign, psoas sign and obturator sign. Hernia: No hernia is present. Musculoskeletal:         General: Normal range of motion. Cervical back: Normal range of motion and neck supple. No rigidity or tenderness. Right lower leg: No edema. Left lower leg: No edema. Lymphadenopathy:      Cervical: Cervical adenopathy (Mild tonsillar lymph node enlargement. No tenderness with palpation) present. Skin:     General: Skin is warm and dry. Capillary Refill: Capillary refill takes less than 2 seconds. Neurological:      General: No focal deficit present. Mental Status: She is alert and oriented to person, place, and time. Psychiatric:         Mood and Affect: Mood normal.         Behavior: Behavior normal.         Thought Content: Thought content normal.         Judgment: Judgment normal.         Medical problems and test results were reviewed with the patient today. 12 Lead ECG    EKG: normal EKG, normal sinus rhythm, normal axis, normal QT, there are no previous tracings available for comparison. No acute ischemic changes noted. TIARA Ryan - CNP    Sleep Medicine 2/3/2023   Sitting and reading 3   Watching TV 3   Sitting, inactive in a public place (e.g. a theatre or a meeting) 3   As a passenger in a car for an hour without a break 3   Lying down to rest in the afternoon when circumstances permit 3   Sitting and talking to someone 1   Sitting quietly after a lunch without alcohol 1   In a car, while stopped for a few minutes in traffic 0   [place] Sleepiness Score 17   Neck circumference (Inches) 14.5         ASSESSMENT and PLAN    1. Chest pressure  -     Magnesium; Future  -     AMB POC KTY COMPLETE CBC; Future  -     Comprehensive Metabolic Panel; Future  -     TSH with Reflex; Future  -     C-Reactive Protein; Future  -     Sedimentation Rate;  Future  -     EKG 12 Lead; Future    Etiology unclear. CBC show hemoglobin stable at 13.0 and hematocrit of 40.9. Patient is noted with a slightly elevated platelet count of 685. Other labs ordered for evaluation include TSH, sed rate, CRP, mag. EKG showed no acute ischemic changes with normal sinus rhythm. Upon further questioning, patient reports disruptive sleep and persistent daytime tiredness and fatigue. She is currently taking trazodone as needed for sleep at bedtime. She does report able to sleep throughout the night with use of medication. Otherwise, she finds herself waking up frequently. She has never been evaluated for sleep apnea. See below. 2. Anxiety  -     Magnesium; Future    Patient is currently taking Zoloft 50 mg in addition to Wellbutrin 300 mg daily. She has been referred to psychiatry for further evaluation and consultation. She has an appointment on Wednesday 2/8. Patient to keep follow-up appointment currently scheduled for consultation. Possibility of increasing in symptom due to higher dose of Wellbutrin. We will have patient continue with Zoloft 50 mg daily but will have patient decrease Wellbutrin to 150 mg daily at this time until she is seen and evaluated by psychiatry. Patient to go to the ED with any chest pain, palpitation, or strokelike symptoms. 3.  Daytime somnolence  4. Suspected sleep apnea  5. Other fatigue   Patient reports persistent daytime tiredness and fatigue in addition to disruptive sleep cycle especially without the use of trazodone as needed. She has never been evaluated for sleep apnea. [place] sleep score is noted to be 17 and neck circumference measurement at 14.5 inches. She is noted to have a crowded narrow airway. Referral was placed to sleep medicine for consideration.     Orders Placed This Encounter   Procedures    Magnesium     Standing Status:   Future     Standing Expiration Date:   2/3/2024    Comprehensive Metabolic Panel     Standing Status: Future     Standing Expiration Date:   2/3/2024    TSH with Reflex     Standing Status:   Future     Standing Expiration Date:   2/3/2024    C-Reactive Protein     Standing Status:   Future     Standing Expiration Date:   2/3/2024    Sedimentation Rate     Standing Status:   Future     Standing Expiration Date:   2/3/2024    [address]     Referral Priority:   Routine     Referral Type:   Eval and Treat     Referral Reason:   Specialty Services Required     Number of Visits Requested:   1    [id number] - Venipuncture    AMB POC KTY COMPLETE CBC     Standing Status:   Future     Number of Occurrences:   1     Standing Expiration Date:   2/3/2024    AMB POC URINALYSIS DIP STICK AUTO W/O MICRO    AMB POC URINE PREGNANCY TEST, VISUAL COLOR COMPARISON    EKG 12 Lead     Standing Status:   Future     Number of Occurrences:   1     Standing Expiration Date:   2/3/2024     Order Specific Question:   Reason for Exam?     Answer:   Chest pain     Order Specific Question:   Reason for Exam?     Answer: Anxiety         Elements of this note have been dictated using speech recognition software. As a result, errors of speech recognition may have occurred. On this date 2/3/2023 I have spent 45 minutes reviewing previous notes, test results and face to face with the patient discussing the diagnosis and importance of compliance with the treatment plan as well as documenting on the day of the visit. Greater than 50% of this visit was spent counseling the patient about test results, prognosis, importance of compliance, education about disease process, benefits of medications, instructions for management of acute symptoms, and follow up plans. Return if symptoms worsen or fail to improve.      Nino Call, TIARA - CNP

## 2023-02-18 DIAGNOSIS — F32.A DEPRESSION, UNSPECIFIED DEPRESSION TYPE: ICD-10-CM

## 2023-02-18 DIAGNOSIS — F41.1 GENERALIZED ANXIETY DISORDER: ICD-10-CM

## 2023-02-21 RX ORDER — ESCITALOPRAM OXALATE 10 MG/1
TABLET ORAL
Qty: 30 TABLET | Refills: 1 | OUTPATIENT
Start: 2023-02-21

## 2023-02-23 ENCOUNTER — TELEMEDICINE (OUTPATIENT)
Dept: FAMILY MEDICINE CLINIC | Facility: CLINIC | Age: 38
End: 2023-02-23
Payer: COMMERCIAL

## 2023-02-23 DIAGNOSIS — F41.1 GENERALIZED ANXIETY DISORDER: Primary | ICD-10-CM

## 2023-02-23 DIAGNOSIS — R53.83 OTHER FATIGUE: ICD-10-CM

## 2023-02-23 PROCEDURE — 99213 OFFICE O/P EST LOW 20 MIN: CPT | Performed by: NURSE PRACTITIONER

## 2023-02-23 RX ORDER — LORAZEPAM 0.5 MG/1
TABLET ORAL
Qty: 15 TABLET | Refills: 0 | Status: SHIPPED | OUTPATIENT
Start: 2023-02-23 | End: 2023-03-25

## 2023-02-23 ASSESSMENT — ENCOUNTER SYMPTOMS
CONSTIPATION: 0
RECTAL PAIN: 0
SHORTNESS OF BREATH: 0
SINUS PAIN: 0
GASTROINTESTINAL NEGATIVE: 1
DIARRHEA: 0
BLOOD IN STOOL: 0
APNEA: 0
PHOTOPHOBIA: 0
EYES NEGATIVE: 1
RHINORRHEA: 0
RESPIRATORY NEGATIVE: 1
TROUBLE SWALLOWING: 0
ANAL BLEEDING: 0
STRIDOR: 0
VOMITING: 0
FACIAL SWELLING: 0
CHEST TIGHTNESS: 0
ALLERGIC/IMMUNOLOGIC NEGATIVE: 1
BACK PAIN: 0
COLOR CHANGE: 0
SINUS PRESSURE: 0
COUGH: 0
WHEEZING: 0
ABDOMINAL PAIN: 0
SORE THROAT: 0
EYE REDNESS: 0
NAUSEA: 0
EYE PAIN: 0
VOICE CHANGE: 0
EYE ITCHING: 0
ABDOMINAL DISTENTION: 0
CHOKING: 0
EYE DISCHARGE: 0

## 2023-02-23 NOTE — PROGRESS NOTES
PROGRESS NOTE        Consent:    Dayna Narayanan, was evaluated through a synchronous (real-time) audio-video encounter. The patient (or guardian if applicable) is aware that this is a billable service, which includes applicable co-pays. This Virtual Visit was conducted with patient's (and/or legal guardian's) consent. The visit was conducted pursuant to the emergency declaration under the [facility], [facility] and the Felix Swoop and AMS Vari[employer] General Act. Patient identification was verified, and a caregiver was present when appropriate. The patient was located at Home: [address]  Provider was [facility] [address] [place] [address] Dr Melchor Galvan [address] [place] [id number]         On this date 2/23/2023 I have spent 20 minutes reviewing previous notes, test results and face to face (virtual) with the patient discussing the diagnosis and importance of compliance with the treatment plan as well as documenting on the day of the visit. . Greater than 50% of this visit was spent counseling the patient about test results, prognosis, importance of compliance, education about disease process, benefits of medications, instructions for management of acute symptoms, and follow up plans. Chief Complaint   Patient presents with    Anxiety     States that she has been having lots of panic attacks. Scheduled to see psych on Monday. SUBJECTIVE:     Dayna Narayanan is a very pleasant 41 y.o. female , with past medical history significant for  anxiety, migraine headache, seasonal allergies, recurrent strep throat, tonsillitis and sinusitis - currently following ENT, vitamin D deficiency, GERD, back pain, and arthritis , seen virtually regarding having increased anxiety and panic attacks. Patient is established with psychiatry and has already seen provider once.   However, follow-up sessions were having to be rescheduled due to provider not being available. She reports she has an appointment on Monday to establish care with a new provider. She is having a very difficult time with \"crying spells\" and feeling emotional.  She was seen in office on 2/3 for symptoms. EKG at that time was negative. CBC was also stable. Urine pregnancy and UA were negative. We also had CT reactive protein, TSH, CMP, mag and sed rate ordered for evaluation. Unfortunately due to processing logistics, lab results were not available. Discussed with patient today and she agrees to come back in tomorrow to have labs redrawn after she is off of work. She reports currently working nights for the next 12 weeks at [employer]. She reports no thoughts or plans of self-harm or suicide or homicide. Past Medical History, Past Surgical History, Family history, Social History, and Medications were all reviewed with the patient today and updated as necessary. Current Outpatient Medications   Medication Sig Dispense Refill    LORazepam (ATIVAN) 0.5 MG tablet Take 1/2 to 1 tablet orally up to 2 times daily as needed for anxiety/panic attacks. 15 tablet 0    propranolol (INDERAL) 10 MG tablet Take 1 tablet by mouth 3 times daily as needed (anxiety/panic attack) 30 tablet 0    sertraline (ZOLOFT) 50 MG tablet Take 1/2 tablet with food orally once daily for 1 week then increase to 1 tablet with food orally daily afterwards 30 tablet 1    tiZANidine (ZANAFLEX) 4 MG tablet Take 1 tablet by mouth nightly as needed (Muscle Spasm) May cause drowsiness 30 tablet 5    traZODone (DESYREL) 50 MG tablet Take 1/2 to 1 tablet orally at bedtime as needed for sleep 90 tablet 3    Rimegepant Sulfate (NURTEC) 75 MG TBDP Take 75 mg by mouth daily as needed (migraine) 30 tablet 11    vitamin D (ERGOCALCIFEROL) 1.25 MG (18882 UT) CAPS capsule TAKE 1 CAPSULE BY MOUTH EVERY SEVEN (7) DAYS.  WITH FOOD 5 capsule 2    SLYND 4 MG TABS Take 1 tablet by mouth every morning       No current facility-administered medications for this visit. Allergies   Allergen Reactions    Sertraline Anxiety     Worsening symptoms with jaw clenching     Patient Active Problem List   Diagnosis    Back pain    Throat pain    Throat infection    Migraine with aura     History reviewed. No pertinent past medical history. Past Surgical History:   Procedure Laterality Date    CHOLECYSTECTOMY      GYN      NOSE SURGERY Bilateral 10/11/2022    INFERIOR TURBINATE REDUCTION performed by Brian Cordero DO at [facility]    SEPTOPLASTY N/A 10/11/2022    SEPTOPLASTY GRAFT performed by Brian Cordero DO at [address] [place] [address] Bilateral 10/11/2022    SINUS ENDOSCOPY LEFT MAXILARY TISSUE REMOVAL USING BALLOONS/ LEFT ROSS BULLOSA EXCISION performed by Brian Cordero DO at [address] [place] [address] N/A 10/11/2022    TONSILLECTOMY performed by Brian Cordero DO at [facility] MAIN OR     Family History   Problem Relation Age of Onset    No Known Problems Mother     No Known Problems Father      Social History     Tobacco Use    Smoking status: Former    Smokeless tobacco: Never   Substance Use Topics    Alcohol use: Yes     Comment: occasionally         Review of Systems   Constitutional: Negative. Negative for activity change, appetite change, chills, diaphoresis, fatigue, fever and unexpected weight change. HENT: Negative. Negative for congestion, dental problem, drooling, ear discharge, ear pain, facial swelling, hearing loss, mouth sores, nosebleeds, postnasal drip, rhinorrhea, sinus pressure, sinus pain, sneezing, sore throat, tinnitus, trouble swallowing and voice change. Eyes: Negative. Negative for photophobia, pain, discharge, redness, itching and visual disturbance. Respiratory: Negative. Negative for apnea, cough, choking, chest tightness, shortness of breath, wheezing and stridor. Cardiovascular: Negative.   Negative for chest pain, palpitations and leg swelling. Chest tightness and sensation of having to take a deep breath occasionally   Gastrointestinal: Negative. Negative for abdominal distention, abdominal pain, anal bleeding, blood in stool, constipation, diarrhea, nausea, rectal pain and vomiting. Endocrine: Negative. Negative for cold intolerance, heat intolerance, polydipsia, polyphagia and polyuria. Genitourinary: Negative. Negative for decreased urine volume, difficulty urinating, dysuria, enuresis, flank pain, frequency, genital sores, hematuria and urgency. Musculoskeletal: Negative. Negative for arthralgias, back pain, gait problem, joint swelling, myalgias, neck pain and neck stiffness. Skin: Negative. Negative for color change, pallor, rash and wound. Allergic/Immunologic: Negative. Negative for environmental allergies, food allergies and immunocompromised state. Neurological: Negative. Negative for dizziness, tremors, seizures, syncope, facial asymmetry, speech difficulty, weakness, light-headedness, numbness and headaches. Hematological: Negative. Negative for adenopathy. Does not bruise/bleed easily. Psychiatric/Behavioral:  Positive for dysphoric mood. Negative for agitation, behavioral problems, confusion, decreased concentration, hallucinations, self-injury, sleep disturbance and suicidal ideas. The patient is nervous/anxious. The patient is not hyperactive. OBJECTIVE:    Patient's vital signs were not performed as encounter was performed virtually. Location of virtual visit was patient's home. Physical Exam  Constitutional:       General: She is not in acute distress. Appearance: Normal appearance. She is not toxic-appearing or diaphoretic. HENT:      Head: Normocephalic and atraumatic. Neurological:      General: No focal deficit present. Mental Status: She is alert and oriented to person, place, and time.    Psychiatric:         Mood and Affect: Mood normal. Behavior: Behavior normal.         Thought Content: Thought content normal.         Judgment: Judgment normal.        Medical problems and test results were reviewed with the patient today. ASSESSMENT and PLAN    1. Generalized anxiety disorder  -     LORazepam (ATIVAN) 0.5 MG tablet; Take 1/2 to 1 tablet orally up to 2 times daily as needed for anxiety/panic attacks. , Disp-15 tablet, R-0Normal  -     AMB POC URINE PREGNANCY TEST, VISUAL COLOR COMPARISON    The patient is established with psychiatry, patient has only been seen by specialist once. Multiple sessions in postinitial consultation were canceled due to provider not being available. She he reports she has an appointment to establish care with a new provider next week. However, patient is having a very difficult time with crying spells and overwhelming feeling although no significant change in stressors. Unfortunately, lab results for previous lab tests ordered from office visit 2/3 were not resulted/available due to processing logistics. Patient agrees to come back into the office tomorrow morning after she get out of work for labs redrawn. Meanwhile, we will have patient continue with current therapy. We will give patient a few lorazepam as needed for anxiety/panic attacks as the propanolol was not helpful in managing her symptoms. Review risk and side effects of medication including drowsiness, dizziness, increased risk of constipation, increased risk of injury. Patient was advised not to take medication and drive or operate machinery due to increased risk of injury. Patient was also advised to exercise caution when getting up after taking medication to avoid falls. We also discussed in increased risk for dependency and addiction with use of medication and advised patient to use medication as needed and with the least amount as possible to manage her symptoms.       2. Other fatigue  -     AMB POC URINE PREGNANCY TEST, VISUAL COLOR COMPARISON    Orders Placed This Encounter   Procedures    AMB POC URINE PREGNANCY TEST, VISUAL COLOR COMPARISON     Standing Status:   Future     Standing Expiration Date:   2/23/2024         Elements of this note have been dictated using speech recognition software. As a result, errors of speech recognition may have occurred. Return for 1 day for lab appt.      TIARA Owens - CNP

## 2023-02-24 ENCOUNTER — NURSE ONLY (OUTPATIENT)
Dept: FAMILY MEDICINE CLINIC | Facility: CLINIC | Age: 38
End: 2023-02-24
Payer: COMMERCIAL

## 2023-02-24 DIAGNOSIS — F41.1 GENERALIZED ANXIETY DISORDER: ICD-10-CM

## 2023-02-24 DIAGNOSIS — R53.83 OTHER FATIGUE: ICD-10-CM

## 2023-02-24 DIAGNOSIS — F41.9 ANXIETY: ICD-10-CM

## 2023-02-24 DIAGNOSIS — R07.89 CHEST PRESSURE: ICD-10-CM

## 2023-02-24 LAB
ALBUMIN SERPL-MCNC: 4 G/DL (ref 3.5–5)
ALBUMIN/GLOB SERPL: 1.2 (ref 0.4–1.6)
ALP SERPL-CCNC: 99 U/L (ref 50–136)
ALT SERPL-CCNC: 23 U/L (ref 12–65)
ANION GAP SERPL CALC-SCNC: 4 MMOL/L (ref 2–11)
AST SERPL-CCNC: 10 U/L (ref 15–37)
BILIRUB SERPL-MCNC: 0.4 MG/DL (ref 0.2–1.1)
BUN SERPL-MCNC: 10 MG/DL (ref 6–23)
CALCIUM SERPL-MCNC: 9.7 MG/DL (ref 8.3–10.4)
CHLORIDE SERPL-SCNC: 108 MMOL/L (ref 101–110)
CO2 SERPL-SCNC: 26 MMOL/L (ref 21–32)
CREAT SERPL-MCNC: 0.9 MG/DL (ref 0.6–1)
CRP SERPL-MCNC: 0.7 MG/DL (ref 0–0.9)
ERYTHROCYTE [SEDIMENTATION RATE] IN BLOOD: 21 MM/HR (ref 0–20)
GLOBULIN SER CALC-MCNC: 3.4 G/DL (ref 2.8–4.5)
GLUCOSE SERPL-MCNC: 95 MG/DL (ref 65–100)
HCG, PREGNANCY, URINE, POC: NEGATIVE
MAGNESIUM SERPL-MCNC: 2.4 MG/DL (ref 1.8–2.4)
POTASSIUM SERPL-SCNC: 3.8 MMOL/L (ref 3.5–5.1)
PROT SERPL-MCNC: 7.4 G/DL (ref 6.3–8.2)
SODIUM SERPL-SCNC: 138 MMOL/L (ref 133–143)
TSH W FREE THYROID IF ABNORMAL: 2.68 UIU/ML (ref 0.36–3.74)
VALID INTERNAL CONTROL, POC: YES

## 2023-02-24 PROCEDURE — 81025 URINE PREGNANCY TEST: CPT | Performed by: NURSE PRACTITIONER

## 2023-02-24 PROCEDURE — 36415 COLL VENOUS BLD VENIPUNCTURE: CPT | Performed by: NURSE PRACTITIONER

## 2023-03-06 ENCOUNTER — HOSPITAL ENCOUNTER (OUTPATIENT)
Dept: SLEEP MEDICINE | Age: 38
Discharge: HOME | End: 2023-03-09
Payer: COMMERCIAL

## 2023-03-06 PROCEDURE — 95810 POLYSOM 6/> YRS 4/> PARAM: CPT

## 2023-03-11 DIAGNOSIS — F41.1 GENERALIZED ANXIETY DISORDER: ICD-10-CM

## 2023-03-11 DIAGNOSIS — F32.A DEPRESSION, UNSPECIFIED DEPRESSION TYPE: ICD-10-CM

## 2023-03-13 RX ORDER — ESCITALOPRAM OXALATE 10 MG/1
TABLET ORAL
Qty: 30 TABLET | Refills: 1 | OUTPATIENT
Start: 2023-03-13

## 2023-03-13 NOTE — PROGRESS NOTES
[address] [place] [address] Court, [address] [facility]  [phone]    Patient Name:  Nilsa Jones  YOB: 1985      Office Visit 3/14/2023    CHIEF COMPLAINT:    Chief Complaint   Patient presents with    New Patient    Sleep Apnea    Sleep Study    Results         HISTORY OF PRESENT ILLNESS:  Patient is a 42 yo female seen today for new pt evaluation. Pt had a PSG/HST on 3/6/23 with an AHI of 44.9/hr with desaturations to 87%. Pt has a history of bipolar disorder, anxiety, depression, and migraines. Pt reports that she has had issues with poor sleep and frequent nocturnal awakenings as long as she can remember. 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 Her sleep schedule is very fragmented. Pt normally works noon to midnight. She then goes to bed around 2am. She wakes up at 5:15am with her daughter and stays up until her daughter is at school around 7:15am. She then goes back to bed until 10am. She then goes to work. Right now, she is working 7pm-7am. She will go to bed right after dropping her daughter off and then will sleep until about noon or so. She never gets a lot of sleep. She reports that she has headaches all of the time. She does not snore. She is tired all of the time. She does nap frequently when able. We reviewed sleep apnea, risks of untreated sleep apnea, and discussed treatment options. We reviewed her sleep study. She has severe sleep apnea and her sleep apnea is complex with central apneas as well. Pt is agreeable to starting PAP therapy but she is interested in the Insprie.    Sleep Medicine 3/14/2023 2/3/2023   Sitting and reading 2 3   Watching TV 3 3   Sitting, inactive in a public place (e.g. a theatre or a meeting) 2 3   As a passenger in a car for an hour without a break 3 3   Lying down to rest in the afternoon when circumstances permit 3 3   Sitting and talking to someone 1 1 Sitting quietly after a lunch without alcohol 1 1   In a car, while stopped for a few minutes in traffic 1 0   Sprague Sleepiness Score 16 17   Neck circumference (Inches) - 14.5         History reviewed. No pertinent past medical history.       Patient Active Problem List   Diagnosis    Back pain    Throat pain    Throat infection    Migraine with aura          Past Surgical History:   Procedure Laterality Date    CHOLECYSTECTOMY      GYN      NOSE SURGERY Bilateral 10/11/2022    INFERIOR TURBINATE REDUCTION performed by eJssica De Guzman DO at [facility]    SEPTOPLASTY N/A 10/11/2022    SEPTOPLASTY GRAFT performed by Jessica De Guzman DO at [facility] MAIN OR    SINUS ENDOSCOPY Bilateral 10/11/2022    SINUS ENDOSCOPY LEFT MAXILARY TISSUE REMOVAL USING BALLOONS/ LEFT ROSS BULLOSA EXCISION performed by Jessica De Guzman DO at [address] N/A 10/11/2022    TONSILLECTOMY performed by Jessica De Guzman DO at [address] [place] [address] Road History     Socioeconomic History    Marital status: Legally      Spouse name: Not on file    Number of children: Not on file    Years of education: Not on file    Highest education level: Not on file   Occupational History    Not on file   Tobacco Use    Smoking status: Former    Smokeless tobacco: Never   Substance and Sexual Activity    Alcohol use: Yes     Comment: occasionally    Drug use: Never    Sexual activity: Not on file   Other Topics Concern    Not on file   Social History Narrative    Not on file     Social Determinants of Health     Financial Resource Strain: Not on file   Food Insecurity: Not on file   Transportation Needs: Not on file   Physical Activity: Not on file   Stress: Not on file   Social Connections: Not on file   Intimate Partner Violence: Not on file   Housing Stability: Not on file         Family History   Problem Relation Age of Onset    No Known Problems Mother     No Known Problems Father          Allergies   Allergen Reactions Sertraline Anxiety     Worsening symptoms with jaw clenching         Current Outpatient Medications   Medication Sig    LORazepam (ATIVAN) 0.5 MG tablet Take 1/2 to 1 tablet orally up to 2 times daily as needed for anxiety/panic attacks. propranolol (INDERAL) 10 MG tablet Take 1 tablet by mouth 3 times daily as needed (anxiety/panic attack)    sertraline (ZOLOFT) 50 MG tablet Take 1/2 tablet with food orally once daily for 1 week then increase to 1 tablet with food orally daily afterwards    tiZANidine (ZANAFLEX) 4 MG tablet Take 1 tablet by mouth nightly as needed (Muscle Spasm) May cause drowsiness    traZODone (DESYREL) 50 MG tablet Take 1/2 to 1 tablet orally at bedtime as needed for sleep    Rimegepant Sulfate (NURTEC) 75 MG TBDP Take 75 mg by mouth daily as needed (migraine)    vitamin D (ERGOCALCIFEROL) 1.25 MG (01801 UT) CAPS capsule TAKE 1 CAPSULE BY MOUTH EVERY SEVEN (7) DAYS. WITH FOOD    SLYND 4 MG TABS Take 1 tablet by mouth every morning     No current facility-administered medications for this visit. REVIEW OF SYSTEMS:   CONSTITUTIONAL:+persistent fatigue, or lethargy/hypersomnolence. There is no history of fever, chills, night sweats, weight loss, weight gain,     CARDIAC:   No chest pain, pressure, discomfort, palpitations, orthopnea, murmurs, or edema. GI:   No dysphagia, heartburn reflux, nausea/vomiting, diarrhea, abdominal pain, or bleeding. NEURO:   There is no history of AMS, persistent headache, decreased level of consciousness, seizures, or motor or sensory deficits. PHYSICAL EXAM:    Vitals:    03/14/23 1308   BP: 108/80   Site: Left Upper Arm   Position: Sitting   Pulse: 85   Resp: 14   Temp: 97.3 °F (36.3 °C)   TempSrc: Skin   SpO2: 98%   Weight: 215 lb (97.5 kg)   Height: 5' 9\" (1.753 m)             GENERAL APPEARANCE:   The patient is overweight and in no respiratory distress, on RA. HEENT:   PERRL. Conjunctivae unremarkable.    Nasal mucosa is without epistaxis, exudate, or polyps. Gums and dentition are unremarkable. There is moderate oropharyngeal narrowing. Mallampati III. NECK/LYMPHATIC:   Symmetrical with no elevation of jugular venous pulsation. Trachea midline. No thyroid enlargement. No cervical adenopathy. LUNGS:   Normal respiratory effort with symmetrical lung expansion. Breath sounds clear. HEART:   There is a regular rate and rhythm. No murmur, rub, or gallop. There is no edema in the lower extremities. ABDOMEN:   Soft and non-tender. No hepatosplenomegaly. Bowel sounds are normal.     NEURO:   The patient is alert and oriented to person, place, and time. Memory appears intact and mood is normal.  No gross sensorimotor deficits are present. ASSESSMENT:  (Medical Decision Making)      Diagnosis Orders   1. Complex sleep apnea syndrome -The pathophysiology of obstructive sleep apnea was reviewed with the patient. It's potential to promote severe neurologic, cardiac, pulmonary, and gastrointestinal problems was discussed. Specifically, the increased incidence of hypertension, coronary artery disease, congestive heart failure, pulmonary hypertension, gastroesophageal reflux, pathologic hypersomnolence, memory loss, and glucose intolerance was related to the consequences of hypoxemia, hypercapnia, airway obstruction, and sympathetic overdrive. We also discussed the ability of nasal CPAP to correct these abnormalities through maintenance of a patent airway. Pt to start APAP 6-16cm H2O, orders sent to [facility]. Pt is interested in the Inspire if PAP therapy doesn't work well for her. DME - DURABLE MEDICAL EQUIPMENT      2. Central sleep apnea -pt to start APAP DME - DURABLE MEDICAL EQUIPMENT      3. RILEY (obstructive sleep apnea) -start APAP  DME - DURABLE MEDICAL EQUIPMENT      4.  Nocturnal hypoxemia -should resolve with PAP therapy  DME - DURABLE MEDICAL EQUIPMENT           PLAN:      Orders Placed This Encounter Procedures    DME - DURABLE MEDICAL EQUIPMENT     [facility]  Phone: [phone] [employer] [address] [address]  Dept: [phone]      Patient Name: Sana Leblanc  : 1985  Gender: female  Address: [address] [place] [address]  Patient phone number: [phone] (home)       Primary Insurance: Payor: Rodger Gonzales / Plan: Rodger Gonzales / Product Type: *No Product type* /   Subscriber ID: [id number] - (Commercial)      AMB Supply Order  Order Details     DME Location[facility] machine   Order Date: 3/14/2023   There were no encounter diagnoses.           (  X   )New Set-Up      machine   (     ) CPAP Unit  (   x  ) Auto CPAP Unit  (     ) BiLevel Unit  (     ) Auto BiLevel Unit  (     ) ASV   (     ) Bilevel ST    (     ) Oxygen Concentrator         Length of need: 12 months    Pressure: 6-16  cmH20  EPR: 2     Starting Ramp Pressure:  15 cm H20  Ramp Time: min  4    Patient had a diagnostic Apnea Hypopnea Index (AHI) of :  44.9    *SUPPLIES* Replace all as needed, or per coverage guidelines     Masks Type:    (  x   ) -Full Face Mask (1 per 3 mon)  ( x    ) -Full Mask (1 per month) Interface/Cushion      (     ) -Nasal Mask (1 per 3 mon)  (     ) - Nasal Mask (1 per month) Interface/Cushion  (     ) -Pillow (2 per mon)  (     ) -Kivbtyrly (1 per 6 mon)      _________________________________________________________________          Other Supplies:    (  X   )-Rzwtoajt (1 per 6 mon)  ( X    )-Lksavk Tubing (1 per 3 mon)  (  X   )- Disposable Filter (2 per mon)  (   X  )-Mjodaw Humidifier (1 per year)     (  x   )-Bgslzbhgt (sometimes used with Full Face Mask) (1 per 6 mos)  (     )-Tubing-without heat (1 per 3 mos)  ( X   )-Non-Disposable Filter (1 per 6 mos)  (   x  )-Water Chamber (1 per 6 mos)  ( )-Humidifier non-heated (1 per 5 yrs)      Signed Date: 3/14/2023  Electronically Signed By: GENARO Rondon     No orders of the defined types were placed in this encounter. Collaborating Physician: Dr. Sammy Norton    Over 50% of today's office visit was spent in face to face time reviewing test results, prognosis, importance of compliance, education about disease process, benefits of medications, instructions for management of acute flare-ups, and follow up plans. Total face to face time spent with patient was 38 minutes.         GENARO Rondon  Electronically signed

## 2023-03-14 ENCOUNTER — OFFICE VISIT (OUTPATIENT)
Dept: SLEEP MEDICINE | Age: 38
End: 2023-03-14
Payer: COMMERCIAL

## 2023-03-14 VITALS
TEMPERATURE: 97.3 F | RESPIRATION RATE: 14 BRPM | BODY MASS INDEX: 31.84 KG/M2 | DIASTOLIC BLOOD PRESSURE: 80 MMHG | WEIGHT: 215 LBS | SYSTOLIC BLOOD PRESSURE: 108 MMHG | OXYGEN SATURATION: 98 % | HEIGHT: 69 IN | HEART RATE: 85 BPM

## 2023-03-14 DIAGNOSIS — G47.31 CENTRAL SLEEP APNEA: ICD-10-CM

## 2023-03-14 DIAGNOSIS — G47.31 COMPLEX SLEEP APNEA SYNDROME: Primary | ICD-10-CM

## 2023-03-14 DIAGNOSIS — G47.34 NOCTURNAL HYPOXEMIA: ICD-10-CM

## 2023-03-14 DIAGNOSIS — G47.33 OSA (OBSTRUCTIVE SLEEP APNEA): ICD-10-CM

## 2023-03-14 PROCEDURE — 99203 OFFICE O/P NEW LOW 30 MIN: CPT | Performed by: PHYSICIAN ASSISTANT

## 2023-03-14 ASSESSMENT — SLEEP AND FATIGUE QUESTIONNAIRES
HOW LIKELY ARE YOU TO NOD OFF OR FALL ASLEEP IN A CAR, WHILE STOPPED FOR A FEW MINUTES IN TRAFFIC: 1
HOW LIKELY ARE YOU TO NOD OFF OR FALL ASLEEP WHILE WATCHING TV: 3
ESS TOTAL SCORE: 16
HOW LIKELY ARE YOU TO NOD OFF OR FALL ASLEEP WHILE SITTING AND READING: 2
HOW LIKELY ARE YOU TO NOD OFF OR FALL ASLEEP WHILE SITTING INACTIVE IN A PUBLIC PLACE: 2
HOW LIKELY ARE YOU TO NOD OFF OR FALL ASLEEP WHILE SITTING QUIETLY AFTER LUNCH WITHOUT ALCOHOL: 1
HOW LIKELY ARE YOU TO NOD OFF OR FALL ASLEEP WHILE LYING DOWN TO REST IN THE AFTERNOON WHEN CIRCUMSTANCES PERMIT: 3
HOW LIKELY ARE YOU TO NOD OFF OR FALL ASLEEP WHILE SITTING AND TALKING TO SOMEONE: 1
HOW LIKELY ARE YOU TO NOD OFF OR FALL ASLEEP WHEN YOU ARE A PASSENGER IN A CAR FOR AN HOUR WITHOUT A BREAK: 3

## 2023-03-14 NOTE — PATIENT INSTRUCTIONS
The company who will be taking care of your CPAP supplies is:    Address: [address], [place] [address]  Phone: [phone] Option #1  Fax: [phone]        What is Inspire:

## 2023-05-14 DIAGNOSIS — R53.83 OTHER FATIGUE: ICD-10-CM

## 2023-05-14 DIAGNOSIS — E55.9 VITAMIN D DEFICIENCY: ICD-10-CM

## 2023-05-14 DIAGNOSIS — Z11.4 ENCOUNTER FOR SCREENING FOR HIV: Primary | ICD-10-CM

## 2023-05-14 DIAGNOSIS — F41.1 GENERALIZED ANXIETY DISORDER: ICD-10-CM

## 2023-05-14 DIAGNOSIS — Z00.00 LABORATORY EXAMINATION ORDERED AS PART OF A COMPLETE PHYSICAL EXAMINATION: ICD-10-CM

## 2023-05-15 ENCOUNTER — NURSE ONLY (OUTPATIENT)
Dept: FAMILY MEDICINE CLINIC | Facility: CLINIC | Age: 38
End: 2023-05-15
Payer: COMMERCIAL

## 2023-05-15 DIAGNOSIS — Z00.00 LABORATORY TESTS ORDERED AS PART OF A COMPLETE PHYSICAL EXAM (CPE): ICD-10-CM

## 2023-05-15 DIAGNOSIS — E55.9 VITAMIN D DEFICIENCY: ICD-10-CM

## 2023-05-15 DIAGNOSIS — Z00.00 LABORATORY EXAMINATION ORDERED AS PART OF A COMPLETE PHYSICAL EXAMINATION: ICD-10-CM

## 2023-05-15 DIAGNOSIS — Z11.4 ENCOUNTER FOR SCREENING FOR HIV: ICD-10-CM

## 2023-05-15 DIAGNOSIS — F41.1 GENERALIZED ANXIETY DISORDER: ICD-10-CM

## 2023-05-15 DIAGNOSIS — R53.83 OTHER FATIGUE: ICD-10-CM

## 2023-05-15 LAB
25(OH)D3 SERPL-MCNC: 21.6 NG/ML (ref 30–100)
ALBUMIN SERPL-MCNC: 3.8 G/DL (ref 3.5–5)
ALBUMIN/GLOB SERPL: 1.2 (ref 0.4–1.6)
ALP SERPL-CCNC: 86 U/L (ref 50–136)
ALT SERPL-CCNC: 28 U/L (ref 12–65)
ANION GAP SERPL CALC-SCNC: 3 MMOL/L (ref 2–11)
AST SERPL-CCNC: 14 U/L (ref 15–37)
BASOPHILS # BLD: 0.1 K/UL (ref 0–0.2)
BASOPHILS NFR BLD: 1 % (ref 0–2)
BILIRUB SERPL-MCNC: 0.6 MG/DL (ref 0.2–1.1)
BILIRUBIN, URINE, POC: NEGATIVE
BLOOD URINE, POC: NEGATIVE
BUN SERPL-MCNC: 10 MG/DL (ref 6–23)
CALCIUM SERPL-MCNC: 9.4 MG/DL (ref 8.3–10.4)
CHLORIDE SERPL-SCNC: 108 MMOL/L (ref 101–110)
CHOLEST SERPL-MCNC: 160 MG/DL
CO2 SERPL-SCNC: 25 MMOL/L (ref 21–32)
CREAT SERPL-MCNC: 1 MG/DL (ref 0.6–1)
DIFFERENTIAL METHOD BLD: ABNORMAL
EOSINOPHIL # BLD: 0.6 K/UL (ref 0–0.8)
EOSINOPHIL NFR BLD: 6 % (ref 0.5–7.8)
ERYTHROCYTE [DISTWIDTH] IN BLOOD BY AUTOMATED COUNT: 13 % (ref 11.9–14.6)
GLOBULIN SER CALC-MCNC: 3.1 G/DL (ref 2.8–4.5)
GLUCOSE SERPL-MCNC: 106 MG/DL (ref 65–100)
GLUCOSE URINE, POC: NEGATIVE
HCT VFR BLD AUTO: 42 % (ref 35.8–46.3)
HDLC SERPL-MCNC: 56 MG/DL (ref 40–60)
HDLC SERPL: 2.9
HGB BLD-MCNC: 13.8 G/DL (ref 11.7–15.4)
IMM GRANULOCYTES # BLD AUTO: 0 K/UL (ref 0–0.5)
IMM GRANULOCYTES NFR BLD AUTO: 0 % (ref 0–5)
KETONES, URINE, POC: NEGATIVE
LDLC SERPL CALC-MCNC: 81.2 MG/DL
LEUKOCYTE ESTERASE, URINE, POC: NEGATIVE
LYMPHOCYTES # BLD: 2.4 K/UL (ref 0.5–4.6)
LYMPHOCYTES NFR BLD: 25 % (ref 13–44)
MCH RBC QN AUTO: 29.4 PG (ref 26.1–32.9)
MCHC RBC AUTO-ENTMCNC: 32.9 G/DL (ref 31.4–35)
MCV RBC AUTO: 89.6 FL (ref 82–102)
MONOCYTES # BLD: 0.6 K/UL (ref 0.1–1.3)
MONOCYTES NFR BLD: 6 % (ref 4–12)
NEUTS SEG # BLD: 6.3 K/UL (ref 1.7–8.2)
NEUTS SEG NFR BLD: 62 % (ref 43–78)
NITRITE, URINE, POC: NEGATIVE
NRBC # BLD: 0 K/UL (ref 0–0.2)
PH, URINE, POC: 7 (ref 4.6–8)
PLATELET # BLD AUTO: 116 K/UL (ref 150–450)
PMV BLD AUTO: 9.7 FL (ref 9.4–12.3)
POTASSIUM SERPL-SCNC: 4.2 MMOL/L (ref 3.5–5.1)
PROT SERPL-MCNC: 6.9 G/DL (ref 6.3–8.2)
PROTEIN,URINE, POC: NEGATIVE
RBC # BLD AUTO: 4.69 M/UL (ref 4.05–5.2)
SODIUM SERPL-SCNC: 136 MMOL/L (ref 133–143)
SPECIFIC GRAVITY, URINE, POC: 1.02 (ref 1–1.03)
TRIGL SERPL-MCNC: 114 MG/DL (ref 35–150)
TSH W FREE THYROID IF ABNORMAL: 1.76 UIU/ML (ref 0.36–3.74)
URINALYSIS CLARITY, POC: CLEAR
URINALYSIS COLOR, POC: YELLOW
UROBILINOGEN, POC: NORMAL
VLDLC SERPL CALC-MCNC: 22.8 MG/DL (ref 6–23)
WBC # BLD AUTO: 10 K/UL (ref 4.3–11.1)

## 2023-05-15 PROCEDURE — 81002 URINALYSIS NONAUTO W/O SCOPE: CPT | Performed by: NURSE PRACTITIONER

## 2023-05-16 LAB
HIV 1+2 AB+HIV1 P24 AG SERPL QL IA: NONREACTIVE
HIV 1/2 RESULT COMMENT: NORMAL

## 2023-05-24 ENCOUNTER — TELEPHONE (OUTPATIENT)
Dept: FAMILY MEDICINE CLINIC | Facility: CLINIC | Age: 38
End: 2023-05-24

## 2023-05-24 ENCOUNTER — OFFICE VISIT (OUTPATIENT)
Dept: FAMILY MEDICINE CLINIC | Facility: CLINIC | Age: 38
End: 2023-05-24
Payer: COMMERCIAL

## 2023-05-24 VITALS
HEIGHT: 69 IN | DIASTOLIC BLOOD PRESSURE: 82 MMHG | HEART RATE: 77 BPM | BODY MASS INDEX: 33.98 KG/M2 | OXYGEN SATURATION: 98 % | WEIGHT: 229.4 LBS | SYSTOLIC BLOOD PRESSURE: 127 MMHG

## 2023-05-24 DIAGNOSIS — Z13.1 SCREENING FOR DIABETES MELLITUS: ICD-10-CM

## 2023-05-24 DIAGNOSIS — G47.33 OSA ON CPAP: ICD-10-CM

## 2023-05-24 DIAGNOSIS — Z00.00 ROUTINE GENERAL MEDICAL EXAMINATION AT A HEALTH CARE FACILITY: Primary | ICD-10-CM

## 2023-05-24 DIAGNOSIS — Z99.89 OSA ON CPAP: ICD-10-CM

## 2023-05-24 DIAGNOSIS — F32.A DEPRESSION, UNSPECIFIED DEPRESSION TYPE: ICD-10-CM

## 2023-05-24 DIAGNOSIS — E55.9 VITAMIN D DEFICIENCY: ICD-10-CM

## 2023-05-24 DIAGNOSIS — F41.1 GENERALIZED ANXIETY DISORDER: ICD-10-CM

## 2023-05-24 DIAGNOSIS — G43.009 MIGRAINE WITHOUT AURA AND WITHOUT STATUS MIGRAINOSUS, NOT INTRACTABLE: ICD-10-CM

## 2023-05-24 PROCEDURE — 99395 PREV VISIT EST AGE 18-39: CPT | Performed by: NURSE PRACTITIONER

## 2023-05-24 PROCEDURE — 99214 OFFICE O/P EST MOD 30 MIN: CPT | Performed by: NURSE PRACTITIONER

## 2023-05-24 RX ORDER — BENZTROPINE MESYLATE 1 MG/1
1 TABLET ORAL DAILY
COMMUNITY
Start: 2023-05-05

## 2023-05-24 RX ORDER — ESZOPICLONE 2 MG/1
TABLET, FILM COATED ORAL
COMMUNITY
Start: 2023-05-14

## 2023-05-24 RX ORDER — OMEPRAZOLE 40 MG/1
CAPSULE, DELAYED RELEASE ORAL
COMMUNITY
Start: 2023-02-18 | End: 2023-05-24

## 2023-05-24 RX ORDER — PHENTERMINE HYDROCHLORIDE 37.5 MG/1
37.5 TABLET ORAL
Qty: 30 TABLET | Refills: 0 | Status: SHIPPED | OUTPATIENT
Start: 2023-05-24 | End: 2023-06-23

## 2023-05-24 RX ORDER — GABAPENTIN 100 MG/1
100 CAPSULE ORAL 3 TIMES DAILY
COMMUNITY
Start: 2023-05-19

## 2023-05-24 RX ORDER — ERGOCALCIFEROL 1.25 MG/1
50000 CAPSULE ORAL
Qty: 12 CAPSULE | Refills: 0 | Status: SHIPPED | OUTPATIENT
Start: 2023-05-24

## 2023-05-24 RX ORDER — CLONAZEPAM 0.5 MG/1
TABLET ORAL
COMMUNITY
Start: 2023-05-01

## 2023-05-24 RX ORDER — ARIPIPRAZOLE 20 MG/1
20 TABLET ORAL DAILY
COMMUNITY
Start: 2023-05-05

## 2023-05-24 RX ORDER — ESCITALOPRAM OXALATE 20 MG/1
TABLET ORAL
COMMUNITY
Start: 2023-02-18 | End: 2023-05-24

## 2023-05-24 RX ORDER — SEMAGLUTIDE 0.25 MG/.5ML
0.25 INJECTION, SOLUTION SUBCUTANEOUS
Qty: 15 ML | Refills: 0 | Status: SHIPPED | OUTPATIENT
Start: 2023-05-24

## 2023-05-24 ASSESSMENT — ENCOUNTER SYMPTOMS
BACK PAIN: 0
EYES NEGATIVE: 1
RHINORRHEA: 0
APNEA: 0
NAUSEA: 0
SHORTNESS OF BREATH: 0
SINUS PRESSURE: 0
ABDOMINAL PAIN: 0
FACIAL SWELLING: 0
TROUBLE SWALLOWING: 0
COLOR CHANGE: 0
RECTAL PAIN: 0
PHOTOPHOBIA: 0
SORE THROAT: 0
WHEEZING: 0
CHEST TIGHTNESS: 0
BLOOD IN STOOL: 0
EYE DISCHARGE: 0
ABDOMINAL DISTENTION: 0
COUGH: 0
VOMITING: 0
DIARRHEA: 0
SINUS PAIN: 0
EYE REDNESS: 0
EYE PAIN: 0
VOICE CHANGE: 0
GASTROINTESTINAL NEGATIVE: 1
CHOKING: 0
ANAL BLEEDING: 0
ALLERGIC/IMMUNOLOGIC NEGATIVE: 1
RESPIRATORY NEGATIVE: 1
CONSTIPATION: 0
STRIDOR: 0
EYE ITCHING: 0

## 2023-05-24 ASSESSMENT — PATIENT HEALTH QUESTIONNAIRE - PHQ9
2. FEELING DOWN, DEPRESSED OR HOPELESS: 1
3. TROUBLE FALLING OR STAYING ASLEEP: 0
SUM OF ALL RESPONSES TO PHQ QUESTIONS 1-9: 4
SUM OF ALL RESPONSES TO PHQ9 QUESTIONS 1 & 2: 2
9. THOUGHTS THAT YOU WOULD BE BETTER OFF DEAD, OR OF HURTING YOURSELF: 0
7. TROUBLE CONCENTRATING ON THINGS, SUCH AS READING THE NEWSPAPER OR WATCHING TELEVISION: 0
SUM OF ALL RESPONSES TO PHQ QUESTIONS 1-9: 4
SUM OF ALL RESPONSES TO PHQ QUESTIONS 1-9: 4
6. FEELING BAD ABOUT YOURSELF - OR THAT YOU ARE A FAILURE OR HAVE LET YOURSELF OR YOUR FAMILY DOWN: 0
4. FEELING TIRED OR HAVING LITTLE ENERGY: 1
5. POOR APPETITE OR OVEREATING: 1
1. LITTLE INTEREST OR PLEASURE IN DOING THINGS: 1
10. IF YOU CHECKED OFF ANY PROBLEMS, HOW DIFFICULT HAVE THESE PROBLEMS MADE IT FOR YOU TO DO YOUR WORK, TAKE CARE OF THINGS AT HOME, OR GET ALONG WITH OTHER PEOPLE: 0
8. MOVING OR SPEAKING SO SLOWLY THAT OTHER PEOPLE COULD HAVE NOTICED. OR THE OPPOSITE, BEING SO FIGETY OR RESTLESS THAT YOU HAVE BEEN MOVING AROUND A LOT MORE THAN USUAL: 0
SUM OF ALL RESPONSES TO PHQ QUESTIONS 1-9: 4

## 2023-05-24 NOTE — PROGRESS NOTES
CHOLHDLRJORDYNO 2.9 05/15/2023     Lab Results   Component Value Date    TSH 2.160 05/19/2022    TSHELE 1.76 05/15/2023     Lab Results   Component Value Date/Time    VITD25 21.6 05/15/2023 10:59 AM        ASSESSMENT and PLAN    1. Routine general medical examination at a health care facility   Anticipatory guidance for age-seatbelts, avoid cell phone use in car (may use hands free), no texting while driving, avoid social drugs and tobacco, limit alcohol, fall safety, personal safety with appropriate use of helmets and equipment for protection, and appropriate use of sun screen and sun protection to prevent skin cancer. Encourage healthy diet and exercise daily. Patient is currently established with GYN but reports her last Pap smear was completed in June of last year. She does not meet criteria for breast cancer mammogram screening at this time. She is up-to-date with vaccination. 2. BMI 33.0-33.9,adult  -     WEGOVY 0.25 MG/0.5ML SOAJ SC injection; Inject 0.25 mg into the skin every 7 days, Disp-15 mL, R-0, DAWNormal  -     phentermine (ADIPEX-P) 37.5 MG tablet; Take 1 tablet by mouth every morning (before breakfast) for 30 days. Max Daily Amount: 37.5 mg, Disp-30 tablet, R-0Normal    Patient with a BMI of 33. She is noted to have an additional 15 pounds weight gain increase since March of this year. Likely due to multiple underlying contributing factors including but not limited to disruptive sleep, medication side effects etc.  She reports she continues to make dietary and lifestyle changes including healthy diet and increasing exercise regimen but having difficulty with weight loss. She became somewhat tearful due to frustration during visit. We have discussed different tools to assist with weight loss including but not limited to GLP product and appetite suppressant, their benefits versus risk and side effects.   Patient would like to start with phentermine if possible and check coverage for [facility]DANY GE

## 2023-05-25 LAB
EST. AVERAGE GLUCOSE BLD GHB EST-MCNC: 103 MG/DL
HBA1C MFR BLD: 5.2 % (ref 4.8–5.6)

## 2023-06-20 ASSESSMENT — PATIENT HEALTH QUESTIONNAIRE - PHQ9
7. TROUBLE CONCENTRATING ON THINGS, SUCH AS READING THE NEWSPAPER OR WATCHING TELEVISION: SEVERAL DAYS
10. IF YOU CHECKED OFF ANY PROBLEMS, HOW DIFFICULT HAVE THESE PROBLEMS MADE IT FOR YOU TO DO YOUR WORK, TAKE CARE OF THINGS AT HOME, OR GET ALONG WITH OTHER PEOPLE: 0
7. TROUBLE CONCENTRATING ON THINGS, SUCH AS READING THE NEWSPAPER OR WATCHING TELEVISION: 1
3. TROUBLE FALLING OR STAYING ASLEEP: NOT AT ALL
SUM OF ALL RESPONSES TO PHQ QUESTIONS 1-9: 3
SUM OF ALL RESPONSES TO PHQ QUESTIONS 1-9: 3
4. FEELING TIRED OR HAVING LITTLE ENERGY: 1
5. POOR APPETITE OR OVEREATING: 1
9. THOUGHTS THAT YOU WOULD BE BETTER OFF DEAD, OR OF HURTING YOURSELF: NOT AT ALL
SUM OF ALL RESPONSES TO PHQ QUESTIONS 1-9: 3
8. MOVING OR SPEAKING SO SLOWLY THAT OTHER PEOPLE COULD HAVE NOTICED. OR THE OPPOSITE, BEING SO FIGETY OR RESTLESS THAT YOU HAVE BEEN MOVING AROUND A LOT MORE THAN USUAL: 0
9. THOUGHTS THAT YOU WOULD BE BETTER OFF DEAD, OR OF HURTING YOURSELF: 0
3. TROUBLE FALLING OR STAYING ASLEEP: 0
SUM OF ALL RESPONSES TO PHQ QUESTIONS 1-9: 3
6. FEELING BAD ABOUT YOURSELF - OR THAT YOU ARE A FAILURE OR HAVE LET YOURSELF OR YOUR FAMILY DOWN: 0
8. MOVING OR SPEAKING SO SLOWLY THAT OTHER PEOPLE COULD HAVE NOTICED. OR THE OPPOSITE - BEING SO FIDGETY OR RESTLESS THAT YOU HAVE BEEN MOVING AROUND A LOT MORE THAN USUAL: NOT AT ALL
5. POOR APPETITE OR OVEREATING: SEVERAL DAYS
6. FEELING BAD ABOUT YOURSELF - OR THAT YOU ARE A FAILURE OR HAVE LET YOURSELF OR YOUR FAMILY DOWN: NOT AT ALL
4. FEELING TIRED OR HAVING LITTLE ENERGY: SEVERAL DAYS
10. IF YOU CHECKED OFF ANY PROBLEMS, HOW DIFFICULT HAVE THESE PROBLEMS MADE IT FOR YOU TO DO YOUR WORK, TAKE CARE OF THINGS AT HOME, OR GET ALONG WITH OTHER PEOPLE: NOT DIFFICULT AT ALL

## 2023-06-21 RX ORDER — PHENTERMINE HYDROCHLORIDE 37.5 MG/1
37.5 TABLET ORAL
Qty: 30 TABLET | Refills: 0 | OUTPATIENT
Start: 2023-06-21 | End: 2023-07-21

## 2023-06-23 ENCOUNTER — OFFICE VISIT (OUTPATIENT)
Dept: FAMILY MEDICINE CLINIC | Facility: CLINIC | Age: 38
End: 2023-06-23
Payer: COMMERCIAL

## 2023-06-23 VITALS
OXYGEN SATURATION: 99 % | HEART RATE: 101 BPM | BODY MASS INDEX: 32.34 KG/M2 | DIASTOLIC BLOOD PRESSURE: 74 MMHG | WEIGHT: 219 LBS | SYSTOLIC BLOOD PRESSURE: 118 MMHG

## 2023-06-23 DIAGNOSIS — E53.8 B12 DEFICIENCY: ICD-10-CM

## 2023-06-23 PROCEDURE — 96372 THER/PROPH/DIAG INJ SC/IM: CPT | Performed by: NURSE PRACTITIONER

## 2023-06-23 PROCEDURE — 99214 OFFICE O/P EST MOD 30 MIN: CPT | Performed by: NURSE PRACTITIONER

## 2023-06-23 RX ORDER — DIETHYLPROPION HYDROCHLORIDE 75 MG/1
1 TABLET ORAL DAILY
Qty: 30 TABLET | Refills: 0 | Status: SHIPPED | OUTPATIENT
Start: 2023-06-23 | End: 2023-07-23

## 2023-06-23 RX ORDER — CYANOCOBALAMIN 1000 UG/ML
1000 INJECTION, SOLUTION INTRAMUSCULAR; SUBCUTANEOUS ONCE
Status: COMPLETED | OUTPATIENT
Start: 2023-06-23 | End: 2023-06-23

## 2023-06-23 RX ADMIN — CYANOCOBALAMIN 1000 MCG: 1000 INJECTION, SOLUTION INTRAMUSCULAR; SUBCUTANEOUS at 08:33

## 2023-06-23 ASSESSMENT — ENCOUNTER SYMPTOMS
NAUSEA: 0
EYE REDNESS: 0
DIARRHEA: 0
ABDOMINAL PAIN: 0
RECTAL PAIN: 0
EYE DISCHARGE: 0
SORE THROAT: 0
PHOTOPHOBIA: 0
SINUS PAIN: 0
EYES NEGATIVE: 1
EYE PAIN: 0
BLOOD IN STOOL: 0
COLOR CHANGE: 0
EYE ITCHING: 0
VOMITING: 0
CHEST TIGHTNESS: 0
SHORTNESS OF BREATH: 0
WHEEZING: 0
FACIAL SWELLING: 0
VOICE CHANGE: 0
CHOKING: 0
BACK PAIN: 0
ANAL BLEEDING: 0
ALLERGIC/IMMUNOLOGIC NEGATIVE: 1
ABDOMINAL DISTENTION: 0
APNEA: 0
GASTROINTESTINAL NEGATIVE: 1
TROUBLE SWALLOWING: 0
RESPIRATORY NEGATIVE: 1
CONSTIPATION: 0
COUGH: 0
STRIDOR: 0
RHINORRHEA: 0
SINUS PRESSURE: 0

## 2023-06-23 ASSESSMENT — PATIENT HEALTH QUESTIONNAIRE - PHQ9
7. TROUBLE CONCENTRATING ON THINGS, SUCH AS READING THE NEWSPAPER OR WATCHING TELEVISION: 0
3. TROUBLE FALLING OR STAYING ASLEEP: 0
8. MOVING OR SPEAKING SO SLOWLY THAT OTHER PEOPLE COULD HAVE NOTICED. OR THE OPPOSITE, BEING SO FIGETY OR RESTLESS THAT YOU HAVE BEEN MOVING AROUND A LOT MORE THAN USUAL: 0
2. FEELING DOWN, DEPRESSED OR HOPELESS: 0
1. LITTLE INTEREST OR PLEASURE IN DOING THINGS: 0
9. THOUGHTS THAT YOU WOULD BE BETTER OFF DEAD, OR OF HURTING YOURSELF: 0
SUM OF ALL RESPONSES TO PHQ9 QUESTIONS 1 & 2: 0
10. IF YOU CHECKED OFF ANY PROBLEMS, HOW DIFFICULT HAVE THESE PROBLEMS MADE IT FOR YOU TO DO YOUR WORK, TAKE CARE OF THINGS AT HOME, OR GET ALONG WITH OTHER PEOPLE: 2
6. FEELING BAD ABOUT YOURSELF - OR THAT YOU ARE A FAILURE OR HAVE LET YOURSELF OR YOUR FAMILY DOWN: 0
5. POOR APPETITE OR OVEREATING: 0
SUM OF ALL RESPONSES TO PHQ QUESTIONS 1-9: 0
4. FEELING TIRED OR HAVING LITTLE ENERGY: 0

## 2023-06-23 NOTE — PROGRESS NOTES
PROGRESS NOTE    Chief Complaint   Patient presents with    Follow-up     On existing conditions       SUBJECTIVE:     Ashly Noguera is a very pleasant 41 y.o. female with hx of bipolar, anxiety - currently following psychiarty, migraine headache, seasonal allergies, recurrent strep throat, tonsillitis and sinusitis - currently following ENT, vitamin D deficiency, GERD, RILEY -currently on CPAP and following sleep medicine, back pain, and arthritis, seen today in office for follow-up for medication management. She was having difficulty with weight loss. She was started on phentermine last visit to assist with appetite suppressant and assist with weight. She reports doing quite well on medication and has lost 10 pounds since last office visit. She reports no sleeping issue as she is currently taking Lunesta at this time. However, she does express having a little bit more anxiety since started on phentermine. She reports no thoughts or plans of suicide or homicide. She reports no chest pain, no palpitation, no unilateral or focal weakness, no abdominal pain, no nausea, no vomiting no diarrhea, no changes in no incontinence of bladder or bowel function. Past Medical History, Past Surgical History, Family history, Social History, and Medications were all reviewed with the patient today and updated as necessary. Current Outpatient Medications   Medication Sig Dispense Refill    Diethylpropion HCl CR 75 MG TB24 Take 1 tablet by mouth daily for 30 days. Max Daily Amount: 1 tablet 30 tablet 0    ARIPiprazole (ABILIFY) 20 MG tablet Take 1 tablet by mouth daily      benztropine (COGENTIN) 1 MG tablet Take 1 tablet by mouth daily      clonazePAM (KLONOPIN) 0.5 MG tablet TAKE 1 TABLET AS NEEDED FOR SEVERE ANXIETY UP TO 1X A DAY. eszopiclone (LUNESTA) 2 MG TABS TAKE 1 TABLET BY MOUTH EVERYDAY AT BEDTIME      gabapentin (NEURONTIN) 100 MG capsule Take 1 capsule by mouth 3 times daily.       vitamin D

## 2023-07-23 ASSESSMENT — PATIENT HEALTH QUESTIONNAIRE - PHQ9
8. MOVING OR SPEAKING SO SLOWLY THAT OTHER PEOPLE COULD HAVE NOTICED. OR THE OPPOSITE, BEING SO FIGETY OR RESTLESS THAT YOU HAVE BEEN MOVING AROUND A LOT MORE THAN USUAL: 0
9. THOUGHTS THAT YOU WOULD BE BETTER OFF DEAD, OR OF HURTING YOURSELF: 0
SUM OF ALL RESPONSES TO PHQ9 QUESTIONS 1 & 2: 2
7. TROUBLE CONCENTRATING ON THINGS, SUCH AS READING THE NEWSPAPER OR WATCHING TELEVISION: 1
3. TROUBLE FALLING OR STAYING ASLEEP: 0
1. LITTLE INTEREST OR PLEASURE IN DOING THINGS: SEVERAL DAYS
5. POOR APPETITE OR OVEREATING: NOT AT ALL
7. TROUBLE CONCENTRATING ON THINGS, SUCH AS READING THE NEWSPAPER OR WATCHING TELEVISION: SEVERAL DAYS
SUM OF ALL RESPONSES TO PHQ QUESTIONS 1-9: 3
SUM OF ALL RESPONSES TO PHQ QUESTIONS 1-9: 3
5. POOR APPETITE OR OVEREATING: 0
4. FEELING TIRED OR HAVING LITTLE ENERGY: 0
SUM OF ALL RESPONSES TO PHQ QUESTIONS 1-9: 3
1. LITTLE INTEREST OR PLEASURE IN DOING THINGS: 1
4. FEELING TIRED OR HAVING LITTLE ENERGY: NOT AT ALL
8. MOVING OR SPEAKING SO SLOWLY THAT OTHER PEOPLE COULD HAVE NOTICED. OR THE OPPOSITE - BEING SO FIDGETY OR RESTLESS THAT YOU HAVE BEEN MOVING AROUND A LOT MORE THAN USUAL: NOT AT ALL
SUM OF ALL RESPONSES TO PHQ QUESTIONS 1-9: 3
2. FEELING DOWN, DEPRESSED OR HOPELESS: 1
6. FEELING BAD ABOUT YOURSELF - OR THAT YOU ARE A FAILURE OR HAVE LET YOURSELF OR YOUR FAMILY DOWN: 0
2. FEELING DOWN, DEPRESSED OR HOPELESS: SEVERAL DAYS
10. IF YOU CHECKED OFF ANY PROBLEMS, HOW DIFFICULT HAVE THESE PROBLEMS MADE IT FOR YOU TO DO YOUR WORK, TAKE CARE OF THINGS AT HOME, OR GET ALONG WITH OTHER PEOPLE: NOT DIFFICULT AT ALL
10. IF YOU CHECKED OFF ANY PROBLEMS, HOW DIFFICULT HAVE THESE PROBLEMS MADE IT FOR YOU TO DO YOUR WORK, TAKE CARE OF THINGS AT HOME, OR GET ALONG WITH OTHER PEOPLE: 0
9. THOUGHTS THAT YOU WOULD BE BETTER OFF DEAD, OR OF HURTING YOURSELF: NOT AT ALL
6. FEELING BAD ABOUT YOURSELF - OR THAT YOU ARE A FAILURE OR HAVE LET YOURSELF OR YOUR FAMILY DOWN: NOT AT ALL
SUM OF ALL RESPONSES TO PHQ QUESTIONS 1-9: 3
3. TROUBLE FALLING OR STAYING ASLEEP: NOT AT ALL

## 2023-07-24 ENCOUNTER — TELEMEDICINE (OUTPATIENT)
Dept: FAMILY MEDICINE CLINIC | Facility: CLINIC | Age: 38
End: 2023-07-24
Payer: COMMERCIAL

## 2023-07-24 DIAGNOSIS — E66.9 CLASS 1 OBESITY WITHOUT SERIOUS COMORBIDITY WITH BODY MASS INDEX (BMI) OF 32.0 TO 32.9 IN ADULT, UNSPECIFIED OBESITY TYPE: Primary | ICD-10-CM

## 2023-07-24 DIAGNOSIS — F32.A DEPRESSION, UNSPECIFIED DEPRESSION TYPE: ICD-10-CM

## 2023-07-24 DIAGNOSIS — E53.8 B12 DEFICIENCY: ICD-10-CM

## 2023-07-24 DIAGNOSIS — F41.1 GENERALIZED ANXIETY DISORDER: ICD-10-CM

## 2023-07-24 PROCEDURE — 99214 OFFICE O/P EST MOD 30 MIN: CPT | Performed by: NURSE PRACTITIONER

## 2023-07-24 RX ORDER — CARIPRAZINE 1.5 MG/1
1.5 CAPSULE, GELATIN COATED ORAL DAILY
COMMUNITY
Start: 2023-07-14

## 2023-07-24 RX ORDER — DIETHYLPROPION HYDROCHLORIDE 75 MG/1
1 TABLET ORAL
Qty: 30 TABLET | Refills: 0 | Status: SHIPPED | OUTPATIENT
Start: 2023-07-24 | End: 2023-08-23

## 2023-07-24 ASSESSMENT — ENCOUNTER SYMPTOMS
CONSTIPATION: 0
CHEST TIGHTNESS: 0
TROUBLE SWALLOWING: 0
ALLERGIC/IMMUNOLOGIC NEGATIVE: 1
SINUS PRESSURE: 0
EYE DISCHARGE: 0
CHOKING: 0
RHINORRHEA: 0
ABDOMINAL PAIN: 0
NAUSEA: 0
APNEA: 0
WHEEZING: 0
VOICE CHANGE: 0
EYE ITCHING: 0
EYE PAIN: 0
PHOTOPHOBIA: 0
BLOOD IN STOOL: 0
STRIDOR: 0
BACK PAIN: 0
COUGH: 0
DIARRHEA: 0
GASTROINTESTINAL NEGATIVE: 1
EYE REDNESS: 0
FACIAL SWELLING: 0
COLOR CHANGE: 0
VOMITING: 0
EYES NEGATIVE: 1
SHORTNESS OF BREATH: 0
ABDOMINAL DISTENTION: 0
RECTAL PAIN: 0
SINUS PAIN: 0
RESPIRATORY NEGATIVE: 1
SORE THROAT: 0
ANAL BLEEDING: 0

## 2023-07-24 NOTE — PROGRESS NOTES
Allergic/Immunologic: Negative. Negative for environmental allergies, food allergies and immunocompromised state. Neurological: Negative. Negative for dizziness, tremors, seizures, syncope, facial asymmetry, speech difficulty, weakness, light-headedness, numbness and headaches. Hematological: Negative. Negative for adenopathy. Does not bruise/bleed easily. Psychiatric/Behavioral: Negative. Negative for agitation, behavioral problems, confusion, decreased concentration, dysphoric mood, hallucinations, self-injury, sleep disturbance and suicidal ideas. The patient is not nervous/anxious and is not hyperactive. OBJECTIVE:    Patient's vital signs were not performed as encounter was performed virtually. Location of virtual visit was patient's home. Medical problems and test results were reviewed with the patient today. ASSESSMENT and PLAN    1. Class 1 obesity without serious comorbidity with body mass index (BMI) of 32.0 to 32.9 in adult, unspecified obesity type  -     Diethylpropion HCl CR 75 MG TB24; Take 1 tablet by mouth daily (with breakfast) for 30 days. Max Daily Amount: 1 tablet, Disp-30 tablet, R-0Normal    Patient reports having lost another 5 pounds since last office visit and is now down to 215 pounds. She reports no side effects with this medication and as she has had increase in anxiety symptom with phentermine previously. She did report losing more weight with phentermine but has more side effects. She reports no side effects with Tenuate. We will have patient continue current therapy as she is noticing effectiveness and has no side effects. We will follow-up in 4 weeks. 2. B12 deficiency    3. Generalized anxiety disorder  4. Depression, unspecified depression type   Stable. No recent exacerbation especially with starting Tenuate. Per psychiatry recommendation. Elements of this note have been dictated using speech recognition software.  As a

## 2023-08-22 ENCOUNTER — OFFICE VISIT (OUTPATIENT)
Dept: SLEEP MEDICINE | Age: 38
End: 2023-08-22
Payer: COMMERCIAL

## 2023-08-22 VITALS
DIASTOLIC BLOOD PRESSURE: 76 MMHG | HEART RATE: 117 BPM | SYSTOLIC BLOOD PRESSURE: 120 MMHG | WEIGHT: 210.4 LBS | HEIGHT: 68 IN | BODY MASS INDEX: 31.89 KG/M2 | OXYGEN SATURATION: 100 %

## 2023-08-22 DIAGNOSIS — G47.34 NOCTURNAL HYPOXEMIA: ICD-10-CM

## 2023-08-22 DIAGNOSIS — Z78.9 DIFFICULTY USING CONTINUOUS POSITIVE AIRWAY PRESSURE (CPAP) DEVICE: ICD-10-CM

## 2023-08-22 DIAGNOSIS — G47.33 OSA (OBSTRUCTIVE SLEEP APNEA): Primary | ICD-10-CM

## 2023-08-22 PROCEDURE — 99214 OFFICE O/P EST MOD 30 MIN: CPT | Performed by: PHYSICIAN ASSISTANT

## 2023-08-22 ASSESSMENT — SLEEP AND FATIGUE QUESTIONNAIRES
HOW LIKELY ARE YOU TO NOD OFF OR FALL ASLEEP WHILE WATCHING TV: 2
HOW LIKELY ARE YOU TO NOD OFF OR FALL ASLEEP WHEN YOU ARE A PASSENGER IN A CAR FOR AN HOUR WITHOUT A BREAK: 3
HOW LIKELY ARE YOU TO NOD OFF OR FALL ASLEEP WHILE SITTING AND READING: 0
HOW LIKELY ARE YOU TO NOD OFF OR FALL ASLEEP WHILE SITTING AND TALKING TO SOMEONE: 0
HOW LIKELY ARE YOU TO NOD OFF OR FALL ASLEEP WHILE SITTING INACTIVE IN A PUBLIC PLACE: 1
HOW LIKELY ARE YOU TO NOD OFF OR FALL ASLEEP WHILE LYING DOWN TO REST IN THE AFTERNOON WHEN CIRCUMSTANCES PERMIT: 2
HOW LIKELY ARE YOU TO NOD OFF OR FALL ASLEEP WHILE SITTING QUIETLY AFTER LUNCH WITHOUT ALCOHOL: 0
ESS TOTAL SCORE: 8
HOW LIKELY ARE YOU TO NOD OFF OR FALL ASLEEP IN A CAR, WHILE STOPPED FOR A FEW MINUTES IN TRAFFIC: 0

## 2023-08-22 NOTE — PROGRESS NOTES
breakfast) for 30 days. Max Daily Amount: 1 tablet    ARIPiprazole (ABILIFY) 20 MG tablet Take 1 tablet by mouth daily    benztropine (COGENTIN) 1 MG tablet Take 1 tablet by mouth daily    clonazePAM (KLONOPIN) 0.5 MG tablet TAKE 1 TABLET AS NEEDED FOR SEVERE ANXIETY UP TO 1X A DAY. eszopiclone (LUNESTA) 2 MG TABS TAKE 1 TABLET BY MOUTH EVERYDAY AT BEDTIME    vitamin D (ERGOCALCIFEROL) 1.25 MG (90113 UT) CAPS capsule Take 1 capsule by mouth every 7 days With food for vitamin D supplementation    tiZANidine (ZANAFLEX) 4 MG tablet Take 1 tablet by mouth nightly as needed (Muscle Spasm) May cause drowsiness    Rimegepant Sulfate (NURTEC) 75 MG TBDP Take 75 mg by mouth daily as needed (migraine)    SLYND 4 MG TABS Take 1 tablet by mouth every morning     No current facility-administered medications for this visit. REVIEW OF SYSTEMS:   CONSTITUTIONAL:   There is no history of fever, chills, night sweats, weight loss, weight gain, persistent fatigue, or lethargy/hypersomnolence. CARDIAC:   No chest pain, pressure, discomfort, palpitations, orthopnea, murmurs, or edema. GI:   No dysphagia, heartburn reflux, nausea/vomiting, diarrhea, abdominal pain, or bleeding. NEURO:   There is no history of AMS, persistent headache, decreased level of consciousness, seizures, or motor or sensory deficits. PHYSICAL EXAM:    Vitals:    08/22/23 1132   BP: 120/76   Pulse: (!) 117   SpO2: 100%   Weight: 210 lb 6.4 oz (95.4 kg)   Height: 5' 8\" (1.727 m)             GENERAL APPEARANCE:   The patient is overweight and in no respiratory distress, on RA   HEENT:   PERRL. Conjunctivae unremarkable. Nasal mucosa is without epistaxis, exudate, or polyps. Gums and dentition are unremarkable. NECK/LYMPHATIC:   Symmetrical with no elevation of jugular venous pulsation. Trachea midline. No thyroid enlargement. No cervical adenopathy. LUNGS:   Normal respiratory effort with symmetrical lung expansion.    Breath sounds

## 2023-08-25 ENCOUNTER — OFFICE VISIT (OUTPATIENT)
Dept: FAMILY MEDICINE CLINIC | Facility: CLINIC | Age: 38
End: 2023-08-25
Payer: COMMERCIAL

## 2023-08-25 VITALS
OXYGEN SATURATION: 100 % | DIASTOLIC BLOOD PRESSURE: 70 MMHG | BODY MASS INDEX: 31.89 KG/M2 | HEART RATE: 89 BPM | SYSTOLIC BLOOD PRESSURE: 116 MMHG | HEIGHT: 68 IN | WEIGHT: 210.4 LBS

## 2023-08-25 DIAGNOSIS — E66.9 CLASS 1 OBESITY WITHOUT SERIOUS COMORBIDITY WITH BODY MASS INDEX (BMI) OF 32.0 TO 32.9 IN ADULT, UNSPECIFIED OBESITY TYPE: Primary | ICD-10-CM

## 2023-08-25 DIAGNOSIS — J01.81 OTHER ACUTE RECURRENT SINUSITIS: ICD-10-CM

## 2023-08-25 PROCEDURE — 99214 OFFICE O/P EST MOD 30 MIN: CPT | Performed by: NURSE PRACTITIONER

## 2023-08-25 RX ORDER — LAMOTRIGINE 25 MG/1
TABLET ORAL
COMMUNITY
Start: 2023-08-23

## 2023-08-25 RX ORDER — CEFDINIR 300 MG/1
300 CAPSULE ORAL 2 TIMES DAILY
Qty: 20 CAPSULE | Refills: 0 | Status: SHIPPED | OUTPATIENT
Start: 2023-08-25 | End: 2023-09-04

## 2023-08-25 RX ORDER — DIETHYLPROPION HYDROCHLORIDE 75 MG/1
1 TABLET ORAL DAILY
Qty: 30 TABLET | Refills: 0 | Status: SHIPPED | OUTPATIENT
Start: 2023-08-25 | End: 2023-09-24

## 2023-08-25 ASSESSMENT — ENCOUNTER SYMPTOMS
CONSTIPATION: 0
ALLERGIC/IMMUNOLOGIC NEGATIVE: 1
RECTAL PAIN: 0
PHOTOPHOBIA: 0
ABDOMINAL DISTENTION: 0
WHEEZING: 0
BLOOD IN STOOL: 0
EYE REDNESS: 0
VOMITING: 0
EYE DISCHARGE: 0
SINUS PAIN: 1
ANAL BLEEDING: 0
COLOR CHANGE: 0
SINUS PRESSURE: 1
RHINORRHEA: 0
EYE PAIN: 0
EYES NEGATIVE: 1
TROUBLE SWALLOWING: 0
CHOKING: 0
VOICE CHANGE: 0
GASTROINTESTINAL NEGATIVE: 1
BACK PAIN: 0
EYE ITCHING: 0
COUGH: 1
SORE THROAT: 0
DIARRHEA: 0
CHEST TIGHTNESS: 1
NAUSEA: 0
FACIAL SWELLING: 0
ABDOMINAL PAIN: 0
SHORTNESS OF BREATH: 0
APNEA: 0
STRIDOR: 0

## 2023-08-25 NOTE — PROGRESS NOTES
PROGRESS NOTE    Chief Complaint   Patient presents with    Follow-up     On existing conditions    Medication Check       SUBJECTIVE:     Hyun Arias is a very pleasant 41 y.o. female with hx of  bipolar, anxiety - currently following psychiarty, migraine headache, seasonal allergies, recurrent strep throat, tonsillitis and sinusitis - currently following ENT, vitamin D deficiency, GERD, RILEY -currently on CPAP and following sleep medicine, back pain, and arthritis,  seen today in office follow-up. She is currently taking Tenuate and has lost another 5 pounds. She reports feeling well has no side effects. She does feel that medication is plateauing and she is not losing as much weight as she was in the past.  She reports that she does continue to struggle with her CPAP due to the mask fitting despite trying several different mask. She reports she has an appointment on Monday for evaluation for inspire implant with ENT. She also has complaints of sinus congestion and drainage that has been present intermittently but been productive for about 1 week. She reports no fever or chills, no chest pain, no shortness of breath no palpitation, no unilateral focal weakness, no abdominal pain, no nausea, no vomiting no diarrhea, no changes in no incontinence of bladder or bowel function. Past Medical History, Past Surgical History, Family history, Social History, and Medications were all reviewed with the patient today and updated as necessary. Current Outpatient Medications   Medication Sig Dispense Refill    norethindrone (AYGESTIN) 5 MG tablet START 3 DAYS BEFORE YOUR EXPECTED PERIOD.  TAKE 1 TABLET BY MOUTH THREE TIMES A DAY FOR 10 DAYS.      lamoTRIgine (LAMICTAL) 25 MG tablet       VRAYLAR 1.5 MG capsule Take 1 capsule by mouth daily      ARIPiprazole (ABILIFY) 20 MG tablet Take 1 tablet by mouth daily      benztropine (COGENTIN) 1 MG tablet Take 1 tablet by mouth daily      clonazePAM (KLONOPIN)

## 2023-08-28 ENCOUNTER — OFFICE VISIT (OUTPATIENT)
Dept: ENT CLINIC | Age: 38
End: 2023-08-28
Payer: COMMERCIAL

## 2023-08-28 VITALS — BODY MASS INDEX: 31.52 KG/M2 | WEIGHT: 208 LBS | RESPIRATION RATE: 18 BRPM | HEIGHT: 68 IN

## 2023-08-28 DIAGNOSIS — Z99.89 OSA ON CPAP: Primary | ICD-10-CM

## 2023-08-28 DIAGNOSIS — G47.33 OSA ON CPAP: Primary | ICD-10-CM

## 2023-08-28 PROCEDURE — 99244 OFF/OP CNSLTJ NEW/EST MOD 40: CPT | Performed by: OTOLARYNGOLOGY

## 2023-08-28 ASSESSMENT — ENCOUNTER SYMPTOMS
SORE THROAT: 0
SHORTNESS OF BREATH: 0
ABDOMINAL PAIN: 0

## 2023-08-28 NOTE — PROGRESS NOTES
Chief Complaint   Patient presents with    Sleep Apnea     Patient states that she has done the Cpap and that it doesn't help so she is here to see about getting the [facility] CEDRIC implant. HPI:  Flavio Lockwood is a 41 y.o. female seen today in initial consultation for RILEY. She has been snoring for years with daytime fatigue and was worked up recently with a PSG which revealed severe RILEY with an AHI of 44.9. She was fit for CPAP mask, but has had trouble tolerating the mask. She underwent previous septoplasty and tonsillectomy with Dr. Sravani Rodriguez back in October 2022. She was seen recently at the sleep lab, and they discussed the [facility] CEDRIC implant. Past Medical History, Past Surgical History, Family history, Social History, and Medications were all reviewed with the patient today and updated as necessary. Allergies   Allergen Reactions    Sertraline Anxiety     Worsening symptoms with jaw clenching     Patient Active Problem List   Diagnosis    Back pain    Throat pain    Throat infection    Migraine with aura     Current Outpatient Medications   Medication Sig    norethindrone (AYGESTIN) 5 MG tablet START 3 DAYS BEFORE YOUR EXPECTED PERIOD. TAKE 1 TABLET BY MOUTH THREE TIMES A DAY FOR 10 DAYS.    lamoTRIgine (LAMICTAL) 25 MG tablet     Diethylpropion HCl CR 75 MG TB24 Take 1 tablet by mouth daily for 30 days. Max Daily Amount: 1 tablet    cefdinir (OMNICEF) 300 MG capsule Take 1 capsule by mouth 2 times daily for 10 days With food    VRAYLAR 1.5 MG capsule Take 1 capsule by mouth daily    ARIPiprazole (ABILIFY) 20 MG tablet Take 1 tablet by mouth daily    benztropine (COGENTIN) 1 MG tablet Take 1 tablet by mouth daily    clonazePAM (KLONOPIN) 0.5 MG tablet TAKE 1 TABLET AS NEEDED FOR SEVERE ANXIETY UP TO 1X A DAY.     eszopiclone (LUNESTA) 2 MG TABS TAKE 1 TABLET BY MOUTH EVERYDAY AT BEDTIME    vitamin D (ERGOCALCIFEROL) 1.25 MG (28458 UT) CAPS capsule Take 1 capsule by mouth every 7 days With food for

## 2023-09-28 ENCOUNTER — TELEPHONE (OUTPATIENT)
Dept: FAMILY MEDICINE CLINIC | Facility: CLINIC | Age: 38
End: 2023-09-28

## 2023-09-28 ENCOUNTER — OFFICE VISIT (OUTPATIENT)
Dept: FAMILY MEDICINE CLINIC | Facility: CLINIC | Age: 38
End: 2023-09-28
Payer: COMMERCIAL

## 2023-09-28 VITALS
BODY MASS INDEX: 31.46 KG/M2 | OXYGEN SATURATION: 98 % | HEIGHT: 68 IN | SYSTOLIC BLOOD PRESSURE: 110 MMHG | HEART RATE: 108 BPM | DIASTOLIC BLOOD PRESSURE: 80 MMHG | WEIGHT: 207.6 LBS

## 2023-09-28 DIAGNOSIS — E66.9 CLASS 1 OBESITY WITHOUT SERIOUS COMORBIDITY WITH BODY MASS INDEX (BMI) OF 32.0 TO 32.9 IN ADULT, UNSPECIFIED OBESITY TYPE: Primary | ICD-10-CM

## 2023-09-28 DIAGNOSIS — J34.89 SINUS PRESSURE: ICD-10-CM

## 2023-09-28 DIAGNOSIS — J30.89 OTHER ALLERGIC RHINITIS: ICD-10-CM

## 2023-09-28 PROCEDURE — 99214 OFFICE O/P EST MOD 30 MIN: CPT | Performed by: NURSE PRACTITIONER

## 2023-09-28 RX ORDER — DIETHYLPROPION HYDROCHLORIDE 75 MG/1
1 TABLET ORAL DAILY
Qty: 30 TABLET | Refills: 0 | Status: SHIPPED | OUTPATIENT
Start: 2023-09-28 | End: 2023-10-28

## 2023-09-28 RX ORDER — AZELASTINE 1 MG/ML
1 SPRAY, METERED NASAL 2 TIMES DAILY
Qty: 60 ML | Refills: 1 | Status: SHIPPED | OUTPATIENT
Start: 2023-09-28

## 2023-09-28 RX ORDER — TRIAMCINOLONE ACETONIDE 55 UG/1
1 SPRAY, METERED NASAL 2 TIMES DAILY
Qty: 1 EACH | Refills: 3 | Status: SHIPPED | OUTPATIENT
Start: 2023-09-28

## 2023-09-28 ASSESSMENT — ENCOUNTER SYMPTOMS
CONSTIPATION: 0
DIARRHEA: 0
ABDOMINAL PAIN: 0
COUGH: 0
BACK PAIN: 0
FACIAL SWELLING: 0
CHOKING: 0
COLOR CHANGE: 0
GASTROINTESTINAL NEGATIVE: 1
APNEA: 0
NAUSEA: 0
ALLERGIC/IMMUNOLOGIC NEGATIVE: 1
SORE THROAT: 0
ABDOMINAL DISTENTION: 0
STRIDOR: 0
EYE DISCHARGE: 0
PHOTOPHOBIA: 0
RECTAL PAIN: 0
BLOOD IN STOOL: 0
TROUBLE SWALLOWING: 0
EYES NEGATIVE: 1
CHEST TIGHTNESS: 0
EYE REDNESS: 0
EYE PAIN: 0
ANAL BLEEDING: 0
RHINORRHEA: 0
SINUS PAIN: 1
WHEEZING: 0
VOMITING: 0
VOICE CHANGE: 0
SHORTNESS OF BREATH: 0
SINUS PRESSURE: 1
EYE ITCHING: 0

## 2023-09-28 NOTE — PROGRESS NOTES
PROGRESS NOTE    Chief Complaint   Patient presents with    Follow-up     Existing conditions       SUBJECTIVE:     Naseem Raza is a very pleasant 41 y.o. female with hx of bipolar, anxiety - currently following psychiarty, migraine headache, seasonal allergies, recurrent strep throat, tonsillitis and sinusitis - currently following ENT, vitamin D deficiency, GERD, RILEY -currently on CPAP and following sleep medicine, back pain, and arthritis, seen today in office for follow-up. Patient reports doing quite well on Tenuate at this time. She is not losing weight in comparison to when she was taking Adipex. However, she was unable to tolerate medication due to side effects. She does have a history of anxiety and depression and is currently following psychiatry. Patient will benefit with a GLP product but due to insurance coverage logistics, she was unable to start therapy initially. We will retrial again. She also has complaints of having continuous sinus pressure and congestion but no sputum production or fever or chills. Past Medical History, Past Surgical History, Family history, Social History, and Medications were all reviewed with the patient today and updated as necessary. Current Outpatient Medications   Medication Sig Dispense Refill    azelastine (ASTELIN) 0.1 % nasal spray 1 spray by Nasal route 2 times daily Use in each nostril as directed 60 mL 1    triamcinolone (NASACORT) 55 MCG/ACT nasal inhaler 1 spray by Each Nostril route 2 times daily 1 each 3    Semaglutide-Weight Management (WEGOVY) 0.25 MG/0.5ML SOAJ SC injection Inject 0.25 mg into the skin every 7 days 15 mL 0    Diethylpropion HCl CR 75 MG TB24 Take 1 tablet by mouth daily for 30 days.  Max Daily Amount: 1 tablet 30 tablet 0    lamoTRIgine (LAMICTAL) 25 MG tablet       VRAYLAR 1.5 MG capsule Take 1 capsule by mouth daily      ARIPiprazole (ABILIFY) 20 MG tablet Take 1 tablet by mouth daily      eszopiclone (LUNESTA) 2

## 2023-09-28 NOTE — TELEPHONE ENCOUNTER
RX for Abiodun Inch. Pt has tried adipex - unable to tolerate due to anxiety and side effects. Has taken Tenuate but not able to continue - no results. Please check for coverage for wegovy. ICD 10 code E66.9. Thank you!

## 2023-11-01 ENCOUNTER — OFFICE VISIT (OUTPATIENT)
Dept: FAMILY MEDICINE CLINIC | Facility: CLINIC | Age: 38
End: 2023-11-01
Payer: COMMERCIAL

## 2023-11-01 VITALS
HEART RATE: 68 BPM | BODY MASS INDEX: 31.67 KG/M2 | OXYGEN SATURATION: 97 % | SYSTOLIC BLOOD PRESSURE: 110 MMHG | HEIGHT: 68 IN | WEIGHT: 209 LBS | DIASTOLIC BLOOD PRESSURE: 70 MMHG

## 2023-11-01 DIAGNOSIS — E66.9 CLASS 1 OBESITY WITHOUT SERIOUS COMORBIDITY IN ADULT, UNSPECIFIED BMI, UNSPECIFIED OBESITY TYPE: Primary | ICD-10-CM

## 2023-11-01 DIAGNOSIS — J30.89 OTHER ALLERGIC RHINITIS: ICD-10-CM

## 2023-11-01 DIAGNOSIS — H69.91 DYSFUNCTION OF RIGHT EUSTACHIAN TUBE: ICD-10-CM

## 2023-11-01 PROBLEM — R07.0 THROAT PAIN: Status: RESOLVED | Noted: 2022-05-27 | Resolved: 2023-11-01

## 2023-11-01 PROBLEM — J02.9 THROAT INFECTION: Status: RESOLVED | Noted: 2022-05-27 | Resolved: 2023-11-01

## 2023-11-01 PROCEDURE — 99214 OFFICE O/P EST MOD 30 MIN: CPT | Performed by: NURSE PRACTITIONER

## 2023-11-01 RX ORDER — LAMOTRIGINE 100 MG/1
100 TABLET ORAL DAILY
COMMUNITY
Start: 2023-10-18

## 2023-11-01 RX ORDER — PHENTERMINE HYDROCHLORIDE 37.5 MG/1
37.5 TABLET ORAL
Qty: 30 TABLET | Refills: 0 | Status: SHIPPED | OUTPATIENT
Start: 2023-11-01 | End: 2023-12-01

## 2023-11-01 RX ORDER — PREDNISONE 20 MG/1
TABLET ORAL
Qty: 11 TABLET | Refills: 0 | Status: SHIPPED | OUTPATIENT
Start: 2023-11-01

## 2023-11-01 RX ORDER — MULTIVIT-MIN/IRON/FOLIC ACID/K 18-600-40
CAPSULE ORAL
COMMUNITY

## 2023-11-01 ASSESSMENT — ENCOUNTER SYMPTOMS
DIARRHEA: 0
EYE REDNESS: 0
EYE ITCHING: 0
COLOR CHANGE: 0
BLOOD IN STOOL: 0
ANAL BLEEDING: 0
EYES NEGATIVE: 1
TROUBLE SWALLOWING: 0
VOICE CHANGE: 0
RHINORRHEA: 0
CHOKING: 0
EYE PAIN: 0
SINUS PAIN: 1
EYE DISCHARGE: 0
CONSTIPATION: 0
PHOTOPHOBIA: 0
STRIDOR: 0
ABDOMINAL DISTENTION: 0
ALLERGIC/IMMUNOLOGIC NEGATIVE: 1
RECTAL PAIN: 0
COUGH: 0
SINUS PRESSURE: 1
NAUSEA: 0
CHEST TIGHTNESS: 0
VOMITING: 0
APNEA: 0
WHEEZING: 0
SORE THROAT: 0
FACIAL SWELLING: 0
GASTROINTESTINAL NEGATIVE: 1
RESPIRATORY NEGATIVE: 1
ABDOMINAL PAIN: 0
BACK PAIN: 0
SHORTNESS OF BREATH: 0

## 2023-11-28 DIAGNOSIS — G43.009 MIGRAINE WITHOUT AURA AND WITHOUT STATUS MIGRAINOSUS, NOT INTRACTABLE: ICD-10-CM

## 2023-11-28 RX ORDER — RIMEGEPANT SULFATE 75 MG/75MG
75 TABLET, ORALLY DISINTEGRATING ORAL DAILY PRN
Qty: 8 TABLET | Refills: 44 | Status: SHIPPED | OUTPATIENT
Start: 2023-11-28

## 2023-11-29 ENCOUNTER — OFFICE VISIT (OUTPATIENT)
Dept: FAMILY MEDICINE CLINIC | Facility: CLINIC | Age: 38
End: 2023-11-29
Payer: COMMERCIAL

## 2023-11-29 VITALS
HEART RATE: 108 BPM | DIASTOLIC BLOOD PRESSURE: 82 MMHG | SYSTOLIC BLOOD PRESSURE: 108 MMHG | OXYGEN SATURATION: 98 % | WEIGHT: 212 LBS | HEIGHT: 68 IN | BODY MASS INDEX: 32.13 KG/M2

## 2023-11-29 DIAGNOSIS — J30.89 OTHER ALLERGIC RHINITIS: ICD-10-CM

## 2023-11-29 DIAGNOSIS — E66.9 CLASS 1 OBESITY WITHOUT SERIOUS COMORBIDITY WITH BODY MASS INDEX (BMI) OF 32.0 TO 32.9 IN ADULT, UNSPECIFIED OBESITY TYPE: Primary | ICD-10-CM

## 2023-11-29 PROCEDURE — 99214 OFFICE O/P EST MOD 30 MIN: CPT | Performed by: NURSE PRACTITIONER

## 2023-11-29 RX ORDER — PHENDIMETRAZINE TARTRATE 35 MG/1
1 TABLET ORAL 3 TIMES DAILY
Qty: 90 TABLET | Refills: 0 | Status: SHIPPED | OUTPATIENT
Start: 2023-11-29 | End: 2023-12-29

## 2023-11-29 ASSESSMENT — ENCOUNTER SYMPTOMS
BLOOD IN STOOL: 0
EYE REDNESS: 0
ANAL BLEEDING: 0
BACK PAIN: 0
GASTROINTESTINAL NEGATIVE: 1
APNEA: 0
COLOR CHANGE: 0
EYES NEGATIVE: 1
DIARRHEA: 0
RECTAL PAIN: 0
VOICE CHANGE: 0
EYE PAIN: 0
EYE ITCHING: 0
PHOTOPHOBIA: 0
TROUBLE SWALLOWING: 0
SHORTNESS OF BREATH: 0
ABDOMINAL PAIN: 0
SORE THROAT: 0
COUGH: 0
RHINORRHEA: 0
VOMITING: 0
CHEST TIGHTNESS: 0
EYE DISCHARGE: 0
NAUSEA: 0
SINUS PRESSURE: 1
FACIAL SWELLING: 0
WHEEZING: 0
SINUS PAIN: 1
ALLERGIC/IMMUNOLOGIC NEGATIVE: 1
ABDOMINAL DISTENTION: 0
CHOKING: 0
STRIDOR: 0
CONSTIPATION: 0

## 2023-11-29 NOTE — PROGRESS NOTES
PROGRESS NOTE    Chief Complaint   Patient presents with    Follow-up     Existing conditions       SUBJECTIVE:     Andrew Moran is a very pleasant 41 y.o. female with hx of  bipolar, anxiety - currently following psychiarty, migraine headache, seasonal allergies, recurrent strep throat, tonsillitis and sinusitis - currently following ENT, vitamin D deficiency, GERD, RILEY -currently on CPAP and following sleep medicine, back pain, and arthritis, seen today in office for follow-up for medication management. She has been not having much success with medication to assist with weight loss. She was switched to Adipex and feels that medication did not help. She has no side effects however. She feels that her anxiety are still getting adjusted with medication currently. She reports she is currently working with her psychiatrist for management. She continues to also experience sinus pressure, bilateral ear fullness with right worse than left and also congestion as well as a postnasal drip. She reports she is currently taking her allergy medications including Nasacort, Astelin and loratadine daily with minimal relief. She reports no fever or chills and no productivity. Past Medical History, Past Surgical History, Family history, Social History, and Medications were all reviewed with the patient today and updated as necessary. Current Outpatient Medications   Medication Sig Dispense Refill    Phendimetrazine Tartrate 35 MG TABS Take 1 tablet by mouth 3 times daily for 30 days.  Max Daily Amount: 3 tablets 90 tablet 0    NURTEC 75 MG TBDP TAKE 75 MG BY MOUTH DAILY AS NEEDED (MIGRAINE) 8 tablet 44    lamoTRIgine (LAMICTAL) 100 MG tablet Take 1 tablet by mouth daily      Cholecalciferol (VITAMIN D) 50 MCG (2000 UT) CAPS capsule Take by mouth      loratadine-pseudoephedrine (CLARITIN-D 12HR) 5-120 MG per extended release tablet Take 1 tablet by mouth 2 times daily For allergies and decongestion 60 tablet

## 2023-12-26 ENCOUNTER — OFFICE VISIT (OUTPATIENT)
Dept: FAMILY MEDICINE CLINIC | Facility: CLINIC | Age: 38
End: 2023-12-26
Payer: COMMERCIAL

## 2023-12-26 VITALS
DIASTOLIC BLOOD PRESSURE: 70 MMHG | HEART RATE: 102 BPM | OXYGEN SATURATION: 98 % | WEIGHT: 215 LBS | BODY MASS INDEX: 32.58 KG/M2 | HEIGHT: 68 IN | SYSTOLIC BLOOD PRESSURE: 106 MMHG

## 2023-12-26 DIAGNOSIS — F90.9 ATTENTION DEFICIT HYPERACTIVITY DISORDER (ADHD), UNSPECIFIED ADHD TYPE: Primary | ICD-10-CM

## 2023-12-26 DIAGNOSIS — E66.9 CLASS 1 OBESITY WITHOUT SERIOUS COMORBIDITY WITH BODY MASS INDEX (BMI) OF 32.0 TO 32.9 IN ADULT, UNSPECIFIED OBESITY TYPE: ICD-10-CM

## 2023-12-26 PROCEDURE — 99214 OFFICE O/P EST MOD 30 MIN: CPT | Performed by: NURSE PRACTITIONER

## 2023-12-26 RX ORDER — DEXTROAMPHETAMINE SACCHARATE, AMPHETAMINE ASPARTATE, DEXTROAMPHETAMINE SULFATE AND AMPHETAMINE SULFATE 5; 5; 5; 5 MG/1; MG/1; MG/1; MG/1
20 TABLET ORAL DAILY
Qty: 30 TABLET | Refills: 0 | Status: SHIPPED | OUTPATIENT
Start: 2023-12-26 | End: 2024-01-25

## 2023-12-26 NOTE — PROGRESS NOTES
type   Increased weight increases ASCVD risk for stroke, heart attack and heart disease along with other health risk including but not limited to joints and back pain. Pt was advised on healthy diet that is low in fat, low in cholesterol and low in carbohydrates. Pt was advised on increase in vegetables and increase in exercise activity of moderate intensity of at least 150 minutes per week. Elements of this note have been dictated using speech recognition software. As a result, errors of speech recognition may have occurred. On this date 12/26/2023 I have spent 31 minutes reviewing previous notes, test results and face to face with the patient discussing the diagnosis and importance of compliance with the treatment plan as well as documenting on the day of the visit. Greater than 50% of this visit was spent counseling the patient about test results, prognosis, importance of compliance, education about disease process, benefits of medications, instructions for management of acute symptoms, and follow up plans. Return in about 4 weeks (around 1/23/2024) for Medication follow up in office.      TIARA Lamar - CNP

## 2024-01-17 ENCOUNTER — TELEPHONE (OUTPATIENT)
Dept: FAMILY MEDICINE CLINIC | Facility: CLINIC | Age: 39
End: 2024-01-17

## 2024-01-17 DIAGNOSIS — G43.009 MIGRAINE WITHOUT AURA AND WITHOUT STATUS MIGRAINOSUS, NOT INTRACTABLE: ICD-10-CM

## 2024-01-24 ENCOUNTER — OFFICE VISIT (OUTPATIENT)
Dept: FAMILY MEDICINE CLINIC | Facility: CLINIC | Age: 39
End: 2024-01-24
Payer: COMMERCIAL

## 2024-01-24 VITALS
DIASTOLIC BLOOD PRESSURE: 82 MMHG | BODY MASS INDEX: 30.92 KG/M2 | OXYGEN SATURATION: 99 % | WEIGHT: 204 LBS | HEART RATE: 86 BPM | HEIGHT: 68 IN | SYSTOLIC BLOOD PRESSURE: 110 MMHG

## 2024-01-24 DIAGNOSIS — F90.9 ATTENTION DEFICIT HYPERACTIVITY DISORDER (ADHD), UNSPECIFIED ADHD TYPE: Primary | ICD-10-CM

## 2024-01-24 DIAGNOSIS — J30.89 OTHER ALLERGIC RHINITIS: ICD-10-CM

## 2024-01-24 DIAGNOSIS — G43.009 MIGRAINE WITHOUT AURA AND WITHOUT STATUS MIGRAINOSUS, NOT INTRACTABLE: ICD-10-CM

## 2024-01-24 PROCEDURE — 99214 OFFICE O/P EST MOD 30 MIN: CPT | Performed by: NURSE PRACTITIONER

## 2024-01-24 RX ORDER — SUVOREXANT 10 MG/1
1 TABLET, FILM COATED ORAL
COMMUNITY
Start: 2023-12-29

## 2024-01-24 RX ORDER — DEXTROAMPHETAMINE SACCHARATE, AMPHETAMINE ASPARTATE MONOHYDRATE, DEXTROAMPHETAMINE SULFATE AND AMPHETAMINE SULFATE 5; 5; 5; 5 MG/1; MG/1; MG/1; MG/1
20 CAPSULE, EXTENDED RELEASE ORAL EVERY MORNING
Qty: 30 CAPSULE | Refills: 0 | Status: SHIPPED | OUTPATIENT
Start: 2024-01-24 | End: 2024-02-23

## 2024-01-24 ASSESSMENT — ENCOUNTER SYMPTOMS
PHOTOPHOBIA: 0
ABDOMINAL PAIN: 0
SINUS PRESSURE: 0
APNEA: 0
ANAL BLEEDING: 0
RHINORRHEA: 0
RESPIRATORY NEGATIVE: 1
FACIAL SWELLING: 0
EYE REDNESS: 0
ABDOMINAL DISTENTION: 0
EYE PAIN: 0
VOICE CHANGE: 0
RECTAL PAIN: 0
CHOKING: 0
STRIDOR: 0
SORE THROAT: 0
CONSTIPATION: 0
SHORTNESS OF BREATH: 0
COUGH: 0
CHEST TIGHTNESS: 0
WHEEZING: 0
COLOR CHANGE: 0
EYE ITCHING: 0
GASTROINTESTINAL NEGATIVE: 1
ALLERGIC/IMMUNOLOGIC NEGATIVE: 1
NAUSEA: 0
BACK PAIN: 0
VOMITING: 0
TROUBLE SWALLOWING: 0
EYE DISCHARGE: 0
EYES NEGATIVE: 1
DIARRHEA: 0
SINUS PAIN: 0

## 2024-01-24 ASSESSMENT — PATIENT HEALTH QUESTIONNAIRE - PHQ9
SUM OF ALL RESPONSES TO PHQ9 QUESTIONS 1 & 2: 0
3. TROUBLE FALLING OR STAYING ASLEEP: 0
SUM OF ALL RESPONSES TO PHQ QUESTIONS 1-9: 0
1. LITTLE INTEREST OR PLEASURE IN DOING THINGS: 0
8. MOVING OR SPEAKING SO SLOWLY THAT OTHER PEOPLE COULD HAVE NOTICED. OR THE OPPOSITE, BEING SO FIGETY OR RESTLESS THAT YOU HAVE BEEN MOVING AROUND A LOT MORE THAN USUAL: 0
10. IF YOU CHECKED OFF ANY PROBLEMS, HOW DIFFICULT HAVE THESE PROBLEMS MADE IT FOR YOU TO DO YOUR WORK, TAKE CARE OF THINGS AT HOME, OR GET ALONG WITH OTHER PEOPLE: 0
2. FEELING DOWN, DEPRESSED OR HOPELESS: 0
SUM OF ALL RESPONSES TO PHQ QUESTIONS 1-9: 0
5. POOR APPETITE OR OVEREATING: 0
9. THOUGHTS THAT YOU WOULD BE BETTER OFF DEAD, OR OF HURTING YOURSELF: 0
7. TROUBLE CONCENTRATING ON THINGS, SUCH AS READING THE NEWSPAPER OR WATCHING TELEVISION: 0
6. FEELING BAD ABOUT YOURSELF - OR THAT YOU ARE A FAILURE OR HAVE LET YOURSELF OR YOUR FAMILY DOWN: 0
SUM OF ALL RESPONSES TO PHQ QUESTIONS 1-9: 0
SUM OF ALL RESPONSES TO PHQ QUESTIONS 1-9: 0
4. FEELING TIRED OR HAVING LITTLE ENERGY: 0

## 2024-01-24 NOTE — PROGRESS NOTES
process, benefits of medications, instructions for management of acute symptoms, and follow up plans.    Return in about 3 months (around 4/24/2024) for Medication follow up.     Giovana Zapata, APRN - CNP

## 2024-01-25 ENCOUNTER — TELEPHONE (OUTPATIENT)
Dept: FAMILY MEDICINE CLINIC | Facility: CLINIC | Age: 39
End: 2024-01-25

## 2024-01-29 ENCOUNTER — PATIENT MESSAGE (OUTPATIENT)
Dept: FAMILY MEDICINE CLINIC | Facility: CLINIC | Age: 39
End: 2024-01-29

## 2024-01-29 DIAGNOSIS — F90.9 ATTENTION DEFICIT HYPERACTIVITY DISORDER (ADHD), UNSPECIFIED ADHD TYPE: ICD-10-CM

## 2024-01-30 RX ORDER — DEXTROAMPHETAMINE SACCHARATE, AMPHETAMINE ASPARTATE MONOHYDRATE, DEXTROAMPHETAMINE SULFATE AND AMPHETAMINE SULFATE 5; 5; 5; 5 MG/1; MG/1; MG/1; MG/1
20 CAPSULE, EXTENDED RELEASE ORAL EVERY MORNING
Qty: 30 CAPSULE | Refills: 0 | Status: SHIPPED | OUTPATIENT
Start: 2024-02-23 | End: 2024-03-24

## 2024-01-30 RX ORDER — DEXTROAMPHETAMINE SACCHARATE, AMPHETAMINE ASPARTATE MONOHYDRATE, DEXTROAMPHETAMINE SULFATE AND AMPHETAMINE SULFATE 5; 5; 5; 5 MG/1; MG/1; MG/1; MG/1
20 CAPSULE, EXTENDED RELEASE ORAL EVERY MORNING
Qty: 30 CAPSULE | Refills: 0 | Status: SHIPPED | OUTPATIENT
Start: 2024-03-24 | End: 2024-04-23

## 2024-01-30 NOTE — TELEPHONE ENCOUNTER
From: Marnie Chaudhry  To: Giovana Zapata  Sent: 1/29/2024 4:04 PM EST  Subject: Extended release     Just wanted to let you know the extended release of the adderall is perfectly fine and works great.

## 2024-03-15 ENCOUNTER — OFFICE VISIT (OUTPATIENT)
Dept: FAMILY MEDICINE CLINIC | Facility: CLINIC | Age: 39
End: 2024-03-15

## 2024-03-15 VITALS
RESPIRATION RATE: 18 BRPM | SYSTOLIC BLOOD PRESSURE: 112 MMHG | DIASTOLIC BLOOD PRESSURE: 70 MMHG | HEART RATE: 92 BPM | BODY MASS INDEX: 30.8 KG/M2 | OXYGEN SATURATION: 99 % | HEIGHT: 68 IN | WEIGHT: 203.2 LBS

## 2024-03-15 DIAGNOSIS — R05.3 PERSISTENT DRY COUGH: ICD-10-CM

## 2024-03-15 DIAGNOSIS — J98.01 BRONCHOSPASM: ICD-10-CM

## 2024-03-15 DIAGNOSIS — R09.82 POST-NASAL DRIP: ICD-10-CM

## 2024-03-15 DIAGNOSIS — J40 BRONCHITIS: Primary | ICD-10-CM

## 2024-03-15 RX ORDER — ALBUTEROL SULFATE 90 UG/1
2 AEROSOL, METERED RESPIRATORY (INHALATION) EVERY 6 HOURS PRN
Qty: 1 EACH | Refills: 0 | Status: SHIPPED | OUTPATIENT
Start: 2024-03-15

## 2024-03-15 RX ORDER — DEXTROMETHORPHAN POLISTIREX 30 MG/5ML
60 SUSPENSION ORAL 2 TIMES DAILY PRN
Qty: 200 ML | Refills: 0 | Status: SHIPPED | OUTPATIENT
Start: 2024-03-15 | End: 2024-03-25

## 2024-03-15 RX ORDER — PREDNISONE 20 MG/1
TABLET ORAL
Qty: 11 TABLET | Refills: 0 | Status: SHIPPED | OUTPATIENT
Start: 2024-03-15

## 2024-03-15 RX ORDER — TRIAMCINOLONE ACETONIDE 40 MG/ML
40 INJECTION, SUSPENSION INTRA-ARTICULAR; INTRAMUSCULAR ONCE
Status: COMPLETED | OUTPATIENT
Start: 2024-03-15 | End: 2024-03-15

## 2024-03-15 RX ORDER — DOXYCYCLINE HYCLATE 100 MG/1
100 CAPSULE ORAL 2 TIMES DAILY
Qty: 14 CAPSULE | Refills: 0 | Status: SHIPPED | OUTPATIENT
Start: 2024-03-15 | End: 2024-03-22

## 2024-03-15 RX ADMIN — TRIAMCINOLONE ACETONIDE 40 MG: 40 INJECTION, SUSPENSION INTRA-ARTICULAR; INTRAMUSCULAR at 15:36

## 2024-03-15 SDOH — ECONOMIC STABILITY: FOOD INSECURITY: WITHIN THE PAST 12 MONTHS, YOU WORRIED THAT YOUR FOOD WOULD RUN OUT BEFORE YOU GOT MONEY TO BUY MORE.: NEVER TRUE

## 2024-03-15 SDOH — ECONOMIC STABILITY: INCOME INSECURITY: HOW HARD IS IT FOR YOU TO PAY FOR THE VERY BASICS LIKE FOOD, HOUSING, MEDICAL CARE, AND HEATING?: NOT HARD AT ALL

## 2024-03-15 SDOH — ECONOMIC STABILITY: FOOD INSECURITY: WITHIN THE PAST 12 MONTHS, THE FOOD YOU BOUGHT JUST DIDN'T LAST AND YOU DIDN'T HAVE MONEY TO GET MORE.: NEVER TRUE

## 2024-03-15 ASSESSMENT — ENCOUNTER SYMPTOMS
SHORTNESS OF BREATH: 1
GASTROINTESTINAL NEGATIVE: 1
COUGH: 1
ABDOMINAL PAIN: 0
COLOR CHANGE: 0
CONSTIPATION: 0
TROUBLE SWALLOWING: 0
DIARRHEA: 0
APNEA: 1
CHOKING: 0
SINUS PAIN: 0
FACIAL SWELLING: 0
STRIDOR: 0
ANAL BLEEDING: 0
BACK PAIN: 0
BLOOD IN STOOL: 0
VOICE CHANGE: 0
WHEEZING: 1
EYES NEGATIVE: 1
EYE PAIN: 0
CHEST TIGHTNESS: 0
SORE THROAT: 0
SINUS PRESSURE: 0
EYE DISCHARGE: 0
VOMITING: 0
NAUSEA: 0
RECTAL PAIN: 0
RHINORRHEA: 0
ABDOMINAL DISTENTION: 0

## 2024-03-15 NOTE — PROGRESS NOTES
Dispense: 14 capsule; Refill: 0    2. Post-nasal drip  Will have pt stop PRN OTC Mucinex and continue her Claritin as before. Pt given Kenalog 40 mg IM x 1 dose (tolerated well without issue) and Prednisone 20 mg taper as directed to help with PND.   - triamcinolone acetonide (KENALOG-40) injection 40 mg  - predniSONE (DELTASONE) 20 MG tablet; 1 tab po bid with food for four days then 1 tab po daily with food for three days  Dispense: 11 tablet; Refill: 0    3. Persistent dry cough  Will give pt PRN Delsym as directed to help with persistent dry cough.   - dextromethorphan (DELSYM) 30 MG/5ML extended release liquid; Take 10 mLs by mouth 2 times daily as needed for Cough  Dispense: 200 mL; Refill: 0    4. Bronchospasm  Pt given Kenalog 40 mg IM x 1 dose (tolerated well without issue), Prednisone 20 mg taper as directed, and PRN Albuterol MDI to help with mild bronchospasm.  - triamcinolone acetonide (KENALOG-40) injection 40 mg  - predniSONE (DELTASONE) 20 MG tablet; 1 tab po bid with food for four days then 1 tab po daily with food for three days  Dispense: 11 tablet; Refill: 0  - albuterol sulfate HFA (PROVENTIL HFA) 108 (90 Base) MCG/ACT inhaler; Inhale 2 puffs into the lungs every 6 hours as needed for Wheezing or Shortness of Breath  Dispense: 1 each; Refill: 0        Return if symptoms worsen or fail to improve, for Call in a few days if no better or sooner if worse. ER for severe symptoms. .        SUBJECTIVE/OBJECTIVE:    KRISTIAN-  Marnie Mariya Chaudhry (: 1985) is a 39 y.o. female, Established patient patient, here for evaluation of the following chief complaint(s):  Cough (See below)     Pt who has a hx of BPD, anxiety, ADHD, migraine HA, Vitamin D deficiency, GERD, RILEY-CPAP, and allergic rhinitis reports starting almost 2 weeks ago with PND and a persistent dry cough. Pt reports taking PRN OTC Mucinex and taking her Claritin as directed but symptoms persisting. Pt reports having some mild SOB/wheezing at

## 2024-04-22 ENCOUNTER — TELEMEDICINE (OUTPATIENT)
Dept: FAMILY MEDICINE CLINIC | Facility: CLINIC | Age: 39
End: 2024-04-22
Payer: COMMERCIAL

## 2024-04-22 DIAGNOSIS — J40 BRONCHITIS: Primary | ICD-10-CM

## 2024-04-22 DIAGNOSIS — J34.89 SINUS PRESSURE: ICD-10-CM

## 2024-04-22 DIAGNOSIS — Z00.00 LABORATORY EXAMINATION ORDERED AS PART OF A COMPLETE PHYSICAL EXAMINATION: ICD-10-CM

## 2024-04-22 DIAGNOSIS — Z13.1 SCREENING FOR DIABETES MELLITUS: ICD-10-CM

## 2024-04-22 DIAGNOSIS — J30.89 OTHER ALLERGIC RHINITIS: ICD-10-CM

## 2024-04-22 DIAGNOSIS — F90.9 ATTENTION DEFICIT HYPERACTIVITY DISORDER (ADHD), UNSPECIFIED ADHD TYPE: ICD-10-CM

## 2024-04-22 PROCEDURE — 99214 OFFICE O/P EST MOD 30 MIN: CPT | Performed by: NURSE PRACTITIONER

## 2024-04-22 RX ORDER — CODEINE PHOSPHATE/GUAIFENESIN 10-100MG/5
5 LIQUID (ML) ORAL 4 TIMES DAILY PRN
Qty: 180 ML | Refills: 0 | Status: SHIPPED | OUTPATIENT
Start: 2024-04-22 | End: 2024-05-02

## 2024-04-22 RX ORDER — SUVOREXANT 20 MG/1
TABLET, FILM COATED ORAL
COMMUNITY
Start: 2024-04-17

## 2024-04-22 RX ORDER — AZELASTINE 1 MG/ML
1 SPRAY, METERED NASAL 2 TIMES DAILY
Qty: 60 ML | Refills: 1 | Status: SHIPPED | OUTPATIENT
Start: 2024-04-22

## 2024-04-22 RX ORDER — BENZONATATE 200 MG/1
200 CAPSULE ORAL 3 TIMES DAILY PRN
Qty: 30 CAPSULE | Refills: 0 | Status: SHIPPED | OUTPATIENT
Start: 2024-04-22

## 2024-04-22 RX ORDER — PREDNISONE 10 MG/1
TABLET ORAL
Qty: 11 TABLET | Refills: 0 | Status: SHIPPED | OUTPATIENT
Start: 2024-04-22

## 2024-04-22 RX ORDER — LEVOFLOXACIN 500 MG/1
500 TABLET, FILM COATED ORAL DAILY
Qty: 10 TABLET | Refills: 0 | Status: SHIPPED | OUTPATIENT
Start: 2024-04-22 | End: 2024-05-02

## 2024-04-22 RX ORDER — CARIPRAZINE 3 MG/1
3 CAPSULE, GELATIN COATED ORAL DAILY
COMMUNITY
Start: 2024-04-11

## 2024-04-22 RX ORDER — DEXTROAMPHETAMINE SACCHARATE, AMPHETAMINE ASPARTATE MONOHYDRATE, DEXTROAMPHETAMINE SULFATE AND AMPHETAMINE SULFATE 7.5; 7.5; 7.5; 7.5 MG/1; MG/1; MG/1; MG/1
30 CAPSULE, EXTENDED RELEASE ORAL DAILY
Qty: 30 CAPSULE | Refills: 0 | Status: SHIPPED | OUTPATIENT
Start: 2024-04-22 | End: 2024-05-22

## 2024-04-22 RX ORDER — LAMOTRIGINE 200 MG/1
200 TABLET ORAL DAILY
COMMUNITY
Start: 2024-04-11

## 2024-04-22 RX ORDER — BUSPIRONE HYDROCHLORIDE 10 MG/1
TABLET ORAL
COMMUNITY
Start: 2024-04-17

## 2024-04-22 ASSESSMENT — ENCOUNTER SYMPTOMS
SINUS PAIN: 1
SHORTNESS OF BREATH: 0
FACIAL SWELLING: 0
EYE REDNESS: 0
VOMITING: 0
APNEA: 0
SINUS PRESSURE: 1
PHOTOPHOBIA: 0
NAUSEA: 0
CHOKING: 0
WHEEZING: 1
EYES NEGATIVE: 1
EYE ITCHING: 0
EYE PAIN: 0
BLOOD IN STOOL: 0
CONSTIPATION: 0
ALLERGIC/IMMUNOLOGIC NEGATIVE: 1
ANAL BLEEDING: 0
ABDOMINAL DISTENTION: 0
RHINORRHEA: 0
SORE THROAT: 0
DIARRHEA: 0
TROUBLE SWALLOWING: 0
VOICE CHANGE: 0
ABDOMINAL PAIN: 0
BACK PAIN: 0
RECTAL PAIN: 0
CHEST TIGHTNESS: 1
GASTROINTESTINAL NEGATIVE: 1
COLOR CHANGE: 0
COUGH: 1
EYE DISCHARGE: 0
STRIDOR: 0

## 2024-04-22 NOTE — PROGRESS NOTES
take medication and drive or operate machinery due to increased risk of injury.  Patient was also advised to exercise caution when getting up after taking medication to avoid falls.    Advised patient to let us know if symptoms do not improve or resolve or go to the ED with any chest pain or shortness of breath.    3. Other allergic rhinitis  -     azelastine (ASTELIN) 0.1 % nasal spray; 1 spray by Nasal route 2 times daily Use in each nostril as directed, Disp-60 mL, R-1Normal    As above.    4. Attention deficit hyperactivity disorder (ADHD), unspecified ADHD type  -     amphetamine-dextroamphetamine (ADDERALL XR) 30 MG extended release capsule; Take 1 capsule by mouth daily for 30 days. Max Daily Amount: 30 mg, Disp-30 capsule, R-0Normal    Has been doing quite well with Adderall 20 mg but reports that she feels medication is not as effective as it was in the past.  She would like to try higher dosing if possible.  Has been having good vitals and stable BP with all previous dosing.  Reiterated stimulant side effects.  We will plan to recheck in 4 weeks.      Orders Placed This Encounter   Procedures    TSH with Reflex     Standing Status:   Future     Standing Expiration Date:   4/22/2025    Vitamin D 25 Hydroxy     Standing Status:   Future     Standing Expiration Date:   4/22/2025    Lipid Panel     Standing Status:   Future     Standing Expiration Date:   4/22/2025    Comprehensive Metabolic Panel     Standing Status:   Future     Standing Expiration Date:   4/22/2025    CBC with Auto Differential     Standing Status:   Future     Standing Expiration Date:   4/22/2025    Hemoglobin A1C     Standing Status:   Future     Standing Expiration Date:   4/22/2025    AMB POC URINALYSIS DIP STICK AUTO W/O MICRO     Standing Status:   Future     Standing Expiration Date:   4/22/2025         Elements of this note have been dictated using speech recognition software. As a result, errors of speech recognition may have

## 2024-05-14 ENCOUNTER — NURSE ONLY (OUTPATIENT)
Dept: FAMILY MEDICINE CLINIC | Facility: CLINIC | Age: 39
End: 2024-05-14
Payer: COMMERCIAL

## 2024-05-14 ENCOUNTER — NURSE ONLY (OUTPATIENT)
Dept: FAMILY MEDICINE CLINIC | Facility: CLINIC | Age: 39
End: 2024-05-14

## 2024-05-14 VITALS
WEIGHT: 209.2 LBS | HEART RATE: 98 BPM | HEIGHT: 68 IN | DIASTOLIC BLOOD PRESSURE: 72 MMHG | OXYGEN SATURATION: 96 % | SYSTOLIC BLOOD PRESSURE: 118 MMHG | BODY MASS INDEX: 31.71 KG/M2

## 2024-05-14 DIAGNOSIS — J30.89 OTHER ALLERGIC RHINITIS: ICD-10-CM

## 2024-05-14 DIAGNOSIS — J34.89 SINUS PRESSURE: ICD-10-CM

## 2024-05-14 DIAGNOSIS — Z00.00 LABORATORY EXAMINATION ORDERED AS PART OF A COMPLETE PHYSICAL EXAMINATION: ICD-10-CM

## 2024-05-14 DIAGNOSIS — Z13.1 SCREENING FOR DIABETES MELLITUS: ICD-10-CM

## 2024-05-14 LAB
25(OH)D3 SERPL-MCNC: 48.1 NG/ML (ref 30–100)
ALBUMIN SERPL-MCNC: 4.1 G/DL (ref 3.5–5)
ALBUMIN/GLOB SERPL: 1.6 (ref 1–1.9)
ALP SERPL-CCNC: 77 U/L (ref 35–104)
ALT SERPL-CCNC: 9 U/L (ref 12–65)
ANION GAP SERPL CALC-SCNC: 11 MMOL/L (ref 9–18)
AST SERPL-CCNC: 17 U/L (ref 15–37)
BASOPHILS # BLD: 0.1 K/UL (ref 0–0.2)
BASOPHILS NFR BLD: 1 % (ref 0–2)
BILIRUB SERPL-MCNC: 0.7 MG/DL (ref 0–1.2)
BILIRUBIN, URINE, POC: NEGATIVE
BLOOD URINE, POC: NEGATIVE
BUN SERPL-MCNC: 10 MG/DL (ref 6–23)
CALCIUM SERPL-MCNC: 9.7 MG/DL (ref 8.8–10.2)
CHLORIDE SERPL-SCNC: 103 MMOL/L (ref 98–107)
CHOLEST SERPL-MCNC: 154 MG/DL (ref 0–200)
CO2 SERPL-SCNC: 25 MMOL/L (ref 20–28)
CREAT SERPL-MCNC: 0.95 MG/DL (ref 0.6–1.1)
DIFFERENTIAL METHOD BLD: ABNORMAL
EOSINOPHIL # BLD: 0.5 K/UL (ref 0–0.8)
EOSINOPHIL NFR BLD: 6 % (ref 0.5–7.8)
ERYTHROCYTE [DISTWIDTH] IN BLOOD BY AUTOMATED COUNT: 12.9 % (ref 11.9–14.6)
EST. AVERAGE GLUCOSE BLD GHB EST-MCNC: 107 MG/DL
GLOBULIN SER CALC-MCNC: 2.6 G/DL (ref 2.3–3.5)
GLUCOSE SERPL-MCNC: 96 MG/DL (ref 70–99)
GLUCOSE URINE, POC: NEGATIVE
HBA1C MFR BLD: 5.3 % (ref 0–5.6)
HCT VFR BLD AUTO: 42.1 % (ref 35.8–46.3)
HDLC SERPL-MCNC: 53 MG/DL (ref 40–60)
HDLC SERPL: 2.9 (ref 0–5)
HGB BLD-MCNC: 13.5 G/DL (ref 11.7–15.4)
IMM GRANULOCYTES # BLD AUTO: 0 K/UL (ref 0–0.5)
IMM GRANULOCYTES NFR BLD AUTO: 0 % (ref 0–5)
KETONES, URINE, POC: NEGATIVE
LDLC SERPL CALC-MCNC: 81 MG/DL (ref 0–100)
LEUKOCYTE ESTERASE, URINE, POC: NEGATIVE
LYMPHOCYTES # BLD: 2.4 K/UL (ref 0.5–4.6)
LYMPHOCYTES NFR BLD: 29 % (ref 13–44)
MCH RBC QN AUTO: 28.8 PG (ref 26.1–32.9)
MCHC RBC AUTO-ENTMCNC: 32.1 G/DL (ref 31.4–35)
MCV RBC AUTO: 90 FL (ref 82–102)
MONOCYTES # BLD: 0.5 K/UL (ref 0.1–1.3)
MONOCYTES NFR BLD: 6 % (ref 4–12)
NEUTS SEG # BLD: 4.7 K/UL (ref 1.7–8.2)
NEUTS SEG NFR BLD: 58 % (ref 43–78)
NITRITE, URINE, POC: NEGATIVE
NRBC # BLD: 0 K/UL (ref 0–0.2)
PH, URINE, POC: 7.5 (ref 4.6–8)
PLATELET # BLD AUTO: 141 K/UL (ref 150–450)
PMV BLD AUTO: 9.4 FL (ref 9.4–12.3)
POTASSIUM SERPL-SCNC: 4.2 MMOL/L (ref 3.5–5.1)
PROT SERPL-MCNC: 6.7 G/DL (ref 6.3–8.2)
PROTEIN,URINE, POC: NEGATIVE
RBC # BLD AUTO: 4.68 M/UL (ref 4.05–5.2)
SODIUM SERPL-SCNC: 139 MMOL/L (ref 136–145)
SPECIFIC GRAVITY, URINE, POC: 1.01 (ref 1–1.03)
TRIGL SERPL-MCNC: 101 MG/DL (ref 0–150)
TSH W FREE THYROID IF ABNORMAL: 1.48 UIU/ML (ref 0.27–4.2)
URINALYSIS CLARITY, POC: CLEAR
URINALYSIS COLOR, POC: YELLOW
UROBILINOGEN, POC: NORMAL
VLDLC SERPL CALC-MCNC: 20 MG/DL (ref 6–23)
WBC # BLD AUTO: 8.1 K/UL (ref 4.3–11.1)

## 2024-05-14 PROCEDURE — 81003 URINALYSIS AUTO W/O SCOPE: CPT | Performed by: NURSE PRACTITIONER

## 2024-05-14 RX ORDER — AZELASTINE HYDROCHLORIDE 137 UG/1
1 SPRAY, METERED NASAL 2 TIMES DAILY
Qty: 90 EACH | Refills: 2 | Status: SHIPPED | OUTPATIENT
Start: 2024-05-14

## 2024-05-24 ENCOUNTER — TELEMEDICINE (OUTPATIENT)
Dept: FAMILY MEDICINE CLINIC | Facility: CLINIC | Age: 39
End: 2024-05-24
Payer: COMMERCIAL

## 2024-05-24 DIAGNOSIS — F90.9 ATTENTION DEFICIT HYPERACTIVITY DISORDER (ADHD), UNSPECIFIED ADHD TYPE: Primary | ICD-10-CM

## 2024-05-24 DIAGNOSIS — E53.8 B12 DEFICIENCY: ICD-10-CM

## 2024-05-24 DIAGNOSIS — G43.009 MIGRAINE WITHOUT AURA AND WITHOUT STATUS MIGRAINOSUS, NOT INTRACTABLE: ICD-10-CM

## 2024-05-24 DIAGNOSIS — F41.1 GENERALIZED ANXIETY DISORDER: ICD-10-CM

## 2024-05-24 DIAGNOSIS — J30.89 OTHER ALLERGIC RHINITIS: ICD-10-CM

## 2024-05-24 PROCEDURE — 99214 OFFICE O/P EST MOD 30 MIN: CPT | Performed by: NURSE PRACTITIONER

## 2024-05-24 RX ORDER — DEXTROAMPHETAMINE SACCHARATE, AMPHETAMINE ASPARTATE, DEXTROAMPHETAMINE SULFATE AND AMPHETAMINE SULFATE 5; 5; 5; 5 MG/1; MG/1; MG/1; MG/1
20 TABLET ORAL 2 TIMES DAILY
Qty: 60 TABLET | Refills: 0 | Status: SHIPPED | OUTPATIENT
Start: 2024-05-24 | End: 2024-06-23

## 2024-05-24 RX ORDER — METOPROLOL SUCCINATE 25 MG/1
25 TABLET, EXTENDED RELEASE ORAL DAILY
Qty: 30 TABLET | Refills: 1 | Status: SHIPPED | OUTPATIENT
Start: 2024-05-24

## 2024-05-24 NOTE — PROGRESS NOTES
results were reviewed with the patient today.   Lab Results   Component Value Date     05/14/2024    K 4.2 05/14/2024     05/14/2024    CO2 25 05/14/2024    BUN 10 05/14/2024    CREATININE 0.95 05/14/2024    GLUCOSE 96 05/14/2024    CALCIUM 9.7 05/14/2024    BILITOT 0.7 05/14/2024    ALKPHOS 77 05/14/2024    AST 17 05/14/2024    ALT 9 (L) 05/14/2024    LABGLOM 79 05/14/2024    GFRAA 98 10/27/2021    AGRATIO 1.5 05/19/2022    GLOB 2.6 05/14/2024       Lab Results   Component Value Date    WBC 8.1 05/14/2024    HGB 13.5 05/14/2024    HCT 42.1 05/14/2024    MCV 90.0 05/14/2024     (L) 05/14/2024     Lab Results   Component Value Date    CHOL 154 05/14/2024    CHOL 160 05/15/2023    CHOL 197 05/19/2022     Lab Results   Component Value Date    TRIG 101 05/14/2024    TRIG 114 05/15/2023    TRIG 242 (H) 05/19/2022     Lab Results   Component Value Date    HDL 53 05/14/2024    HDL 56 05/15/2023    HDL 66 05/19/2022     Lab Results   Component Value Date    LDL 81 05/14/2024    LDL 81.2 05/15/2023    LDL 91 05/19/2022     Lab Results   Component Value Date    VLDL 20 05/14/2024    VLDL 22.8 05/15/2023    VLDL 40 05/19/2022     Lab Results   Component Value Date    CHOLHDLRATIO 2.9 05/14/2024    CHOLHDLRATIO 2.9 05/15/2023     Hemoglobin A1C   Date Value Ref Range Status   05/14/2024 5.3 0 - 5.6 % Final     Comment:     Reference Range  Normal       <5.7%  Prediabetes  5.7-6.4%  Diabetes     >6.4%       Lab Results   Component Value Date/Time    VITD25 48.1 05/14/2024 08:14 AM      Lab Results   Component Value Date    TSH 2.160 05/19/2022    TSHELE 1.48 05/14/2024         ASSESSMENT and PLAN    1. Attention deficit hyperactivity disorder (ADHD), unspecified ADHD type  -     amphetamine-dextroamphetamine (ADDERALL, 20MG,) 20 MG tablet; Take 1 tablet by mouth 2 times daily for 30 days. Max Daily Amount: 40 mg, Disp-60 tablet, R-0Normal    Patient reports doing quite well with Adderall 30 mg extended release

## 2024-06-01 ASSESSMENT — ENCOUNTER SYMPTOMS
TROUBLE SWALLOWING: 0
ABDOMINAL DISTENTION: 0
RECTAL PAIN: 0
RHINORRHEA: 0
SHORTNESS OF BREATH: 0
VOICE CHANGE: 0
SORE THROAT: 0
CHOKING: 0
EYE REDNESS: 0
APNEA: 0
EYE ITCHING: 0
EYE DISCHARGE: 0
GASTROINTESTINAL NEGATIVE: 1
EYE PAIN: 0
FACIAL SWELLING: 0
BACK PAIN: 0
WHEEZING: 0
ABDOMINAL PAIN: 0
BLOOD IN STOOL: 0
SINUS PRESSURE: 0
STRIDOR: 0
RESPIRATORY NEGATIVE: 1
EYES NEGATIVE: 1
DIARRHEA: 0
VOMITING: 0
PHOTOPHOBIA: 0
ANAL BLEEDING: 0
ALLERGIC/IMMUNOLOGIC NEGATIVE: 1
CONSTIPATION: 0
COLOR CHANGE: 0
SINUS PAIN: 0
COUGH: 0
CHEST TIGHTNESS: 0
NAUSEA: 0

## 2024-06-17 RX ORDER — METOPROLOL SUCCINATE 25 MG/1
25 TABLET, EXTENDED RELEASE ORAL DAILY
Qty: 90 TABLET | Refills: 3 | Status: SHIPPED | OUTPATIENT
Start: 2024-06-17

## 2024-07-03 ENCOUNTER — TELEMEDICINE (OUTPATIENT)
Dept: FAMILY MEDICINE CLINIC | Facility: CLINIC | Age: 39
End: 2024-07-03
Payer: COMMERCIAL

## 2024-07-03 DIAGNOSIS — R00.0 TACHYCARDIA: Primary | ICD-10-CM

## 2024-07-03 DIAGNOSIS — F90.9 ATTENTION DEFICIT HYPERACTIVITY DISORDER (ADHD), UNSPECIFIED ADHD TYPE: ICD-10-CM

## 2024-07-03 PROCEDURE — 99213 OFFICE O/P EST LOW 20 MIN: CPT | Performed by: NURSE PRACTITIONER

## 2024-07-03 RX ORDER — CARIPRAZINE 4.5 MG/1
4.5 CAPSULE, GELATIN COATED ORAL DAILY
COMMUNITY
Start: 2024-06-17

## 2024-07-03 ASSESSMENT — ENCOUNTER SYMPTOMS
WHEEZING: 0
SINUS PAIN: 0
GASTROINTESTINAL NEGATIVE: 1
SHORTNESS OF BREATH: 0
SINUS PRESSURE: 0
PHOTOPHOBIA: 0
BACK PAIN: 0
STRIDOR: 0
CHOKING: 0
VOMITING: 0
COUGH: 0
ALLERGIC/IMMUNOLOGIC NEGATIVE: 1
ANAL BLEEDING: 0
FACIAL SWELLING: 0
APNEA: 0
EYE ITCHING: 0
RECTAL PAIN: 0
NAUSEA: 0
SORE THROAT: 0
CHEST TIGHTNESS: 0
ABDOMINAL DISTENTION: 0
ABDOMINAL PAIN: 0
CONSTIPATION: 0
VOICE CHANGE: 0
DIARRHEA: 0
BLOOD IN STOOL: 0
RESPIRATORY NEGATIVE: 1
COLOR CHANGE: 0
EYES NEGATIVE: 1
TROUBLE SWALLOWING: 0
EYE PAIN: 0
EYE DISCHARGE: 0
EYE REDNESS: 0
RHINORRHEA: 0

## 2024-07-03 NOTE — PROGRESS NOTES
stool, constipation, diarrhea, nausea, rectal pain and vomiting.   Endocrine: Negative.  Negative for cold intolerance, heat intolerance, polydipsia, polyphagia and polyuria.   Genitourinary: Negative.  Negative for decreased urine volume, difficulty urinating, dysuria, enuresis, flank pain, frequency, genital sores, hematuria and urgency.   Musculoskeletal: Negative.  Negative for arthralgias, back pain, gait problem, joint swelling, myalgias, neck pain and neck stiffness.   Skin: Negative.  Negative for color change, pallor, rash and wound.   Allergic/Immunologic: Negative.  Negative for environmental allergies, food allergies and immunocompromised state.   Neurological: Negative.  Negative for dizziness, tremors, seizures, syncope, facial asymmetry, speech difficulty, weakness, light-headedness, numbness and headaches.   Hematological: Negative.  Negative for adenopathy. Does not bruise/bleed easily.   Psychiatric/Behavioral:  Positive for decreased concentration, dysphoric mood (Stable per psychiatry) and sleep disturbance (Works different shifts). Negative for agitation, behavioral problems, confusion, hallucinations, self-injury and suicidal ideas. The patient is nervous/anxious (Stable per psychiatry). The patient is not hyperactive.          OBJECTIVE:    Patient's vital signs were not performed as encounter was performed virtually.  Location of virtual visit was patient's home.     Physical Exam  Constitutional:       General: She is not in acute distress.     Appearance: Normal appearance. She is not toxic-appearing or diaphoretic.   HENT:      Head: Normocephalic and atraumatic.   Neurological:      General: No focal deficit present.      Mental Status: She is alert and oriented to person, place, and time.   Psychiatric:         Mood and Affect: Mood normal.         Behavior: Behavior normal.         Thought Content: Thought content normal.         Judgment: Judgment normal.          Medical problems and

## 2024-09-17 ENCOUNTER — OFFICE VISIT (OUTPATIENT)
Dept: FAMILY MEDICINE CLINIC | Facility: CLINIC | Age: 39
End: 2024-09-17
Payer: COMMERCIAL

## 2024-09-17 VITALS
DIASTOLIC BLOOD PRESSURE: 80 MMHG | WEIGHT: 230 LBS | OXYGEN SATURATION: 98 % | HEART RATE: 106 BPM | HEIGHT: 68 IN | BODY MASS INDEX: 34.86 KG/M2 | SYSTOLIC BLOOD PRESSURE: 104 MMHG

## 2024-09-17 DIAGNOSIS — M54.50 ACUTE RIGHT-SIDED LOW BACK PAIN WITHOUT SCIATICA: Primary | ICD-10-CM

## 2024-09-17 PROCEDURE — 99214 OFFICE O/P EST MOD 30 MIN: CPT | Performed by: NURSE PRACTITIONER

## 2024-09-17 PROCEDURE — 96372 THER/PROPH/DIAG INJ SC/IM: CPT | Performed by: NURSE PRACTITIONER

## 2024-09-17 RX ORDER — PANTOPRAZOLE SODIUM 40 MG/1
40 TABLET, DELAYED RELEASE ORAL
Qty: 90 TABLET | Refills: 1 | Status: SHIPPED | OUTPATIENT
Start: 2024-09-17

## 2024-09-17 RX ORDER — TRIAMCINOLONE ACETONIDE 40 MG/ML
40 INJECTION, SUSPENSION INTRA-ARTICULAR; INTRAMUSCULAR ONCE
Status: COMPLETED | OUTPATIENT
Start: 2024-09-17 | End: 2024-09-17

## 2024-09-17 RX ORDER — QUETIAPINE FUMARATE 100 MG/1
100 TABLET, FILM COATED ORAL
COMMUNITY
Start: 2024-09-04

## 2024-09-17 RX ORDER — PREDNISONE 20 MG/1
TABLET ORAL
Qty: 11 TABLET | Refills: 0 | Status: SHIPPED | OUTPATIENT
Start: 2024-09-17

## 2024-09-17 RX ORDER — CLONAZEPAM 0.5 MG/1
0.5 TABLET ORAL DAILY
COMMUNITY
Start: 2024-08-07

## 2024-09-17 RX ORDER — MELOXICAM 15 MG/1
TABLET ORAL
Qty: 90 TABLET | Refills: 1 | Status: SHIPPED | OUTPATIENT
Start: 2024-09-17

## 2024-09-17 RX ORDER — KETOROLAC TROMETHAMINE 30 MG/ML
60 INJECTION, SOLUTION INTRAMUSCULAR; INTRAVENOUS ONCE
Status: COMPLETED | OUTPATIENT
Start: 2024-09-17 | End: 2024-09-17

## 2024-09-17 RX ADMIN — KETOROLAC TROMETHAMINE 60 MG: 30 INJECTION, SOLUTION INTRAMUSCULAR; INTRAVENOUS at 09:47

## 2024-09-17 RX ADMIN — TRIAMCINOLONE ACETONIDE 40 MG: 40 INJECTION, SUSPENSION INTRA-ARTICULAR; INTRAMUSCULAR at 09:48

## 2024-11-04 ENCOUNTER — OFFICE VISIT (OUTPATIENT)
Dept: FAMILY MEDICINE CLINIC | Facility: CLINIC | Age: 39
End: 2024-11-04
Payer: COMMERCIAL

## 2024-11-04 ENCOUNTER — PATIENT MESSAGE (OUTPATIENT)
Dept: FAMILY MEDICINE CLINIC | Facility: CLINIC | Age: 39
End: 2024-11-04

## 2024-11-04 VITALS
HEART RATE: 90 BPM | SYSTOLIC BLOOD PRESSURE: 110 MMHG | DIASTOLIC BLOOD PRESSURE: 74 MMHG | HEIGHT: 68 IN | BODY MASS INDEX: 36.34 KG/M2 | OXYGEN SATURATION: 98 % | RESPIRATION RATE: 16 BRPM | WEIGHT: 239.8 LBS | TEMPERATURE: 98.2 F

## 2024-11-04 DIAGNOSIS — E66.812 CLASS 2 OBESITY WITH BODY MASS INDEX (BMI) OF 36.0 TO 36.9 IN ADULT, UNSPECIFIED OBESITY TYPE, UNSPECIFIED WHETHER SERIOUS COMORBIDITY PRESENT: Primary | ICD-10-CM

## 2024-11-04 DIAGNOSIS — M54.50 CHRONIC BILATERAL LOW BACK PAIN WITHOUT SCIATICA: Primary | ICD-10-CM

## 2024-11-04 DIAGNOSIS — G89.29 CHRONIC BILATERAL LOW BACK PAIN WITHOUT SCIATICA: Primary | ICD-10-CM

## 2024-11-04 DIAGNOSIS — E66.812 CLASS 2 OBESITY WITH BODY MASS INDEX (BMI) OF 36.0 TO 36.9 IN ADULT, UNSPECIFIED OBESITY TYPE, UNSPECIFIED WHETHER SERIOUS COMORBIDITY PRESENT: ICD-10-CM

## 2024-11-04 PROCEDURE — 99214 OFFICE O/P EST MOD 30 MIN: CPT | Performed by: NURSE PRACTITIONER

## 2024-11-04 RX ORDER — TOPIRAMATE 25 MG/1
25 TABLET, FILM COATED ORAL NIGHTLY
Qty: 30 TABLET | Refills: 1 | Status: SHIPPED | OUTPATIENT
Start: 2024-11-04

## 2024-11-04 RX ORDER — PHENTERMINE HYDROCHLORIDE 37.5 MG/1
37.5 TABLET ORAL
Qty: 30 TABLET | Refills: 0 | Status: SHIPPED | OUTPATIENT
Start: 2024-11-04 | End: 2024-12-04

## 2024-11-04 RX ORDER — PREGABALIN 25 MG/1
25 CAPSULE ORAL 3 TIMES DAILY
Qty: 90 CAPSULE | Refills: 2 | Status: SHIPPED | OUTPATIENT
Start: 2024-11-04 | End: 2025-02-02

## 2024-11-04 ASSESSMENT — ENCOUNTER SYMPTOMS
COLOR CHANGE: 0
PHOTOPHOBIA: 0
STRIDOR: 0
RHINORRHEA: 0
EYE ITCHING: 0
RECTAL PAIN: 0
SINUS PAIN: 0
ANAL BLEEDING: 0
EYE REDNESS: 0
CHEST TIGHTNESS: 0
SORE THROAT: 0
WHEEZING: 0
CONSTIPATION: 0
ALLERGIC/IMMUNOLOGIC NEGATIVE: 1
SHORTNESS OF BREATH: 0
EYE PAIN: 0
APNEA: 0
EYES NEGATIVE: 1
DIARRHEA: 0
COUGH: 0
NAUSEA: 0
TROUBLE SWALLOWING: 0
ABDOMINAL DISTENTION: 0
FACIAL SWELLING: 0
GASTROINTESTINAL NEGATIVE: 1
CHOKING: 0
BLOOD IN STOOL: 0
ABDOMINAL PAIN: 0
RESPIRATORY NEGATIVE: 1
BACK PAIN: 1
EYE DISCHARGE: 0
VOICE CHANGE: 0
SINUS PRESSURE: 0
VOMITING: 0

## 2024-11-04 ASSESSMENT — PATIENT HEALTH QUESTIONNAIRE - PHQ9
SUM OF ALL RESPONSES TO PHQ QUESTIONS 1-9: 0
2. FEELING DOWN, DEPRESSED OR HOPELESS: NOT AT ALL
SUM OF ALL RESPONSES TO PHQ QUESTIONS 1-9: 0
1. LITTLE INTEREST OR PLEASURE IN DOING THINGS: NOT AT ALL
SUM OF ALL RESPONSES TO PHQ9 QUESTIONS 1 & 2: 0

## 2024-11-04 NOTE — PROGRESS NOTES
tablet by mouth nightly as needed (muscle spasm) May cause drowsiness, Disp-90 tablet, R-5Normal    Patient with a history of chronic low back pain.  Has been working with physical therapy many years prior and has been doing home therapy exercises with minimal relief.  She has also exhausted heat and ice therapy, OTC NSAIDs and is now currently taking meloxicam daily without any relief.  She has also taken tizanidine which provide some relief but unfortunately reports that medication causes sedation.  She works as a  and cannot take medication during work.    Patient has no imaging with detail completed aside for a lumbar x-ray.  MRI ordered for further evaluation.  Will also place a referral to Ortho for consideration.  We will renew tizanidine for use at bedtime as needed.  We will also give her a low-dose Lyrica as needed for nerve pain.Review risk and side effects of medication including drowsiness, dizziness, increased risk of constipation, increased risk of injury.  Patient was advised not to take medication and drive or operate machinery due to increased risk of injury.  Patient was also advised to exercise caution when getting up after taking medication to avoid falls.  Advised patient to avoid taking Lyrica concurrently with tizanidine and Seroquel due to Marr sedation side effects.    2. Class 2 obesity with body mass index (BMI) of 36.0 to 36.9 in adult, unspecified obesity type, unspecified whether serious comorbidity present  -     naltrexone-buPROPion (CONTRAVE) 8-90 MG per extended release tablet; Take 1 tablet with food daily for 1 week, then increase 1 tab twice daily for 1 week then 2 tabs daily in the morning and 1 at bedtime for 1 week then increase to 2 tabs twice daily afterwards, Disp-120 tablet, R-3Normal    Patient has tried multiple medication previously including phentermine and Tenuate.  She did have a slight elevated heart rate with use.  We will wait on restarting those

## 2024-11-13 ENCOUNTER — OFFICE VISIT (OUTPATIENT)
Age: 39
End: 2024-11-13
Payer: COMMERCIAL

## 2024-11-13 VITALS — WEIGHT: 229.8 LBS | BODY MASS INDEX: 34.83 KG/M2 | HEIGHT: 68 IN

## 2024-11-13 DIAGNOSIS — M51.362 DEGENERATION OF INTERVERTEBRAL DISC OF LUMBAR REGION WITH DISCOGENIC BACK PAIN AND LOWER EXTREMITY PAIN: ICD-10-CM

## 2024-11-13 DIAGNOSIS — M47.816 LUMBAR FACET ARTHROPATHY: ICD-10-CM

## 2024-11-13 DIAGNOSIS — M54.16 LUMBAR RADICULOPATHY: Primary | ICD-10-CM

## 2024-11-13 PROCEDURE — 99204 OFFICE O/P NEW MOD 45 MIN: CPT | Performed by: NURSE PRACTITIONER

## 2024-11-13 RX ORDER — CELECOXIB 200 MG/1
200 CAPSULE ORAL 2 TIMES DAILY PRN
Qty: 60 CAPSULE | Refills: 0 | Status: SHIPPED | OUTPATIENT
Start: 2024-11-13

## 2024-11-13 NOTE — PROGRESS NOTES
tract or other bleeding, and also some cardiac risk. Instructions were given to discontinue the NSAID if there is any sign of GI bleed, chest pain, or shortness of breath.  Continued use of NSAIDS is recommended to be managed by PCP to monitor kidney and liver function.    4 This is a undiagnosed new problem with uncertain prognosis    Orders Placed This Encounter   Medications    celecoxib (CELEBREX) 200 MG capsule     Sig: Take 1 capsule by mouth 2 times daily as needed for Pain     Dispense:  60 capsule     Refill:  0        No orders of the defined types were placed in this encounter.           Return for MRI results.     TIARA Paiz - CNP  11/13/24      Elements of this note were created using speech recognition software.  As such, errors of speech recognition may be present.

## 2024-11-26 DIAGNOSIS — E66.812 CLASS 2 OBESITY WITH BODY MASS INDEX (BMI) OF 36.0 TO 36.9 IN ADULT, UNSPECIFIED OBESITY TYPE, UNSPECIFIED WHETHER SERIOUS COMORBIDITY PRESENT: ICD-10-CM

## 2024-11-26 RX ORDER — TOPIRAMATE 25 MG/1
25 TABLET, FILM COATED ORAL NIGHTLY
Qty: 90 TABLET | Refills: 1 | Status: SHIPPED | OUTPATIENT
Start: 2024-11-26

## 2024-12-02 ENCOUNTER — OFFICE VISIT (OUTPATIENT)
Age: 39
End: 2024-12-02
Payer: COMMERCIAL

## 2024-12-02 DIAGNOSIS — M54.16 LUMBAR RADICULOPATHY: Primary | ICD-10-CM

## 2024-12-02 DIAGNOSIS — M51.362 DEGENERATION OF INTERVERTEBRAL DISC OF LUMBAR REGION WITH DISCOGENIC BACK PAIN AND LOWER EXTREMITY PAIN: ICD-10-CM

## 2024-12-02 DIAGNOSIS — M48.061 STENOSIS OF LATERAL RECESS OF LUMBAR SPINE: ICD-10-CM

## 2024-12-02 PROCEDURE — 99214 OFFICE O/P EST MOD 30 MIN: CPT | Performed by: NURSE PRACTITIONER

## 2024-12-02 RX ORDER — CELECOXIB 200 MG/1
200 CAPSULE ORAL 2 TIMES DAILY PRN
Qty: 60 CAPSULE | Refills: 1 | Status: SHIPPED | OUTPATIENT
Start: 2024-12-02

## 2024-12-02 NOTE — PROGRESS NOTES
narrowing both subarticular  recesses, and likely contacting the traversing left greater than right L5 nerve  roots. The neuroforamina are patent.    L5-S1: Mild bilateral facet arthropathy. No significant canal or foraminal  stenosis.    Impression  1.  Lumbar spine degenerative changes are worst at L4-L5, where there is a  broad-based central to left subarticular protrusion. The protrusion moderately  narrows the spinal canal, narrows the left greater than right subarticular  recesses, and likely contacts the traversing left greater than right L5 nerve  roots..    2.  Lumbar spine degenerative features are otherwise as fully detailed above.      Electronically signed by XI DELVALLE    Patient I reviewed her MRI findings.  We discussed that there is a central disc protrusion causing lateral recess stenosis and moderate central stenosis.      Assessment/Plan:        ICD-10-CM    1. Lumbar radiculopathy  M54.16       2. Degeneration of intervertebral disc of lumbar region with discogenic back pain and lower extremity pain  M51.362       3. Stenosis of lateral recess of lumbar spine  M48.061            Patient and I reviewed her MRI images.  We discussed the pathophysiology.  We discussed that there is some stenosis at the L4-5 level due to the disc protrusion.  This is causing her chronic pain.  We discussed interventional management versus lastly surgical correction.  She is not quite interested in injections or surgery at current time.  She did find the Celebrex helpful.  I told her that I would refill this but in the future I would get this from her primary doctor since she can monitor her kidney function.  At any time she would like to undergo injections if she continues to have just back pain I would recommend an L4-5 ELLIE if there is leg pain involved potentially's L5 selective nerve root block    -The patient will be treated observantly with self directed symptomatic care. Instructions were given to follow

## 2024-12-09 ENCOUNTER — PATIENT MESSAGE (OUTPATIENT)
Dept: FAMILY MEDICINE CLINIC | Facility: CLINIC | Age: 39
End: 2024-12-09

## 2024-12-09 DIAGNOSIS — E66.812 CLASS 2 OBESITY WITH BODY MASS INDEX (BMI) OF 36.0 TO 36.9 IN ADULT, UNSPECIFIED OBESITY TYPE, UNSPECIFIED WHETHER SERIOUS COMORBIDITY PRESENT: Primary | ICD-10-CM

## 2024-12-09 RX ORDER — PHENTERMINE HYDROCHLORIDE 37.5 MG/1
37.5 TABLET ORAL
Qty: 30 TABLET | Refills: 0 | Status: SHIPPED | OUTPATIENT
Start: 2024-12-09 | End: 2025-01-08

## 2024-12-09 RX ORDER — PHENTERMINE HYDROCHLORIDE 37.5 MG/1
37.5 TABLET ORAL
COMMUNITY
End: 2024-12-09 | Stop reason: SDUPTHER

## 2025-01-11 SDOH — ECONOMIC STABILITY: FOOD INSECURITY: WITHIN THE PAST 12 MONTHS, THE FOOD YOU BOUGHT JUST DIDN'T LAST AND YOU DIDN'T HAVE MONEY TO GET MORE.: NEVER TRUE

## 2025-01-11 SDOH — ECONOMIC STABILITY: INCOME INSECURITY: IN THE LAST 12 MONTHS, WAS THERE A TIME WHEN YOU WERE NOT ABLE TO PAY THE MORTGAGE OR RENT ON TIME?: NO

## 2025-01-11 SDOH — ECONOMIC STABILITY: TRANSPORTATION INSECURITY
IN THE PAST 12 MONTHS, HAS THE LACK OF TRANSPORTATION KEPT YOU FROM MEDICAL APPOINTMENTS OR FROM GETTING MEDICATIONS?: NO

## 2025-01-11 SDOH — ECONOMIC STABILITY: FOOD INSECURITY: WITHIN THE PAST 12 MONTHS, YOU WORRIED THAT YOUR FOOD WOULD RUN OUT BEFORE YOU GOT MONEY TO BUY MORE.: NEVER TRUE

## 2025-01-11 ASSESSMENT — PATIENT HEALTH QUESTIONNAIRE - PHQ9
2. FEELING DOWN, DEPRESSED OR HOPELESS: NOT AT ALL
SUM OF ALL RESPONSES TO PHQ QUESTIONS 1-9: 1
7. TROUBLE CONCENTRATING ON THINGS, SUCH AS READING THE NEWSPAPER OR WATCHING TELEVISION: NOT AT ALL
8. MOVING OR SPEAKING SO SLOWLY THAT OTHER PEOPLE COULD HAVE NOTICED. OR THE OPPOSITE, BEING SO FIGETY OR RESTLESS THAT YOU HAVE BEEN MOVING AROUND A LOT MORE THAN USUAL: NOT AT ALL
8. MOVING OR SPEAKING SO SLOWLY THAT OTHER PEOPLE COULD HAVE NOTICED. OR THE OPPOSITE - BEING SO FIDGETY OR RESTLESS THAT YOU HAVE BEEN MOVING AROUND A LOT MORE THAN USUAL: NOT AT ALL
7. TROUBLE CONCENTRATING ON THINGS, SUCH AS READING THE NEWSPAPER OR WATCHING TELEVISION: NOT AT ALL
1. LITTLE INTEREST OR PLEASURE IN DOING THINGS: NOT AT ALL
5. POOR APPETITE OR OVEREATING: SEVERAL DAYS
1. LITTLE INTEREST OR PLEASURE IN DOING THINGS: NOT AT ALL
4. FEELING TIRED OR HAVING LITTLE ENERGY: NOT AT ALL
6. FEELING BAD ABOUT YOURSELF - OR THAT YOU ARE A FAILURE OR HAVE LET YOURSELF OR YOUR FAMILY DOWN: NOT AT ALL
4. FEELING TIRED OR HAVING LITTLE ENERGY: NOT AT ALL
2. FEELING DOWN, DEPRESSED OR HOPELESS: NOT AT ALL
3. TROUBLE FALLING OR STAYING ASLEEP: NOT AT ALL
3. TROUBLE FALLING OR STAYING ASLEEP: NOT AT ALL
SUM OF ALL RESPONSES TO PHQ QUESTIONS 1-9: 1
SUM OF ALL RESPONSES TO PHQ QUESTIONS 1-9: 1
10. IF YOU CHECKED OFF ANY PROBLEMS, HOW DIFFICULT HAVE THESE PROBLEMS MADE IT FOR YOU TO DO YOUR WORK, TAKE CARE OF THINGS AT HOME, OR GET ALONG WITH OTHER PEOPLE: NOT DIFFICULT AT ALL
SUM OF ALL RESPONSES TO PHQ9 QUESTIONS 1 & 2: 0
5. POOR APPETITE OR OVEREATING: SEVERAL DAYS
SUM OF ALL RESPONSES TO PHQ QUESTIONS 1-9: 1
6. FEELING BAD ABOUT YOURSELF - OR THAT YOU ARE A FAILURE OR HAVE LET YOURSELF OR YOUR FAMILY DOWN: NOT AT ALL
10. IF YOU CHECKED OFF ANY PROBLEMS, HOW DIFFICULT HAVE THESE PROBLEMS MADE IT FOR YOU TO DO YOUR WORK, TAKE CARE OF THINGS AT HOME, OR GET ALONG WITH OTHER PEOPLE: NOT DIFFICULT AT ALL
9. THOUGHTS THAT YOU WOULD BE BETTER OFF DEAD, OR OF HURTING YOURSELF: NOT AT ALL
SUM OF ALL RESPONSES TO PHQ QUESTIONS 1-9: 1
9. THOUGHTS THAT YOU WOULD BE BETTER OFF DEAD, OR OF HURTING YOURSELF: NOT AT ALL

## 2025-01-14 ENCOUNTER — OFFICE VISIT (OUTPATIENT)
Dept: FAMILY MEDICINE CLINIC | Facility: CLINIC | Age: 40
End: 2025-01-14
Payer: COMMERCIAL

## 2025-01-14 VITALS
OXYGEN SATURATION: 98 % | HEIGHT: 68 IN | HEART RATE: 97 BPM | SYSTOLIC BLOOD PRESSURE: 117 MMHG | BODY MASS INDEX: 35.37 KG/M2 | WEIGHT: 233.4 LBS | DIASTOLIC BLOOD PRESSURE: 74 MMHG

## 2025-01-14 DIAGNOSIS — G89.29 CHRONIC BILATERAL LOW BACK PAIN WITHOUT SCIATICA: ICD-10-CM

## 2025-01-14 DIAGNOSIS — E66.812 CLASS 2 OBESITY WITH BODY MASS INDEX (BMI) OF 36.0 TO 36.9 IN ADULT, UNSPECIFIED OBESITY TYPE, UNSPECIFIED WHETHER SERIOUS COMORBIDITY PRESENT: Primary | ICD-10-CM

## 2025-01-14 DIAGNOSIS — M54.50 CHRONIC BILATERAL LOW BACK PAIN WITHOUT SCIATICA: ICD-10-CM

## 2025-01-14 DIAGNOSIS — L21.9 SEBORRHEIC DERMATITIS: ICD-10-CM

## 2025-01-14 PROCEDURE — 99214 OFFICE O/P EST MOD 30 MIN: CPT | Performed by: NURSE PRACTITIONER

## 2025-01-14 RX ORDER — PREGABALIN 50 MG/1
50 CAPSULE ORAL 3 TIMES DAILY
Qty: 90 CAPSULE | Refills: 2 | Status: SHIPPED | OUTPATIENT
Start: 2025-01-14 | End: 2025-04-14

## 2025-01-14 RX ORDER — NYSTATIN 100000 U/G
CREAM TOPICAL
Qty: 60 G | Refills: 5 | Status: SHIPPED | OUTPATIENT
Start: 2025-01-14

## 2025-01-14 RX ORDER — FLUOCINOLONE ACETONIDE 0.11 MG/ML
OIL TOPICAL
Qty: 120 ML | Refills: 2 | Status: SHIPPED | OUTPATIENT
Start: 2025-01-14

## 2025-01-14 RX ORDER — PHENTERMINE HYDROCHLORIDE 37.5 MG/1
37.5 TABLET ORAL
Qty: 30 TABLET | Refills: 0 | Status: SHIPPED | OUTPATIENT
Start: 2025-01-14 | End: 2025-02-13

## 2025-01-14 RX ORDER — MOMETASONE FUROATE 1 MG/G
CREAM TOPICAL
Qty: 60 G | Refills: 2 | Status: SHIPPED | OUTPATIENT
Start: 2025-01-14

## 2025-01-14 RX ORDER — CELECOXIB 200 MG/1
200 CAPSULE ORAL 2 TIMES DAILY PRN
Qty: 60 CAPSULE | Refills: 5 | Status: SHIPPED | OUTPATIENT
Start: 2025-01-14

## 2025-01-14 ASSESSMENT — ENCOUNTER SYMPTOMS
SINUS PAIN: 0
ABDOMINAL DISTENTION: 0
ABDOMINAL PAIN: 0
COLOR CHANGE: 0
TROUBLE SWALLOWING: 0
CHOKING: 0
EYES NEGATIVE: 1
CONSTIPATION: 0
VOMITING: 0
EYE REDNESS: 0
STRIDOR: 0
ANAL BLEEDING: 0
PHOTOPHOBIA: 0
DIARRHEA: 0
EYE DISCHARGE: 0
NAUSEA: 0
RECTAL PAIN: 0
ALLERGIC/IMMUNOLOGIC NEGATIVE: 1
SORE THROAT: 0
RESPIRATORY NEGATIVE: 1
APNEA: 0
GASTROINTESTINAL NEGATIVE: 1
WHEEZING: 0
SINUS PRESSURE: 0
RHINORRHEA: 0
CHEST TIGHTNESS: 0
COUGH: 0
VOICE CHANGE: 0
BLOOD IN STOOL: 0
EYE PAIN: 0
EYE ITCHING: 0
SHORTNESS OF BREATH: 0
FACIAL SWELLING: 0
BACK PAIN: 0

## 2025-01-14 NOTE — PROGRESS NOTES
\"Have you been to the ER, urgent care clinic since your last visit?  Hospitalized since your last visit?\"    NO    “Have you seen or consulted any other health care providers outside our system since your last visit?”    NO     “Have you had a pap smear?”    NO    No cervical cancer screening on file             
  Psychiatric:         Mood and Affect: Mood normal.         Behavior: Behavior normal.         Thought Content: Thought content normal.         Judgment: Judgment normal.           Medical problems and test results were reviewed with the patient today.       ASSESSMENT and PLAN    1. Class 2 obesity with body mass index (BMI) of 36.0 to 36.9 in adult, unspecified obesity type, unspecified whether serious comorbidity present  -     phentermine (ADIPEX-P) 37.5 MG tablet; Take 1 tablet by mouth every morning (before breakfast) for 30 days. Max Daily Amount: 37.5 mg, Disp-30 tablet, R-0Normal    Increased weight increases ASCVD risk for stroke, heart attack and heart disease along with other health risk including but not limited to joints and back pain. Pt was advised on healthy diet that is low in fat, low in cholesterol and low in carbohydrates. Pt was advised on increase in vegetables and increase in exercise activity of moderate intensity of at least 150 minutes per week.     Doing well on phentermine.  No side effects.  Will have patient continue current therapy.    2. Chronic bilateral low back pain without sciatica  -     pregabalin (LYRICA) 50 MG capsule; Take 1 capsule by mouth 3 times daily for 90 days. Max Daily Amount: 150 mg, Disp-90 capsule, R-2Normal  -     celecoxib (CELEBREX) 200 MG capsule; Take 1 capsule by mouth 2 times daily as needed for Pain, Disp-60 capsule, R-5Normal    Has been seen and evaluated by orthopedic.  Appreciate their assistance.  Was recommended to take Celebrex and recent prescription of Lyrica did provide some relief.  She would like to try a higher dose of medication however.  She wishes to wait on any epidural injection or surgery at this time.  She is needing refills on her prescription.  We will have her continue with Celebrex.  Increase Lyrica from 25 mg to 50 mg 3 times daily as needed for pain.  Sedation safety precaution reiterated.    3. Seborrheic dermatitis  -

## 2025-02-14 ENCOUNTER — OFFICE VISIT (OUTPATIENT)
Dept: FAMILY MEDICINE CLINIC | Facility: CLINIC | Age: 40
End: 2025-02-14
Payer: COMMERCIAL

## 2025-02-14 VITALS
WEIGHT: 242.6 LBS | SYSTOLIC BLOOD PRESSURE: 122 MMHG | BODY MASS INDEX: 36.77 KG/M2 | HEART RATE: 94 BPM | DIASTOLIC BLOOD PRESSURE: 80 MMHG | HEIGHT: 68 IN | OXYGEN SATURATION: 99 %

## 2025-02-14 DIAGNOSIS — Z71.84 COUNSELING ABOUT TRAVEL: ICD-10-CM

## 2025-02-14 DIAGNOSIS — E66.812 CLASS 2 OBESITY WITH BODY MASS INDEX (BMI) OF 36.0 TO 36.9 IN ADULT, UNSPECIFIED OBESITY TYPE, UNSPECIFIED WHETHER SERIOUS COMORBIDITY PRESENT: Primary | ICD-10-CM

## 2025-02-14 DIAGNOSIS — K21.9 GASTROESOPHAGEAL REFLUX DISEASE WITHOUT ESOPHAGITIS: ICD-10-CM

## 2025-02-14 PROCEDURE — 99214 OFFICE O/P EST MOD 30 MIN: CPT | Performed by: NURSE PRACTITIONER

## 2025-02-14 RX ORDER — MUPIROCIN 20 MG/G
OINTMENT TOPICAL
Qty: 30 G | Refills: 0 | Status: SHIPPED | OUTPATIENT
Start: 2025-02-14 | End: 2025-02-21

## 2025-02-14 RX ORDER — ONDANSETRON 4 MG/1
TABLET, ORALLY DISINTEGRATING ORAL
Qty: 30 TABLET | Refills: 0 | Status: SHIPPED | OUTPATIENT
Start: 2025-02-14

## 2025-02-14 RX ORDER — SCOPOLAMINE 1 MG/3D
1 PATCH, EXTENDED RELEASE TRANSDERMAL
Qty: 10 PATCH | Refills: 0 | Status: SHIPPED | OUTPATIENT
Start: 2025-02-14

## 2025-02-14 RX ORDER — PHENTERMINE HYDROCHLORIDE 37.5 MG/1
37.5 TABLET ORAL
Qty: 30 TABLET | Refills: 0 | Status: SHIPPED | OUTPATIENT
Start: 2025-02-14 | End: 2025-03-16

## 2025-02-14 RX ORDER — PANTOPRAZOLE SODIUM 40 MG/1
40 TABLET, DELAYED RELEASE ORAL
Qty: 90 TABLET | Refills: 1 | Status: SHIPPED | OUTPATIENT
Start: 2025-02-14

## 2025-02-14 ASSESSMENT — ENCOUNTER SYMPTOMS
DIARRHEA: 0
CONSTIPATION: 0
TROUBLE SWALLOWING: 0
CHOKING: 0
BACK PAIN: 0
VOICE CHANGE: 0
FACIAL SWELLING: 0
APNEA: 0
EYE REDNESS: 0
EYES NEGATIVE: 1
RECTAL PAIN: 0
CHEST TIGHTNESS: 0
COUGH: 0
STRIDOR: 0
EYE DISCHARGE: 0
GASTROINTESTINAL NEGATIVE: 1
SINUS PRESSURE: 0
BLOOD IN STOOL: 0
EYE PAIN: 0
SINUS PAIN: 0
ALLERGIC/IMMUNOLOGIC NEGATIVE: 1
SHORTNESS OF BREATH: 0
VOMITING: 0
COLOR CHANGE: 0
ABDOMINAL PAIN: 0
RHINORRHEA: 0
WHEEZING: 0
SORE THROAT: 0
ANAL BLEEDING: 0
EYE ITCHING: 0
RESPIRATORY NEGATIVE: 1
ABDOMINAL DISTENTION: 0
NAUSEA: 0
PHOTOPHOBIA: 0

## 2025-02-14 ASSESSMENT — PATIENT HEALTH QUESTIONNAIRE - PHQ9
SUM OF ALL RESPONSES TO PHQ QUESTIONS 1-9: 0
1. LITTLE INTEREST OR PLEASURE IN DOING THINGS: NOT AT ALL
SUM OF ALL RESPONSES TO PHQ QUESTIONS 1-9: 0
SUM OF ALL RESPONSES TO PHQ QUESTIONS 1-9: 0
2. FEELING DOWN, DEPRESSED OR HOPELESS: NOT AT ALL
SUM OF ALL RESPONSES TO PHQ9 QUESTIONS 1 & 2: 0
SUM OF ALL RESPONSES TO PHQ QUESTIONS 1-9: 0

## 2025-02-14 NOTE — PROGRESS NOTES
PROGRESS NOTE    Chief Complaint   Patient presents with    Follow-up     4 weeks follow-up for medications       SUBJECTIVE:     Marnie Chaudhry is a 40 y.o. female with hx of  bipolar, anxiety - currently following psychiarty, migraine headache, seasonal allergies, recurrent strep throat, tonsillitis and sinusitis s/p tonsillectomy and septoplasty with turbinate reduction- currently following ENT, vitamin D deficiency, GERD, RILEY -currently on CPAP and following sleep medicine, back pain, and arthritis,, seen today in office for follow up. She reports she has been stress eating unfortunately and has not lost much weight as she would have liked.  She reports [place] and has been quite anxious about traveling.      Past Medical History, Past Surgical History, Family history, Social History, and Medications were all reviewed with the patient today and updated as necessary.       Current Outpatient Medications   Medication Sig Dispense Refill    phentermine (ADIPEX-P) 37.5 MG tablet Take 1 tablet by mouth every morning (before breakfast) for 30 days. Max Daily Amount: 37.5 mg 30 tablet 0    ondansetron (ZOFRAN-ODT) 4 MG disintegrating tablet Take 1 tablet orally disintegrating on tongue every 6 hours as needed for nausea. 30 tablet 0    scopolamine (TRANSDERM-SCOP) transdermal patch Place 1 patch onto the skin every 72 hours 10 patch 0    pantoprazole (PROTONIX) 40 MG tablet Take 1 tablet by mouth every morning (before breakfast) For stomach protection 90 tablet 1    amoxicillin-clavulanate (AUGMENTIN) 875-125 MG per tablet Take 1 tablet by mouth 2 times daily for 10 days With food 20 tablet 0    mupirocin (BACTROBAN) 2 % ointment Apply topically 3 times daily (antibiotic ointment) 30 g 0    pregabalin (LYRICA) 50 MG capsule Take 1 capsule by mouth 3 times daily for 90 days. Max Daily Amount: 150 mg 90 capsule 2    celecoxib (CELEBREX) 200 MG capsule Take 1 capsule by mouth 2

## 2025-03-20 ENCOUNTER — OFFICE VISIT (OUTPATIENT)
Dept: FAMILY MEDICINE CLINIC | Facility: CLINIC | Age: 40
End: 2025-03-20
Payer: COMMERCIAL

## 2025-03-20 VITALS
SYSTOLIC BLOOD PRESSURE: 104 MMHG | WEIGHT: 235.8 LBS | BODY MASS INDEX: 35.74 KG/M2 | HEIGHT: 68 IN | DIASTOLIC BLOOD PRESSURE: 74 MMHG

## 2025-03-20 DIAGNOSIS — M54.50 CHRONIC BILATERAL LOW BACK PAIN WITHOUT SCIATICA: ICD-10-CM

## 2025-03-20 DIAGNOSIS — G89.29 CHRONIC BILATERAL LOW BACK PAIN WITHOUT SCIATICA: ICD-10-CM

## 2025-03-20 DIAGNOSIS — G43.009 MIGRAINE WITHOUT AURA AND WITHOUT STATUS MIGRAINOSUS, NOT INTRACTABLE: ICD-10-CM

## 2025-03-20 DIAGNOSIS — E66.9 OBESITY (BMI 35.0-39.9 WITHOUT COMORBIDITY): Primary | ICD-10-CM

## 2025-03-20 DIAGNOSIS — K21.9 GASTROESOPHAGEAL REFLUX DISEASE WITHOUT ESOPHAGITIS: ICD-10-CM

## 2025-03-20 PROCEDURE — 99214 OFFICE O/P EST MOD 30 MIN: CPT | Performed by: NURSE PRACTITIONER

## 2025-03-20 RX ORDER — PHENTERMINE HYDROCHLORIDE 37.5 MG/1
37.5 TABLET ORAL
Qty: 30 TABLET | Refills: 0 | Status: SHIPPED | OUTPATIENT
Start: 2025-03-20 | End: 2025-04-19

## 2025-03-20 ASSESSMENT — ENCOUNTER SYMPTOMS
WHEEZING: 0
COUGH: 0
SHORTNESS OF BREATH: 0
CONSTIPATION: 0
APNEA: 0
COLOR CHANGE: 0
EYE REDNESS: 0
SINUS PAIN: 0
RECTAL PAIN: 0
TROUBLE SWALLOWING: 0
FACIAL SWELLING: 0
DIARRHEA: 0
BLOOD IN STOOL: 0
RHINORRHEA: 0
BACK PAIN: 0
ANAL BLEEDING: 0
RESPIRATORY NEGATIVE: 1
VOICE CHANGE: 0
EYE PAIN: 0
VOMITING: 0
SINUS PRESSURE: 0
ABDOMINAL DISTENTION: 0
NAUSEA: 0
CHOKING: 0
CHEST TIGHTNESS: 0
SORE THROAT: 0
STRIDOR: 0
ABDOMINAL PAIN: 0
EYE ITCHING: 0
EYES NEGATIVE: 1
ALLERGIC/IMMUNOLOGIC NEGATIVE: 1
EYE DISCHARGE: 0
GASTROINTESTINAL NEGATIVE: 1
PHOTOPHOBIA: 0

## 2025-03-20 ASSESSMENT — PATIENT HEALTH QUESTIONNAIRE - PHQ9
SUM OF ALL RESPONSES TO PHQ QUESTIONS 1-9: 0
3. TROUBLE FALLING OR STAYING ASLEEP: NOT AT ALL
SUM OF ALL RESPONSES TO PHQ QUESTIONS 1-9: 0
8. MOVING OR SPEAKING SO SLOWLY THAT OTHER PEOPLE COULD HAVE NOTICED. OR THE OPPOSITE, BEING SO FIGETY OR RESTLESS THAT YOU HAVE BEEN MOVING AROUND A LOT MORE THAN USUAL: NOT AT ALL
10. IF YOU CHECKED OFF ANY PROBLEMS, HOW DIFFICULT HAVE THESE PROBLEMS MADE IT FOR YOU TO DO YOUR WORK, TAKE CARE OF THINGS AT HOME, OR GET ALONG WITH OTHER PEOPLE: NOT DIFFICULT AT ALL
1. LITTLE INTEREST OR PLEASURE IN DOING THINGS: NOT AT ALL
4. FEELING TIRED OR HAVING LITTLE ENERGY: NOT AT ALL
2. FEELING DOWN, DEPRESSED OR HOPELESS: NOT AT ALL
SUM OF ALL RESPONSES TO PHQ QUESTIONS 1-9: 0
1. LITTLE INTEREST OR PLEASURE IN DOING THINGS: NOT AT ALL
SUM OF ALL RESPONSES TO PHQ QUESTIONS 1-9: 0
SUM OF ALL RESPONSES TO PHQ QUESTIONS 1-9: 0
6. FEELING BAD ABOUT YOURSELF - OR THAT YOU ARE A FAILURE OR HAVE LET YOURSELF OR YOUR FAMILY DOWN: NOT AT ALL
SUM OF ALL RESPONSES TO PHQ QUESTIONS 1-9: 0
5. POOR APPETITE OR OVEREATING: NOT AT ALL
9. THOUGHTS THAT YOU WOULD BE BETTER OFF DEAD, OR OF HURTING YOURSELF: NOT AT ALL
2. FEELING DOWN, DEPRESSED OR HOPELESS: NOT AT ALL
SUM OF ALL RESPONSES TO PHQ QUESTIONS 1-9: 0
7. TROUBLE CONCENTRATING ON THINGS, SUCH AS READING THE NEWSPAPER OR WATCHING TELEVISION: NOT AT ALL
SUM OF ALL RESPONSES TO PHQ QUESTIONS 1-9: 0

## 2025-03-20 NOTE — PROGRESS NOTES
\"Have you been to the ER, urgent care clinic since your last visit?  Hospitalized since your last visit?\"    NO    “Have you seen or consulted any other health care providers outside our system since your last visit?”    NO     “Have you had a pap smear?”    NO    No cervical cancer screening on file             
the treatment plan as well as documenting on the day of the visit. Greater than 50% of this visit was spent counseling the patient about test results, prognosis, importance of compliance, education about disease process, benefits of medications, instructions for management of acute symptoms, and follow up plans.    Return in about 4 weeks (around 4/17/2025) for Medication follow up.     Giovana Zapata, APRN - CNP

## 2025-04-25 ENCOUNTER — OFFICE VISIT (OUTPATIENT)
Dept: FAMILY MEDICINE CLINIC | Facility: CLINIC | Age: 40
End: 2025-04-25

## 2025-04-25 VITALS
WEIGHT: 237 LBS | DIASTOLIC BLOOD PRESSURE: 88 MMHG | OXYGEN SATURATION: 99 % | HEART RATE: 89 BPM | BODY MASS INDEX: 35.92 KG/M2 | SYSTOLIC BLOOD PRESSURE: 132 MMHG | HEIGHT: 68 IN

## 2025-04-25 DIAGNOSIS — E66.9 OBESITY (BMI 35.0-39.9 WITHOUT COMORBIDITY): ICD-10-CM

## 2025-04-25 DIAGNOSIS — Z00.00 HEALTHCARE MAINTENANCE: ICD-10-CM

## 2025-04-25 DIAGNOSIS — Z01.84 LACK OF IMMUNITY TO HEPATITIS B VIRUS DEMONSTRATED BY SEROLOGIC TEST: ICD-10-CM

## 2025-04-25 DIAGNOSIS — E55.9 VITAMIN D DEFICIENCY: ICD-10-CM

## 2025-04-25 DIAGNOSIS — M54.50 CHRONIC BILATERAL LOW BACK PAIN WITHOUT SCIATICA: Primary | ICD-10-CM

## 2025-04-25 DIAGNOSIS — Z79.899 MEDICATION MANAGEMENT: ICD-10-CM

## 2025-04-25 DIAGNOSIS — G89.29 CHRONIC BILATERAL LOW BACK PAIN WITHOUT SCIATICA: Primary | ICD-10-CM

## 2025-04-25 DIAGNOSIS — Z00.00 LABORATORY EXAMINATION ORDERED AS PART OF A COMPLETE PHYSICAL EXAMINATION: ICD-10-CM

## 2025-04-25 DIAGNOSIS — Z13.1 SCREENING FOR DIABETES MELLITUS: ICD-10-CM

## 2025-04-25 RX ORDER — TOPIRAMATE 25 MG/1
25 TABLET, FILM COATED ORAL NIGHTLY
Qty: 90 TABLET | Refills: 1 | Status: SHIPPED | OUTPATIENT
Start: 2025-04-25

## 2025-04-25 RX ORDER — PREGABALIN 75 MG/1
75 CAPSULE ORAL 3 TIMES DAILY
Qty: 90 CAPSULE | Refills: 2 | Status: SHIPPED | OUTPATIENT
Start: 2025-04-25 | End: 2025-07-24

## 2025-04-25 RX ORDER — METOPROLOL SUCCINATE 25 MG/1
25 TABLET, EXTENDED RELEASE ORAL DAILY
Qty: 90 TABLET | Refills: 3 | Status: SHIPPED | OUTPATIENT
Start: 2025-04-25

## 2025-04-25 RX ORDER — PHENTERMINE HYDROCHLORIDE 37.5 MG/1
37.5 TABLET ORAL
Qty: 30 TABLET | Refills: 0 | Status: SHIPPED | OUTPATIENT
Start: 2025-04-25 | End: 2025-05-25

## 2025-04-25 ASSESSMENT — PATIENT HEALTH QUESTIONNAIRE - PHQ9
6. FEELING BAD ABOUT YOURSELF - OR THAT YOU ARE A FAILURE OR HAVE LET YOURSELF OR YOUR FAMILY DOWN: NOT AT ALL
2. FEELING DOWN, DEPRESSED OR HOPELESS: NOT AT ALL
SUM OF ALL RESPONSES TO PHQ QUESTIONS 1-9: 0
5. POOR APPETITE OR OVEREATING: NOT AT ALL
1. LITTLE INTEREST OR PLEASURE IN DOING THINGS: NOT AT ALL
SUM OF ALL RESPONSES TO PHQ QUESTIONS 1-9: 0
9. THOUGHTS THAT YOU WOULD BE BETTER OFF DEAD, OR OF HURTING YOURSELF: NOT AT ALL
SUM OF ALL RESPONSES TO PHQ QUESTIONS 1-9: 0
SUM OF ALL RESPONSES TO PHQ QUESTIONS 1-9: 0
8. MOVING OR SPEAKING SO SLOWLY THAT OTHER PEOPLE COULD HAVE NOTICED. OR THE OPPOSITE, BEING SO FIGETY OR RESTLESS THAT YOU HAVE BEEN MOVING AROUND A LOT MORE THAN USUAL: NOT AT ALL
10. IF YOU CHECKED OFF ANY PROBLEMS, HOW DIFFICULT HAVE THESE PROBLEMS MADE IT FOR YOU TO DO YOUR WORK, TAKE CARE OF THINGS AT HOME, OR GET ALONG WITH OTHER PEOPLE: NOT DIFFICULT AT ALL
7. TROUBLE CONCENTRATING ON THINGS, SUCH AS READING THE NEWSPAPER OR WATCHING TELEVISION: NOT AT ALL
SUM OF ALL RESPONSES TO PHQ QUESTIONS 1-9: 0
4. FEELING TIRED OR HAVING LITTLE ENERGY: NOT AT ALL
2. FEELING DOWN, DEPRESSED OR HOPELESS: NOT AT ALL
3. TROUBLE FALLING OR STAYING ASLEEP: NOT AT ALL
SUM OF ALL RESPONSES TO PHQ QUESTIONS 1-9: 0

## 2025-04-25 ASSESSMENT — ENCOUNTER SYMPTOMS
DIARRHEA: 0
VOMITING: 0
SINUS PAIN: 1
SINUS PRESSURE: 1
EYE PAIN: 0
STRIDOR: 0
RECTAL PAIN: 0
WHEEZING: 0
NAUSEA: 0
SHORTNESS OF BREATH: 0
EYE REDNESS: 0
VOICE CHANGE: 0
FACIAL SWELLING: 0
PHOTOPHOBIA: 0
ABDOMINAL PAIN: 0
ABDOMINAL DISTENTION: 0
ALLERGIC/IMMUNOLOGIC NEGATIVE: 1
BACK PAIN: 1
CHEST TIGHTNESS: 0
SORE THROAT: 0
BLOOD IN STOOL: 0
COUGH: 0
APNEA: 0
COLOR CHANGE: 0
CHOKING: 0
CONSTIPATION: 0
EYE DISCHARGE: 0
GASTROINTESTINAL NEGATIVE: 1
EYES NEGATIVE: 1
ANAL BLEEDING: 0
RHINORRHEA: 0
TROUBLE SWALLOWING: 0
EYE ITCHING: 0

## 2025-04-25 NOTE — PROGRESS NOTES
PROGRESS NOTE    Chief Complaint   Patient presents with    Medication Check       SUBJECTIVE:         History of Present Illness  The patient presents for evaluation of back pain and weight management.    The patient reports that her back pain has recurred, which they attribute to their current medication regimen. They are uncertain about the benefit of an increased dosage. Celebrex and Lyrica have been prescribed; Lyrica is taken twice daily without causing drowsiness. Celebrex was previously effective for back pain but is now less effective. The patient discussed the possibility of injections or surgery but is apprehensive due to a past adverse reaction to an epidural injection and a fear of needles. The severe back pain interferes with daily activities such as standing and doing dishes. The patient believes that the back pain limits their mobility and contributes to their inability to lose weight. She has not sought physical therapy but attempt exercises at home. She has tizanidine as a muscle relaxant at night.    The patient reports no issues with topiramate and no constipation. Despite her weight loss goals, they have not achieved them, and recommended injections are unaffordable.    The patient continues to take metoprolol and Astelin nasal spray, although they dislike the taste. No refills are needed. She has ongoing sinus issues but do not need refills.    PAST SURGICAL HISTORY: The patient has a history of an emergency .          Past Medical History, Past Surgical History, Family history, Social History, and Medications were all reviewed with the patient today and updated as necessary.       Current Outpatient Medications   Medication Sig Dispense Refill    phentermine (ADIPEX-P) 37.5 MG tablet Take 1 tablet by mouth every morning (before breakfast) for 30 days. Max Daily Amount: 37.5 mg 30 tablet 0    topiramate (TOPAMAX) 25 MG tablet Take 1 tablet by mouth nightly 90 tablet 1    metoprolol

## 2025-04-25 NOTE — PROGRESS NOTES
Have you been to the ER, urgent care clinic since your last visit?  Hospitalized since your last visit?   NO    Have you seen or consulted any other health care providers outside our system since your last visit?   NO     “Have you had a pap smear?”    NO

## 2025-05-15 ENCOUNTER — LAB (OUTPATIENT)
Dept: FAMILY MEDICINE CLINIC | Facility: CLINIC | Age: 40
End: 2025-05-15
Payer: COMMERCIAL

## 2025-05-15 DIAGNOSIS — E55.9 VITAMIN D DEFICIENCY: ICD-10-CM

## 2025-05-15 DIAGNOSIS — Z13.1 SCREENING FOR DIABETES MELLITUS: ICD-10-CM

## 2025-05-15 DIAGNOSIS — Z00.00 LABORATORY EXAMINATION ORDERED AS PART OF A COMPLETE PHYSICAL EXAMINATION: ICD-10-CM

## 2025-05-15 DIAGNOSIS — Z01.84 LACK OF IMMUNITY TO HEPATITIS B VIRUS DEMONSTRATED BY SEROLOGIC TEST: ICD-10-CM

## 2025-05-15 LAB
25(OH)D3 SERPL-MCNC: 30.2 NG/ML (ref 30–100)
ALBUMIN SERPL-MCNC: 3.4 G/DL (ref 3.5–5)
ALBUMIN/GLOB SERPL: 1.2 (ref 1–1.9)
ALP SERPL-CCNC: 98 U/L (ref 35–104)
ALT SERPL-CCNC: 24 U/L (ref 8–45)
ANION GAP SERPL CALC-SCNC: 10 MMOL/L (ref 7–16)
AST SERPL-CCNC: 20 U/L (ref 15–37)
BASOPHILS # BLD: 0.11 K/UL (ref 0–0.2)
BASOPHILS NFR BLD: 1.1 % (ref 0–2)
BILIRUB SERPL-MCNC: 0.4 MG/DL (ref 0–1.2)
BILIRUBIN, URINE, POC: ABNORMAL
BLOOD URINE, POC: NEGATIVE
BUN SERPL-MCNC: 14 MG/DL (ref 6–23)
CALCIUM SERPL-MCNC: 9.8 MG/DL (ref 8.8–10.2)
CHLORIDE SERPL-SCNC: 109 MMOL/L (ref 98–107)
CHOLEST SERPL-MCNC: 180 MG/DL (ref 0–200)
CO2 SERPL-SCNC: 22 MMOL/L (ref 20–29)
CREAT SERPL-MCNC: 1.08 MG/DL (ref 0.6–1.1)
DIFFERENTIAL METHOD BLD: ABNORMAL
EOSINOPHIL # BLD: 0.87 K/UL (ref 0–0.8)
EOSINOPHIL NFR BLD: 9.1 % (ref 0.5–7.8)
ERYTHROCYTE [DISTWIDTH] IN BLOOD BY AUTOMATED COUNT: 13.5 % (ref 11.9–14.6)
EST. AVERAGE GLUCOSE BLD GHB EST-MCNC: 114 MG/DL
GLOBULIN SER CALC-MCNC: 2.9 G/DL (ref 2.3–3.5)
GLUCOSE SERPL-MCNC: 110 MG/DL (ref 70–99)
GLUCOSE URINE, POC: NEGATIVE
HBA1C MFR BLD: 5.6 % (ref 0–5.6)
HBV SURFACE AB SERPL IA-ACNC: 8.23 MIU/ML
HCT VFR BLD AUTO: 41.2 % (ref 35.8–46.3)
HDLC SERPL-MCNC: 46 MG/DL (ref 40–60)
HDLC SERPL: 3.9 (ref 0–5)
HGB BLD-MCNC: 13.3 G/DL (ref 11.7–15.4)
IMM GRANULOCYTES # BLD AUTO: 0.11 K/UL (ref 0–0.5)
IMM GRANULOCYTES NFR BLD AUTO: 1.1 % (ref 0–5)
KETONES, URINE, POC: ABNORMAL
LDLC SERPL CALC-MCNC: 94 MG/DL (ref 0–100)
LEUKOCYTE ESTERASE, URINE, POC: NEGATIVE
LYMPHOCYTES # BLD: 4.45 K/UL (ref 0.5–4.6)
LYMPHOCYTES NFR BLD: 46.4 % (ref 13–44)
MCH RBC QN AUTO: 28.6 PG (ref 26.1–32.9)
MCHC RBC AUTO-ENTMCNC: 32.3 G/DL (ref 31.4–35)
MCV RBC AUTO: 88.6 FL (ref 82–102)
MONOCYTES # BLD: 0.76 K/UL (ref 0.1–1.3)
MONOCYTES NFR BLD: 7.9 % (ref 4–12)
NEUTS SEG # BLD: 3.3 K/UL (ref 1.7–8.2)
NEUTS SEG NFR BLD: 34.4 % (ref 43–78)
NITRITE, URINE, POC: NEGATIVE
NRBC # BLD: 0 K/UL (ref 0–0.2)
PH, URINE, POC: 5.5 (ref 4.6–8)
PLATELET # BLD AUTO: 157 K/UL (ref 150–450)
PLATELET COMMENT: ABNORMAL
PMV BLD AUTO: 9.5 FL (ref 9.4–12.3)
POTASSIUM SERPL-SCNC: 4 MMOL/L (ref 3.5–5.1)
PROT SERPL-MCNC: 6.3 G/DL (ref 6.3–8.2)
PROTEIN,URINE, POC: NEGATIVE
RBC # BLD AUTO: 4.65 M/UL (ref 4.05–5.2)
RBC MORPH BLD: ABNORMAL
SODIUM SERPL-SCNC: 141 MMOL/L (ref 136–145)
SPECIFIC GRAVITY, URINE, POC: 1.03 (ref 1–1.03)
TRIGL SERPL-MCNC: 200 MG/DL (ref 0–150)
TSH W FREE THYROID IF ABNORMAL: 2.69 UIU/ML (ref 0.27–4.2)
URINALYSIS CLARITY, POC: CLEAR
URINALYSIS COLOR, POC: ABNORMAL
UROBILINOGEN, POC: NORMAL
VLDLC SERPL CALC-MCNC: 40 MG/DL (ref 6–23)
WBC # BLD AUTO: 9.6 K/UL (ref 4.3–11.1)
WBC MORPH BLD: ABNORMAL

## 2025-05-15 PROCEDURE — 81003 URINALYSIS AUTO W/O SCOPE: CPT | Performed by: NURSE PRACTITIONER

## 2025-05-23 ENCOUNTER — OFFICE VISIT (OUTPATIENT)
Dept: FAMILY MEDICINE CLINIC | Facility: CLINIC | Age: 40
End: 2025-05-23
Payer: COMMERCIAL

## 2025-05-23 VITALS
WEIGHT: 243.2 LBS | HEART RATE: 90 BPM | SYSTOLIC BLOOD PRESSURE: 116 MMHG | BODY MASS INDEX: 36.86 KG/M2 | HEIGHT: 68 IN | OXYGEN SATURATION: 92 % | DIASTOLIC BLOOD PRESSURE: 76 MMHG

## 2025-05-23 DIAGNOSIS — M54.50 CHRONIC BILATERAL LOW BACK PAIN WITHOUT SCIATICA: ICD-10-CM

## 2025-05-23 DIAGNOSIS — E66.9 OBESITY (BMI 35.0-39.9 WITHOUT COMORBIDITY): Primary | ICD-10-CM

## 2025-05-23 DIAGNOSIS — Z01.84 LACK OF IMMUNITY TO HEPATITIS B VIRUS DEMONSTRATED BY SEROLOGIC TEST: ICD-10-CM

## 2025-05-23 DIAGNOSIS — Z23 ENCOUNTER FOR IMMUNIZATION: ICD-10-CM

## 2025-05-23 DIAGNOSIS — G89.29 CHRONIC BILATERAL LOW BACK PAIN WITHOUT SCIATICA: ICD-10-CM

## 2025-05-23 PROCEDURE — 99214 OFFICE O/P EST MOD 30 MIN: CPT | Performed by: NURSE PRACTITIONER

## 2025-05-23 RX ORDER — PHENDIMETRAZINE TARTRATE 35 MG/1
1 TABLET ORAL 3 TIMES DAILY
Qty: 90 TABLET | Refills: 0 | Status: SHIPPED | OUTPATIENT
Start: 2025-05-23 | End: 2025-06-22

## 2025-05-23 ASSESSMENT — ENCOUNTER SYMPTOMS
EYE REDNESS: 0
GASTROINTESTINAL NEGATIVE: 1
EYE ITCHING: 0
EYE DISCHARGE: 0
ANAL BLEEDING: 0
CHEST TIGHTNESS: 0
SINUS PAIN: 1
DIARRHEA: 0
RHINORRHEA: 0
SINUS PRESSURE: 1
CHOKING: 0
RECTAL PAIN: 0
BACK PAIN: 1
EYE PAIN: 0
CONSTIPATION: 0
SHORTNESS OF BREATH: 0
COLOR CHANGE: 0
SORE THROAT: 0
ALLERGIC/IMMUNOLOGIC NEGATIVE: 1
ABDOMINAL DISTENTION: 0
BLOOD IN STOOL: 0
VOICE CHANGE: 0
WHEEZING: 0
APNEA: 0
PHOTOPHOBIA: 0
STRIDOR: 0
TROUBLE SWALLOWING: 0
ABDOMINAL PAIN: 0
COUGH: 0
FACIAL SWELLING: 0
EYES NEGATIVE: 1
NAUSEA: 0
VOMITING: 0

## 2025-05-23 ASSESSMENT — PATIENT HEALTH QUESTIONNAIRE - PHQ9
1. LITTLE INTEREST OR PLEASURE IN DOING THINGS: NOT AT ALL
SUM OF ALL RESPONSES TO PHQ QUESTIONS 1-9: 0
2. FEELING DOWN, DEPRESSED OR HOPELESS: NOT AT ALL

## 2025-05-23 NOTE — PROGRESS NOTES
conversation to generate a clinical note, and support improvement of the AI technology. The patient (or guardian, if applicable) and other individuals in attendance at the appointment consented to the use of AI, including the recording.         Greater than 50% of this visit was spent counseling the patient about test results, prognosis, importance of compliance, education about disease process, benefits of medications, instructions for management of acute symptoms, and follow up plans.    Return in about 4 weeks (around 6/20/2025) for Follow up with fasting labs prior.     Giovana Zapata, APRN - CNP

## 2025-05-28 ENCOUNTER — OFFICE VISIT (OUTPATIENT)
Age: 40
End: 2025-05-28
Payer: COMMERCIAL

## 2025-05-28 VITALS — BODY MASS INDEX: 36.99 KG/M2 | HEIGHT: 68 IN | WEIGHT: 244.1 LBS

## 2025-05-28 DIAGNOSIS — M54.50 ACUTE BILATERAL LOW BACK PAIN, UNSPECIFIED WHETHER SCIATICA PRESENT: Primary | ICD-10-CM

## 2025-05-28 DIAGNOSIS — M47.816 LUMBAR FACET ARTHROPATHY: ICD-10-CM

## 2025-05-28 DIAGNOSIS — M48.061 STENOSIS OF LATERAL RECESS OF LUMBAR SPINE: ICD-10-CM

## 2025-05-28 PROCEDURE — 99214 OFFICE O/P EST MOD 30 MIN: CPT | Performed by: NURSE PRACTITIONER

## 2025-05-28 RX ORDER — ALPRAZOLAM 0.5 MG
0.5 TABLET ORAL ONCE
Qty: 1 TABLET | Refills: 0 | Status: SHIPPED | OUTPATIENT
Start: 2025-05-28 | End: 2025-05-28

## 2025-05-28 NOTE — PROGRESS NOTES
including AP and lateral views of the lumbar spine were independently reviewed today: Bilateral hip joints with normal spacing.  There is degenerative disc disease at L4-5.  No anterior listhesis noted.  Lower lumbar facet arthropathy is noted.    Interpretation: Lumbar facet arthropathy and degenerative disc disease greatest at L4-5    MRI Results (most recent):  MRI Result (most recent):  MRI LUMBAR SPINE WO CONTRAST 11/13/2024    Narrative  EXAMINATION: MRI LUMBAR SPINE 11/13/2024 3:34 PM    ACCESSION NUMBER: [id number]    INDICATION: Low back pain, unspecified; Other chronic pain chronic low back pain  worsening over the last 2 months    COMPARISON: Lumbar spine x-rays 9/17/2024    TECHNIQUE: Multisequence, multiplanar MR images were obtained of the lumbar  spine without the administration of intravenous contrast.    FINDINGS: For the purposes of this examination, there will be 5 lumbar-type  vertebral bodies with vertebral body numbering performed with the designation of  the last well formed intervertebral disc as L5-S1.    Vertebral body heights are preserved. No evidence of acute compression fracture  or suspicious osseous lesion.    The visualized portions of the distal spinal cord are of normal caliber and  signal characteristics.. The conus medullaris terminates at the L1 level. The  cauda equina is unremarkable.    The visualized abdominal soft tissues are unremarkable.      Level specific findings:    T12-L1: Unremarkable.    L1-L2: Unremarkable.    L2-L3: Unremarkable.    L3-L4: Slight retrolisthesis of L3 on L4, with a minimal disc bulge and mild  facet arthropathy. Minimal bilateral neuroforaminal narrowing. No significant  canal stenosis.    L4-L5: Retrolisthesis of L4 on L5. Broad-based central through left subarticular  protrusion, moderately narrowing the spinal canal, narrowing both subarticular  recesses, and likely contacting the traversing left greater than right L5 nerve  roots. The

## 2025-06-12 ENCOUNTER — OFFICE VISIT (OUTPATIENT)
Dept: ORTHOPEDIC SURGERY | Age: 40
End: 2025-06-12

## 2025-06-12 DIAGNOSIS — M47.816 LUMBAR FACET ARTHROPATHY: Primary | ICD-10-CM

## 2025-06-12 RX ORDER — TRIAMCINOLONE ACETONIDE 40 MG/ML
40 INJECTION, SUSPENSION INTRA-ARTICULAR; INTRAMUSCULAR ONCE
Status: COMPLETED | OUTPATIENT
Start: 2025-06-12 | End: 2025-06-12

## 2025-06-12 RX ADMIN — TRIAMCINOLONE ACETONIDE 40 MG: 40 INJECTION, SUSPENSION INTRA-ARTICULAR; INTRAMUSCULAR at 09:16

## 2025-06-12 NOTE — PROGRESS NOTES
Name: Marnie Chaudhry  YOB: 1985  Gender: female  MRN: [id number]    Procedure: Bilateral  L4-L5 and L5-S1 facet joint injections     Precautions: Marnie Chaudhry reportsprior sensitivity to steroid, local anesthetic, iodine, or shellfish. She has vomited when eating shellfish before; no omnipaque was used today.    The procedure was discussed at length with the patient and informed consent was signed and placed in the chart. The patient was placed in a prone position on the fluoroscopy table and the skin was prepped and draped in a routine sterile fashion. The areas to be injected were each anesthetized with 1% lidocaine.    Next, a 25-gauge 3.5 inch spinal needle was carefully advanced under fluoroscopic guidance to the rightL5 - S1 facet joint. Aspiration was negative. Once proper placement was confirmed, 1 ml of 0.25% Marcaine and  0.5 mL 40 mg/mL kenalog were injected through the spinal needle.     The above procedure was then repeated through the rightL4 - L5, and left L5 - S1, and leftL4 - L5 facet joints.      Fluoroscopic guidance was used intermittently over a 10-minute period to insure proper needle placement and her safety. A hard copy of the fluoroscopic images has been placed in her chart and is saved on the C-arm hard drive. She was monitored after the procedure and discharged home in a stable fashion with a routine follow up.     Procedural Diagnosis:     ICD-10-CM    1. Lumbar facet arthropathy  M47.816 FL INJ LUMB/SAC FACET SINGLE LEVEL     XR INJ FACET LUMB SACRAL 2ND LVL     triamcinolone acetonide (KENALOG-40) injection 40 mg           TULIO PORTER MD  06/12/25

## 2025-06-25 ENCOUNTER — TELEMEDICINE (OUTPATIENT)
Dept: FAMILY MEDICINE CLINIC | Facility: CLINIC | Age: 40
End: 2025-06-25
Payer: COMMERCIAL

## 2025-06-25 DIAGNOSIS — M54.50 CHRONIC BILATERAL LOW BACK PAIN WITHOUT SCIATICA: Primary | ICD-10-CM

## 2025-06-25 DIAGNOSIS — G89.29 CHRONIC BILATERAL LOW BACK PAIN WITHOUT SCIATICA: Primary | ICD-10-CM

## 2025-06-25 DIAGNOSIS — E66.9 OBESITY (BMI 35.0-39.9 WITHOUT COMORBIDITY): ICD-10-CM

## 2025-06-25 PROCEDURE — 99214 OFFICE O/P EST MOD 30 MIN: CPT | Performed by: NURSE PRACTITIONER

## 2025-06-25 RX ORDER — PREGABALIN 100 MG/1
100 CAPSULE ORAL 3 TIMES DAILY PRN
Qty: 90 CAPSULE | Refills: 2 | Status: SHIPPED | OUTPATIENT
Start: 2025-06-25 | End: 2025-09-23

## 2025-06-25 ASSESSMENT — PATIENT HEALTH QUESTIONNAIRE - PHQ9
SUM OF ALL RESPONSES TO PHQ QUESTIONS 1-9: 0
1. LITTLE INTEREST OR PLEASURE IN DOING THINGS: NOT AT ALL
SUM OF ALL RESPONSES TO PHQ QUESTIONS 1-9: 0
2. FEELING DOWN, DEPRESSED OR HOPELESS: NOT AT ALL
SUM OF ALL RESPONSES TO PHQ QUESTIONS 1-9: 0
SUM OF ALL RESPONSES TO PHQ QUESTIONS 1-9: 0

## 2025-06-26 ENCOUNTER — OFFICE VISIT (OUTPATIENT)
Age: 40
End: 2025-06-26
Payer: COMMERCIAL

## 2025-06-26 DIAGNOSIS — M51.362 DEGENERATION OF INTERVERTEBRAL DISC OF LUMBAR REGION WITH DISCOGENIC BACK PAIN AND LOWER EXTREMITY PAIN: Primary | ICD-10-CM

## 2025-06-26 DIAGNOSIS — M48.061 STENOSIS OF LATERAL RECESS OF LUMBAR SPINE: ICD-10-CM

## 2025-06-26 PROCEDURE — 99214 OFFICE O/P EST MOD 30 MIN: CPT | Performed by: NURSE PRACTITIONER

## 2025-06-26 RX ORDER — LORAZEPAM 1 MG/1
1 TABLET ORAL ONCE
Qty: 1 TABLET | Refills: 0 | Status: SHIPPED | OUTPATIENT
Start: 2025-06-26 | End: 2025-06-26

## 2025-06-26 NOTE — PROGRESS NOTES
right L5 nerve  roots..    2.  Lumbar spine degenerative features are otherwise as fully detailed above.      Electronically signed by XI DELVALLE    MRI reviewed again by myself.  There is a central disc protrusion causing stenosis at L4-5.    Assessment/Plan:        ICD-10-CM    1. Degeneration of intervertebral disc of lumbar region with discogenic back pain and lower extremity pain  M51.362 Injection Authorization - Spine      2. Stenosis of lateral recess of lumbar spine  M48.061 Injection Authorization - Spine     LORazepam (ATIVAN) 1 MG tablet           Patient I discussed that since the facet injections did not work we will trial an L4-5 ELLIE.  If this is beneficial she may be a candidate for discectomy surgery.  She does have an actively degenerating disc at L4-5.  I will give her Ativan to take prior to the injection due to anxiety.    - Injection: The patient will be referred for a lumbar steroid injection to help reduce the symptoms. The patient understands the risks including hyperglycemia, immunosuppression, meningitis, cerebrospinal fluid leak, epidural hematoma, and reaction to medication. The patient may benefit from additional injections depending on the response. The injection should be a L4-5 ELLIE     Orders Placed This Encounter   Medications    LORazepam (ATIVAN) 1 MG tablet     Sig: Take 1 tablet by mouth once for 1 dose. Take 1 hour prior to injection Max Daily Amount: 1 mg     Dispense:  1 tablet     Refill:  0        Orders Placed This Encounter   Procedures    Injection Authorization - Spine        4 This is a chronic illness/condition with exacerbation and progression      Return for Injection follow up.     TIARA Paiz - CNP  06/26/25      Elements of this note were created using speech recognition software.  As such, errors of speech recognition may be present.

## 2025-06-30 ASSESSMENT — ENCOUNTER SYMPTOMS
RECTAL PAIN: 0
STRIDOR: 0
CHEST TIGHTNESS: 0
EYE REDNESS: 0
DIARRHEA: 0
COLOR CHANGE: 0
SORE THROAT: 0
FACIAL SWELLING: 0
EYES NEGATIVE: 1
EYE ITCHING: 0
RHINORRHEA: 0
VOMITING: 0
CONSTIPATION: 0
EYE PAIN: 0
VOICE CHANGE: 0
APNEA: 0
CHOKING: 0
GASTROINTESTINAL NEGATIVE: 1
ABDOMINAL DISTENTION: 0
ANAL BLEEDING: 0
BACK PAIN: 1
SHORTNESS OF BREATH: 0
COUGH: 0
NAUSEA: 0
ALLERGIC/IMMUNOLOGIC NEGATIVE: 1
EYE DISCHARGE: 0
SINUS PRESSURE: 1
PHOTOPHOBIA: 0
SINUS PAIN: 1
ABDOMINAL PAIN: 0
WHEEZING: 0
BLOOD IN STOOL: 0
TROUBLE SWALLOWING: 0

## 2025-06-30 NOTE — PROGRESS NOTES
Date    CHOL 180 05/15/2025    TRIG 200 (H) 05/15/2025    HDL 46 05/15/2025    LDL 94 05/15/2025    VLDL 40 (H) 05/15/2025    CHOLHDLRATIO 3.9 05/15/2025         Assessment & Plan  1. Back pain: Acute on chronic  - Reports worsening back pain following an injection received 2 weeks ago.  - Continue taking Celebrex as needed.  - Lyrica 75 mg, 2 capsules before bedtime, totaling 150 mg.  - Lyrica 100 mg prescribed for daytime use as needed.  - Prescription sent to pharmacy.    2. Weight management.  - Reported that the short-acting appetite suppressant was ineffective.  - Discontinue the appetite suppressant for now.  - Continue with healthy diet and increased physical activity    Follow-up  - Follow-up in 3 months              Elements of this note have been dictated using speech recognition software. As a result, errors of speech recognition may have occurred.    The patient (or guardian, if applicable) and other individuals in attendance with the patient were advised that Artificial Intelligence will be utilized during this visit to record, process the conversation to generate a clinical note, and support improvement of the AI technology. The patient (or guardian, if applicable) and other individuals in attendance at the appointment consented to the use of AI, including the recording.      Return in about 3 months (around 9/25/2025) for back pain and med follow up.     Giovana Zapata, APRN - CNP

## 2025-07-10 ENCOUNTER — OFFICE VISIT (OUTPATIENT)
Dept: ORTHOPEDIC SURGERY | Age: 40
End: 2025-07-10

## 2025-07-10 DIAGNOSIS — M47.816 LUMBAR FACET ARTHROPATHY: ICD-10-CM

## 2025-07-10 DIAGNOSIS — M48.061 STENOSIS OF LATERAL RECESS OF LUMBAR SPINE: ICD-10-CM

## 2025-07-10 DIAGNOSIS — M51.362 DEGENERATION OF INTERVERTEBRAL DISC OF LUMBAR REGION WITH DISCOGENIC BACK PAIN AND LOWER EXTREMITY PAIN: Primary | ICD-10-CM

## 2025-07-10 RX ORDER — BETAMETHASONE SODIUM PHOSPHATE AND BETAMETHASONE ACETATE 3; 3 MG/ML; MG/ML
12 INJECTION, SUSPENSION INTRA-ARTICULAR; INTRALESIONAL; INTRAMUSCULAR; SOFT TISSUE ONCE
Status: COMPLETED | OUTPATIENT
Start: 2025-07-10 | End: 2025-07-10

## 2025-07-10 RX ADMIN — BETAMETHASONE SODIUM PHOSPHATE AND BETAMETHASONE ACETATE 12 MG: 3; 3 INJECTION, SUSPENSION INTRA-ARTICULAR; INTRALESIONAL; INTRAMUSCULAR; SOFT TISSUE at 09:34

## 2025-07-10 NOTE — PROGRESS NOTES
Name: Marnie Chaudhry  YOB: 1985  Gender: female  MRN: [id number]        Interlaminar ELLIE Procedure Note      Procedure: L4-L5 interlaminar epidural steroid injection    Precautions: Marnie Chaudhry reports prior sensitivity to steroid, local anesthetic, iodine, or shellfish. She has vomited when eating shellfish before; no omnipaque was used today.       Consent:  Consent was obtained prior to the procedure. The procedure was discussed at length with Marnie Chaudhry. She was given the opportunity to ask questions regarding the procedure and its associated risks.  In addition to the potential risks associated with the procedure itself, the patient was informed both verbally and in writing of potential side effects of the use glucocorticoids.  The patient appeared to comprehend the informed consent and desired to have the procedure performed, and informed consent was signed.     She was placed in a prone position on the fluoroscopy table and the skin was prepped and draped in a routine sterile fashion. The areas to be injected were each anesthetized with 1 ml of 1% Lidocaine. A 22 gauge 3.5 inch spinal needle was carefully advanced under fluoroscopic guidance to the L4-L5 interlaminar space. 0.5 ml of 70% of Omnipaque was injected to confirm proper needle placement and absence of subdural or vascular flow Once proper placement was confirmed, 2ml of sterile water and 12 mg of betamethasone were injected through the spinal needle.       Fluoroscopic guidance was used intermittently over a 10-minute period to insure proper needle placement and her safety. A hard copy of the fluoroscopic image has been placed in her chart and is saved on the C-arm hard drive. She was monitored after the procedure and discharged home in a stable fashion with a routine follow up.    Procedural Diagnosis:     ICD-10-CM    1. Degeneration of intervertebral disc of lumbar region with discogenic back pain and  Ketoconazole Pregnancy And Lactation Text: This medication is Pregnancy Category C and it isn't know if it is safe during pregnancy. It is also excreted in breast milk and breast feeding isn't recommended.

## 2025-07-23 DIAGNOSIS — G89.29 CHRONIC BILATERAL LOW BACK PAIN WITHOUT SCIATICA: ICD-10-CM

## 2025-07-23 DIAGNOSIS — M54.50 CHRONIC BILATERAL LOW BACK PAIN WITHOUT SCIATICA: ICD-10-CM

## 2025-07-24 ENCOUNTER — OFFICE VISIT (OUTPATIENT)
Dept: FAMILY MEDICINE CLINIC | Facility: CLINIC | Age: 40
End: 2025-07-24
Payer: COMMERCIAL

## 2025-07-24 VITALS
WEIGHT: 256.4 LBS | DIASTOLIC BLOOD PRESSURE: 82 MMHG | SYSTOLIC BLOOD PRESSURE: 138 MMHG | HEART RATE: 98 BPM | HEIGHT: 68 IN | OXYGEN SATURATION: 99 % | BODY MASS INDEX: 38.86 KG/M2

## 2025-07-24 DIAGNOSIS — H69.93 DYSFUNCTION OF EUSTACHIAN TUBE, BILATERAL: Primary | ICD-10-CM

## 2025-07-24 DIAGNOSIS — B37.9 CANDIDIASIS: ICD-10-CM

## 2025-07-24 DIAGNOSIS — R07.89 CHEST TIGHTNESS: ICD-10-CM

## 2025-07-24 PROCEDURE — 99214 OFFICE O/P EST MOD 30 MIN: CPT | Performed by: NURSE PRACTITIONER

## 2025-07-24 RX ORDER — TRIAMCINOLONE ACETONIDE 40 MG/ML
40 INJECTION, SUSPENSION INTRA-ARTICULAR; INTRAMUSCULAR ONCE
Status: DISCONTINUED | OUTPATIENT
Start: 2025-07-24 | End: 2025-07-24

## 2025-07-24 RX ORDER — PHENTERMINE HYDROCHLORIDE 37.5 MG/1
37.5 TABLET ORAL
Qty: 30 TABLET | Refills: 0 | Status: SHIPPED | OUTPATIENT
Start: 2025-07-24 | End: 2025-08-23

## 2025-07-24 RX ORDER — NYSTATIN 100000 U/G
CREAM TOPICAL
Qty: 60 G | Refills: 2 | Status: SHIPPED | OUTPATIENT
Start: 2025-07-24

## 2025-07-24 RX ORDER — CELECOXIB 200 MG/1
200 CAPSULE ORAL 2 TIMES DAILY PRN
Qty: 60 CAPSULE | Refills: 5 | Status: SHIPPED | OUTPATIENT
Start: 2025-07-24

## 2025-07-24 RX ORDER — ALBUTEROL SULFATE 90 UG/1
2 INHALANT RESPIRATORY (INHALATION) EVERY 6 HOURS PRN
Qty: 18 G | Refills: 3 | Status: SHIPPED | OUTPATIENT
Start: 2025-07-24

## 2025-07-24 RX ORDER — FLUCONAZOLE 150 MG/1
150 TABLET ORAL ONCE
Qty: 1 TABLET | Refills: 0 | Status: SHIPPED | OUTPATIENT
Start: 2025-07-24 | End: 2025-07-24

## 2025-07-24 RX ORDER — PREDNISONE 20 MG/1
TABLET ORAL
Qty: 11 TABLET | Refills: 0 | Status: SHIPPED | OUTPATIENT
Start: 2025-07-24

## 2025-07-24 ASSESSMENT — ENCOUNTER SYMPTOMS
CHEST TIGHTNESS: 1
APNEA: 0
COUGH: 0
ALLERGIC/IMMUNOLOGIC NEGATIVE: 1
STRIDOR: 0
CONSTIPATION: 0
VOICE CHANGE: 0
GASTROINTESTINAL NEGATIVE: 1
VOMITING: 0
PHOTOPHOBIA: 0
ANAL BLEEDING: 0
DIARRHEA: 0
EYE REDNESS: 0
TROUBLE SWALLOWING: 0
FACIAL SWELLING: 0
RHINORRHEA: 0
EYE ITCHING: 0
NAUSEA: 0
RECTAL PAIN: 0
SINUS PAIN: 1
SINUS PRESSURE: 1
COLOR CHANGE: 0
ABDOMINAL PAIN: 0
SORE THROAT: 0
BACK PAIN: 0
EYES NEGATIVE: 1
WHEEZING: 0
ABDOMINAL DISTENTION: 0
EYE PAIN: 0
SHORTNESS OF BREATH: 0
CHOKING: 0
EYE DISCHARGE: 0

## 2025-07-24 NOTE — PROGRESS NOTES
PROGRESS NOTE    Chief Complaint   Patient presents with    Follow-up     Patient states right ear is clogged. Sinuses are horrible having trouble relieving sinuses x 1 week.        SUBJECTIVE:         History of Present Illness  The patient presents for ear pain, chest tightness, heat rash, and weight gain.    She has been experiencing ear discomfort for approximately 2 weeks, which led her to seek care at an urgent care facility. Augmentin was prescribed there, but after 2.5 days of treatment, she reports no improvement. Today, she began experiencing pain in her right  ear. Additionally, she mentions a sensation of tightness in her chest, which she initially attributed to anxiety. Despite increasing her Seroquel dosage, the chest tightness persists. She does not have an inhaler and reports no coughing or diarrhea. She is not experiencing any chest pain but describes a constant feeling of tightness. This symptom has been present for at least 2 months. She received a steroid injection in her back in June 2025 and an epidural injection with betametasone 2 weeks ago.    She has noticed increased sweating under her breasts, resulting in raw skin. Despite maintaining her usual diet and exercise routine, she has gained significant weight since discontinuing her medication. She reports no leg swelling. She does not consume probiotics or yogurt.     She is considering resuming phentermine (Adipex) to manage her weight.    She is on Celebrex and has requested a refill.          Past Medical History, Past Surgical History, Family history, Social History, and Medications were all reviewed with the patient today and updated as necessary.       Current Outpatient Medications   Medication Sig Dispense Refill    celecoxib (CELEBREX) 200 MG capsule TAKE 1 CAPSULE BY MOUTH 2 TIMES DAILY AS NEEDED FOR PAIN. 60 capsule 5    albuterol sulfate HFA (PROVENTIL;VENTOLIN;PROAIR) 108 (90 Base) MCG/ACT inhaler Inhale 2 puffs into the

## 2025-07-28 ENCOUNTER — OFFICE VISIT (OUTPATIENT)
Age: 40
End: 2025-07-28
Payer: COMMERCIAL

## 2025-07-28 DIAGNOSIS — M51.362 DEGENERATION OF INTERVERTEBRAL DISC OF LUMBAR REGION WITH DISCOGENIC BACK PAIN AND LOWER EXTREMITY PAIN: ICD-10-CM

## 2025-07-28 DIAGNOSIS — M47.816 LUMBAR FACET ARTHROPATHY: ICD-10-CM

## 2025-07-28 DIAGNOSIS — M48.061 STENOSIS OF LATERAL RECESS OF LUMBAR SPINE: Primary | ICD-10-CM

## 2025-07-28 PROCEDURE — 99213 OFFICE O/P EST LOW 20 MIN: CPT | Performed by: NURSE PRACTITIONER

## 2025-07-28 NOTE — PROGRESS NOTES
No significant canal or foraminal  stenosis.    Impression  1.  Lumbar spine degenerative changes are worst at L4-L5, where there is a  broad-based central to left subarticular protrusion. The protrusion moderately  narrows the spinal canal, narrows the left greater than right subarticular  recesses, and likely contacts the traversing left greater than right L5 nerve  roots..    2.  Lumbar spine degenerative features are otherwise as fully detailed above.      Electronically signed by XI DELVALLE        Assessment/Plan:        ICD-10-CM    1. Stenosis of lateral recess of lumbar spine  M48.061 [facility]      2. Lumbar facet arthropathy  M47.816 [facility]      3. Degeneration of intervertebral disc of lumbar region with discogenic back pain and lower extremity pain  M51.362 [facility]           MRI reviewed with Dr. Eusebio Aranda.  In the lieu of the fact the patient does not have any leg pain and resolved back pain the disc retrusion is continuous into the actual disc.  Therefore any kind of discectomy surgery would most [place] have to take the entire L4-5 disc.  Even then we cannot give a guarantee that she would be better.  Therefore recommendation would be a formal referral to pain management for further aggressive interventional management.    - Referral to pain management. The patient's care would be best managed in a formal pain management setting and will be referred accordingly.    No orders of the defined types were placed in this encounter.       Orders Placed This Encounter   Procedures    [facility]        3 This is stable chronic illness/condition      Return for Refer to Dr. [place], refer to pain management.     TIARA Paiz - CNP  07/28/25      Elements of this note were created using speech recognition software.  As such, errors of

## 2025-08-04 ENCOUNTER — CLINICAL SUPPORT (OUTPATIENT)
Dept: FAMILY MEDICINE CLINIC | Facility: CLINIC | Age: 40
End: 2025-08-04
Payer: COMMERCIAL

## 2025-08-04 DIAGNOSIS — G89.29 CHRONIC BILATERAL LOW BACK PAIN WITHOUT SCIATICA: Primary | ICD-10-CM

## 2025-08-04 DIAGNOSIS — M54.50 CHRONIC BILATERAL LOW BACK PAIN WITHOUT SCIATICA: Primary | ICD-10-CM

## 2025-08-04 PROCEDURE — 96372 THER/PROPH/DIAG INJ SC/IM: CPT | Performed by: NURSE PRACTITIONER

## 2025-08-04 RX ORDER — KETOROLAC TROMETHAMINE 30 MG/ML
60 INJECTION, SOLUTION INTRAMUSCULAR; INTRAVENOUS ONCE
Status: COMPLETED | OUTPATIENT
Start: 2025-08-04 | End: 2025-08-04

## 2025-08-04 RX ADMIN — KETOROLAC TROMETHAMINE 60 MG: 30 INJECTION, SOLUTION INTRAMUSCULAR; INTRAVENOUS at 09:11

## 2025-08-19 DIAGNOSIS — K21.9 GASTROESOPHAGEAL REFLUX DISEASE WITHOUT ESOPHAGITIS: ICD-10-CM

## 2025-08-19 RX ORDER — PANTOPRAZOLE SODIUM 40 MG/1
40 TABLET, DELAYED RELEASE ORAL
Qty: 90 TABLET | Refills: 2 | Status: SHIPPED | OUTPATIENT
Start: 2025-08-19

## (undated) DEVICE — BLADE 1882040 5PK INFERIOR TURB 2MM

## (undated) DEVICE — CODMAN® SURGICAL PATTIES 1" X 3" (2.54CM X 7.62CM): Brand: CODMAN®

## (undated) DEVICE — ELECTRODE PT RET AD L9FT HI MOIST COND ADH HYDRGEL CORDED

## (undated) DEVICE — SOLUTION IRRIG 1000ML 0.9% SOD CHL USP POUR PLAS BTL

## (undated) DEVICE — KIT,ANTI FOG,W/SPONGE & FLUID,SOFT PACK: Brand: MEDLINE

## (undated) DEVICE — SOLUTION IV 1000ML LAC RINGERS PH 6.5 INJ USP VIAFLX PLAS

## (undated) DEVICE — SPLINT NSL SEPTAL SUPP REG PRE PUNCHED HOLE SIL STRL BRTH EZ

## (undated) DEVICE — TUBING, SUCTION, 1/4" X 10', STRAIGHT: Brand: MEDLINE

## (undated) DEVICE — SUTURE NONABSORBABLE MONOFILAMENT 3-0 PS-1 18 IN BLK ETHILON 1663H

## (undated) DEVICE — GLOVE ORANGE PI 8 1/2   MSG9085

## (undated) DEVICE — KIT PROCEDURE SURG HEAD AND NECK TOTE

## (undated) DEVICE — CANISTER, RIGID, 2000CC: Brand: MEDLINE INDUSTRIES, INC.

## (undated) DEVICE — SYRINGE MED 50ML LUERLOCK TIP

## (undated) DEVICE — BLADE ES ELASTOMERIC COAT INSUL DURABLE BEND UPTO 90DEG

## (undated) DEVICE — BLADE 1884004HR TRICUT 5PK M4 4MM ROTATE: Brand: TRICUT

## (undated) DEVICE — PENCIL ES L3M BTTN SWCH HOLSTER W/ BLDE ELECTRD EDGE

## (undated) DEVICE — KIT PROCEDURE SURG T AND A ORAL TOTE

## (undated) DEVICE — SUTURE CHROMIC GUT SZ 4-0 L27IN ABSRB BRN L17MM RB-1 1/2 U203H

## (undated) DEVICE — DEVICE INFL PRSS G INDIC DISP (MUST BE PURC IN MULTIPLES OF 5)

## (undated) DEVICE — BALLOON SINUPLASTY 6X16 MM SYS RELIEVA SPINPLUS